# Patient Record
Sex: FEMALE | Race: WHITE | NOT HISPANIC OR LATINO
[De-identification: names, ages, dates, MRNs, and addresses within clinical notes are randomized per-mention and may not be internally consistent; named-entity substitution may affect disease eponyms.]

---

## 2018-07-12 ENCOUNTER — APPOINTMENT (OUTPATIENT)
Dept: NEUROSURGERY | Facility: CLINIC | Age: 23
End: 2018-07-12
Payer: MEDICAID

## 2018-07-12 VITALS
HEART RATE: 66 BPM | DIASTOLIC BLOOD PRESSURE: 77 MMHG | OXYGEN SATURATION: 99 % | WEIGHT: 115 LBS | SYSTOLIC BLOOD PRESSURE: 113 MMHG | TEMPERATURE: 98.6 F | RESPIRATION RATE: 18 BRPM | BODY MASS INDEX: 18.05 KG/M2 | HEIGHT: 67 IN

## 2018-07-12 DIAGNOSIS — Z78.9 OTHER SPECIFIED HEALTH STATUS: ICD-10-CM

## 2018-07-12 DIAGNOSIS — Z86.2 PERSONAL HISTORY OF DISEASES OF THE BLOOD AND BLOOD-FORMING ORGANS AND CERTAIN DISORDERS INVOLVING THE IMMUNE MECHANISM: ICD-10-CM

## 2018-07-12 DIAGNOSIS — L70.9 ACNE, UNSPECIFIED: ICD-10-CM

## 2018-07-12 PROCEDURE — 99204 OFFICE O/P NEW MOD 45 MIN: CPT

## 2018-08-08 ENCOUNTER — APPOINTMENT (OUTPATIENT)
Dept: NEUROSURGERY | Facility: HOSPITAL | Age: 23
End: 2018-08-08

## 2018-10-22 ENCOUNTER — APPOINTMENT (OUTPATIENT)
Dept: NEUROSURGERY | Facility: CLINIC | Age: 23
End: 2018-10-22
Payer: MEDICAID

## 2018-10-22 PROCEDURE — 99213 OFFICE O/P EST LOW 20 MIN: CPT

## 2018-10-26 VITALS
TEMPERATURE: 98 F | RESPIRATION RATE: 18 BRPM | OXYGEN SATURATION: 100 % | WEIGHT: 110.89 LBS | HEART RATE: 85 BPM | DIASTOLIC BLOOD PRESSURE: 75 MMHG | HEIGHT: 67 IN | SYSTOLIC BLOOD PRESSURE: 119 MMHG

## 2018-10-26 NOTE — PRE-OP CHECKLIST - SELECT TESTS ORDERED
CBC/CMP/Urinalysis/Results in MD note/INR/EKG/PT/PTT/CXR HCG/CXR/Urine HCG Negative/CMP/Results in MD note/Urinalysis/CBC/INR/PT/PTT/EKG

## 2018-10-28 NOTE — H&P ADULT - NSHPREVIEWOFSYSTEMS_GEN_ALL_CORE
No rashes  No headaches  No change in vision  No change in hearing  No nose bleeds  No hives  No bleeding gums  Neck is not swollen  No shortness of breath  No change in appetite  No difficulty with urination  No pain in extremities  No headache  No weakness  Affect appropriate

## 2018-10-28 NOTE — H&P ADULT - HISTORY OF PRESENT ILLNESS
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 This was initially discovered in 2014 and remained asymptomatic. The lesion grew in size in 2016 and continued to grow since last MRI 4/18. She continues to deny headaches, vision changes or gait changes. She is planned for left craniotomy for resection of this suspected low grade lesion on 10/29/18 (Monday)

## 2018-10-28 NOTE — H&P ADULT - ASSESSMENT
23 year old female with left parietal lesion    -Planned left craniotomy for resection of brain tumor  -Q1 neurochecks postop in ICU  -Decadron   -keppra  -Pathology will be sent in OR for analysis

## 2018-10-29 ENCOUNTER — RESULT REVIEW (OUTPATIENT)
Age: 23
End: 2018-10-29

## 2018-10-29 ENCOUNTER — APPOINTMENT (OUTPATIENT)
Dept: NEUROSURGERY | Facility: HOSPITAL | Age: 23
End: 2018-10-29

## 2018-10-29 ENCOUNTER — INPATIENT (INPATIENT)
Facility: HOSPITAL | Age: 23
LOS: 2 days | Discharge: HOME | DRG: 25 | End: 2018-11-01
Attending: NEUROLOGICAL SURGERY | Admitting: NEUROLOGICAL SURGERY
Payer: MEDICAID

## 2018-10-29 LAB
ANION GAP SERPL CALC-SCNC: 16 MMOL/L — SIGNIFICANT CHANGE UP (ref 5–17)
BASE EXCESS BLDA CALC-SCNC: -4.1 MMOL/L — LOW (ref -2–3)
BASE EXCESS BLDA CALC-SCNC: -4.6 MMOL/L — LOW (ref -2–3)
BUN SERPL-MCNC: 11 MG/DL — SIGNIFICANT CHANGE UP (ref 7–23)
CA-I BLDA-SCNC: 1.13 MMOL/L — SIGNIFICANT CHANGE UP (ref 1.12–1.3)
CA-I BLDA-SCNC: 1.14 MMOL/L — SIGNIFICANT CHANGE UP (ref 1.12–1.3)
CALCIUM SERPL-MCNC: 9.3 MG/DL — SIGNIFICANT CHANGE UP (ref 8.4–10.5)
CHLORIDE SERPL-SCNC: 102 MMOL/L — SIGNIFICANT CHANGE UP (ref 96–108)
CO2 SERPL-SCNC: 19 MMOL/L — LOW (ref 22–31)
COHGB MFR BLDA: 0.2 % — SIGNIFICANT CHANGE UP
COHGB MFR BLDA: 0.3 % — SIGNIFICANT CHANGE UP
CREAT SERPL-MCNC: 0.62 MG/DL — SIGNIFICANT CHANGE UP (ref 0.5–1.3)
GAS PNL BLDA: SIGNIFICANT CHANGE UP
GAS PNL BLDA: SIGNIFICANT CHANGE UP
GLUCOSE BLDC GLUCOMTR-MCNC: 118 MG/DL — HIGH (ref 70–99)
GLUCOSE BLDC GLUCOMTR-MCNC: 91 MG/DL — SIGNIFICANT CHANGE UP (ref 70–99)
GLUCOSE BLDC GLUCOMTR-MCNC: 96 MG/DL — SIGNIFICANT CHANGE UP (ref 70–99)
GLUCOSE SERPL-MCNC: 132 MG/DL — HIGH (ref 70–99)
HCO3 BLDA-SCNC: 19 MMOL/L — LOW (ref 21–28)
HCO3 BLDA-SCNC: 20 MMOL/L — LOW (ref 21–28)
HCT VFR BLD CALC: 29.1 % — LOW (ref 34.5–45)
HGB BLD-MCNC: 9.6 G/DL — LOW (ref 11.5–15.5)
HGB BLDA-MCNC: 10.3 G/DL — LOW (ref 11.5–15.5)
HGB BLDA-MCNC: 10.8 G/DL — LOW (ref 11.5–15.5)
MAGNESIUM SERPL-MCNC: 1.8 MG/DL — SIGNIFICANT CHANGE UP (ref 1.6–2.6)
MCHC RBC-ENTMCNC: 29.4 PG — SIGNIFICANT CHANGE UP (ref 27–34)
MCHC RBC-ENTMCNC: 33 G/DL — SIGNIFICANT CHANGE UP (ref 32–36)
MCV RBC AUTO: 89.3 FL — SIGNIFICANT CHANGE UP (ref 80–100)
METHGB MFR BLDA: 0.6 % — SIGNIFICANT CHANGE UP
METHGB MFR BLDA: 0.8 % — SIGNIFICANT CHANGE UP
O2 CT VFR BLDA CALC: 15 ML/DL — SIGNIFICANT CHANGE UP (ref 15–23)
O2 CT VFR BLDA CALC: 15.7 ML/DL — SIGNIFICANT CHANGE UP (ref 15–23)
OXYHGB MFR BLDA: 98 % — SIGNIFICANT CHANGE UP (ref 94–100)
OXYHGB MFR BLDA: 99 % — SIGNIFICANT CHANGE UP (ref 94–100)
PCO2 BLDA: 30 MMHG — LOW (ref 32–45)
PCO2 BLDA: 34 MMHG — SIGNIFICANT CHANGE UP (ref 32–45)
PH BLDA: 7.39 — SIGNIFICANT CHANGE UP (ref 7.35–7.45)
PH BLDA: 7.42 — SIGNIFICANT CHANGE UP (ref 7.35–7.45)
PHOSPHATE SERPL-MCNC: 3.9 MG/DL — SIGNIFICANT CHANGE UP (ref 2.5–4.5)
PLATELET # BLD AUTO: 226 K/UL — SIGNIFICANT CHANGE UP (ref 150–400)
PO2 BLDA: 287 MMHG — HIGH (ref 83–108)
PO2 BLDA: 307 MMHG — HIGH (ref 83–108)
POTASSIUM BLDA-SCNC: 3.4 MMOL/L — LOW (ref 3.5–4.9)
POTASSIUM BLDA-SCNC: 4.1 MMOL/L — SIGNIFICANT CHANGE UP (ref 3.5–4.9)
POTASSIUM SERPL-MCNC: 3.7 MMOL/L — SIGNIFICANT CHANGE UP (ref 3.5–5.3)
POTASSIUM SERPL-SCNC: 3.7 MMOL/L — SIGNIFICANT CHANGE UP (ref 3.5–5.3)
RBC # BLD: 3.26 M/UL — LOW (ref 3.8–5.2)
RBC # FLD: 14.7 % — SIGNIFICANT CHANGE UP (ref 10.3–16.9)
SAO2 % BLDA: 99 % — SIGNIFICANT CHANGE UP (ref 95–100)
SAO2 % BLDA: 99 % — SIGNIFICANT CHANGE UP (ref 95–100)
SODIUM BLDA-SCNC: 133 MMOL/L — LOW (ref 138–146)
SODIUM BLDA-SCNC: 135 MMOL/L — LOW (ref 138–146)
SODIUM SERPL-SCNC: 137 MMOL/L — SIGNIFICANT CHANGE UP (ref 135–145)
WBC # BLD: 7.7 K/UL — SIGNIFICANT CHANGE UP (ref 3.8–10.5)
WBC # FLD AUTO: 7.7 K/UL — SIGNIFICANT CHANGE UP (ref 3.8–10.5)

## 2018-10-29 PROCEDURE — 69990 MICROSURGERY ADD-ON: CPT | Mod: 80,59

## 2018-10-29 PROCEDURE — 61781 SCAN PROC CRANIAL INTRA: CPT

## 2018-10-29 PROCEDURE — 61510 CRNEC TREPH EXC BRN TUM STTL: CPT | Mod: 80

## 2018-10-29 PROCEDURE — 61781 SCAN PROC CRANIAL INTRA: CPT | Mod: 80

## 2018-10-29 PROCEDURE — 69990 MICROSURGERY ADD-ON: CPT | Mod: 59

## 2018-10-29 PROCEDURE — 61510 CRNEC TREPH EXC BRN TUM STTL: CPT

## 2018-10-29 PROCEDURE — 70551 MRI BRAIN STEM W/O DYE: CPT | Mod: 26

## 2018-10-29 RX ORDER — VANCOMYCIN HCL 1 G
750 VIAL (EA) INTRAVENOUS EVERY 8 HOURS
Qty: 0 | Refills: 0 | Status: COMPLETED | OUTPATIENT
Start: 2018-10-29 | End: 2018-10-30

## 2018-10-29 RX ORDER — MAGNESIUM SULFATE 500 MG/ML
2 VIAL (ML) INJECTION ONCE
Qty: 0 | Refills: 0 | Status: COMPLETED | OUTPATIENT
Start: 2018-10-29 | End: 2018-10-29

## 2018-10-29 RX ORDER — TRAMADOL HYDROCHLORIDE 50 MG/1
25 TABLET ORAL EVERY 6 HOURS
Qty: 0 | Refills: 0 | Status: DISCONTINUED | OUTPATIENT
Start: 2018-10-29 | End: 2018-10-31

## 2018-10-29 RX ORDER — DEXTROSE 50 % IN WATER 50 %
25 SYRINGE (ML) INTRAVENOUS ONCE
Qty: 0 | Refills: 0 | Status: DISCONTINUED | OUTPATIENT
Start: 2018-10-29 | End: 2018-11-01

## 2018-10-29 RX ORDER — DEXAMETHASONE 0.5 MG/5ML
4 ELIXIR ORAL EVERY 6 HOURS
Qty: 0 | Refills: 0 | Status: DISCONTINUED | OUTPATIENT
Start: 2018-10-29 | End: 2018-10-30

## 2018-10-29 RX ORDER — INFLUENZA VIRUS VACCINE 15; 15; 15; 15 UG/.5ML; UG/.5ML; UG/.5ML; UG/.5ML
0.5 SUSPENSION INTRAMUSCULAR ONCE
Qty: 0 | Refills: 0 | Status: DISCONTINUED | OUTPATIENT
Start: 2018-10-29 | End: 2018-11-01

## 2018-10-29 RX ORDER — DEXTROSE 50 % IN WATER 50 %
15 SYRINGE (ML) INTRAVENOUS ONCE
Qty: 0 | Refills: 0 | Status: DISCONTINUED | OUTPATIENT
Start: 2018-10-29 | End: 2018-11-01

## 2018-10-29 RX ORDER — VANCOMYCIN HCL 1 G
750 VIAL (EA) INTRAVENOUS EVERY 8 HOURS
Qty: 0 | Refills: 0 | Status: DISCONTINUED | OUTPATIENT
Start: 2018-10-29 | End: 2018-10-29

## 2018-10-29 RX ORDER — ACETAMINOPHEN 500 MG
1000 TABLET ORAL ONCE
Qty: 0 | Refills: 0 | Status: COMPLETED | OUTPATIENT
Start: 2018-10-29 | End: 2018-10-29

## 2018-10-29 RX ORDER — PANTOPRAZOLE SODIUM 20 MG/1
40 TABLET, DELAYED RELEASE ORAL
Qty: 0 | Refills: 0 | Status: DISCONTINUED | OUTPATIENT
Start: 2018-10-29 | End: 2018-11-01

## 2018-10-29 RX ORDER — LEVETIRACETAM 250 MG/1
500 TABLET, FILM COATED ORAL EVERY 12 HOURS
Qty: 0 | Refills: 0 | Status: DISCONTINUED | OUTPATIENT
Start: 2018-10-29 | End: 2018-11-01

## 2018-10-29 RX ORDER — SODIUM CHLORIDE 9 MG/ML
1000 INJECTION, SOLUTION INTRAVENOUS
Qty: 0 | Refills: 0 | Status: DISCONTINUED | OUTPATIENT
Start: 2018-10-29 | End: 2018-11-01

## 2018-10-29 RX ORDER — INSULIN LISPRO 100/ML
VIAL (ML) SUBCUTANEOUS
Qty: 0 | Refills: 0 | Status: DISCONTINUED | OUTPATIENT
Start: 2018-10-29 | End: 2018-11-01

## 2018-10-29 RX ORDER — SODIUM CHLORIDE 9 MG/ML
1000 INJECTION INTRAMUSCULAR; INTRAVENOUS; SUBCUTANEOUS
Qty: 0 | Refills: 0 | Status: DISCONTINUED | OUTPATIENT
Start: 2018-10-29 | End: 2018-10-30

## 2018-10-29 RX ORDER — DEXTROSE 50 % IN WATER 50 %
12.5 SYRINGE (ML) INTRAVENOUS ONCE
Qty: 0 | Refills: 0 | Status: DISCONTINUED | OUTPATIENT
Start: 2018-10-29 | End: 2018-11-01

## 2018-10-29 RX ORDER — POTASSIUM CHLORIDE 20 MEQ
10 PACKET (EA) ORAL
Qty: 0 | Refills: 0 | Status: COMPLETED | OUTPATIENT
Start: 2018-10-29 | End: 2018-10-29

## 2018-10-29 RX ORDER — GLUCAGON INJECTION, SOLUTION 0.5 MG/.1ML
1 INJECTION, SOLUTION SUBCUTANEOUS ONCE
Qty: 0 | Refills: 0 | Status: DISCONTINUED | OUTPATIENT
Start: 2018-10-29 | End: 2018-11-01

## 2018-10-29 RX ORDER — ACETAMINOPHEN 500 MG
650 TABLET ORAL EVERY 6 HOURS
Qty: 0 | Refills: 0 | Status: DISCONTINUED | OUTPATIENT
Start: 2018-10-29 | End: 2018-11-01

## 2018-10-29 RX ADMIN — Medication 100 MILLIEQUIVALENT(S): at 21:28

## 2018-10-29 RX ADMIN — Medication 100 MILLIEQUIVALENT(S): at 22:46

## 2018-10-29 RX ADMIN — Medication 4 MILLIGRAM(S): at 21:27

## 2018-10-29 RX ADMIN — Medication 1000 MILLIGRAM(S): at 20:11

## 2018-10-29 RX ADMIN — Medication 250 MILLIGRAM(S): at 22:51

## 2018-10-29 RX ADMIN — Medication 50 GRAM(S): at 21:33

## 2018-10-29 RX ADMIN — Medication 400 MILLIGRAM(S): at 19:32

## 2018-10-29 RX ADMIN — SODIUM CHLORIDE 75 MILLILITER(S): 9 INJECTION INTRAMUSCULAR; INTRAVENOUS; SUBCUTANEOUS at 19:32

## 2018-10-29 NOTE — PROGRESS NOTE ADULT - ASSESSMENT
23y/F with L parietal mass, brain compression, cerebral edema, s/p L stereotactic crani, resection of brain tumor (10/29/2018, Dr. Gardiner, Dr. Bowden) Frozen: low grade glioma)    PLAN:   NEURO: neurochecks q1h, PRN pain meds with Tylenol, tramadol  s/p resection of brain mass:  steroid taper as per neurosurgery; f/u final histopathology, MRI on stepdown  seizure prophylaxis: levetiracetam 500mg PO BID  REHAB:  physical therapy evaluation and management    EARLY MOB:  HOB up    PULM:  PRN O2 support to keep sats >/=92%, incentive spirometry  CARDIO:  SBP goal 100-150mm Hg  ENDO:  Blood sugar goals 140-180 mg/dL, continue insulin sliding scale  GI:  PPI for GI prophylaxis while on steroids  DIET: regular diet  RENAL:  IVFL once eating well  HEM/ONC: normocytic anemia, will trend  VTE Prophylaxis: SCDs only, no DVT chemoprophylaxis for now as patient is high risk for bleed (fresh post-op), baseline LE Doppler for DVT suspected on admission (brain mass)  ID: afebrile, no leukocytosis, periop vancomycin then d/c  Social: will update family    ATTENDING ATTESTATION:  I was physically present for the key portions of the evaluation and management (E/M) service provided.  I agree with the above history, physical and plan, which I have reviewed and edited where appropriate.    Patient at high risk for neurological deterioration or death due to:  ICU delirium, aspiration PNA, DVT / PE, seizures.  Critical care time, excluding procedures: 60 minutes spent on total encounter, more than 50% of the visit was spent counseling and/or coordinating care by the attending physician.     Plan discussed with RN, house staff.

## 2018-10-29 NOTE — PROGRESS NOTE ADULT - SUBJECTIVE AND OBJECTIVE BOX
=================================  NEUROCRITICAL CARE ATTENDING NOTE  =================================    WARD REYES   MRN-[id number]  Summary:  23y/F with no PMH, 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  Admitted 10/28 for elective resection of mass.  Overnight Events: No significant events overnight.    Past Medical History: Brain lesion  No pertinent past medical history  Allergies:  adhesives (Rash) penicillin (Rash) sulfa drugs (Rash)   Home meds:  Accutane 10 mg oral capsule: 1 cap(s) orally 2 times a day    PHYSICAL EXAMINATION  HR: 84 (10-29 @ 19:53) (70 - 100) BP: 112/67 (10-29 @ 19:53) (111/58 - 119/62) RR: 18 (10-29 @ 19:53) (15 - 18) SpO2: 100% (10-29 @ 19:53) (100% - 100%)  NEUROLOGIC EXAMINATION:  Patient is awake, alert, fully oriented, pupils 3mm equal and reactive to light, EOMs intact, no facial droop, moving all 4s  GENERAL:  not intubated, not in cardiorespiratory distress  EENT: anicteric  CARDIOVASC:  (+) S1 S2, normal rate and regular rhythm  PULMONARY:  clear to auscultation bilaterally  ABDOMEN:  soft, nontender, with normoactive bowel sounds  EXTREMITIES:  no edema  SKIN:  no rash     LABS:  CAPILLARY BLOOD GLUCOSE 96 91               9.6    7.7   )-----------( 226      ( 29 Oct 2018 19:49 )             29.1     137  |  102  |  11  ----------------------------<  132<H>  3.7   |  19<L>  |  0.62    Ca    9.3      29 Oct 2018 19:49  Phos  3.9     10-29  Mg     1.8     10-29    10-29 @ 07:01  -  10-29 @ 20:23  IN: 75 mL / OUT: 75 mL / NET: 0 mL    Bacteriology:  CSF studies:  EEG:  Neuroimaging:  10/29 MRI: L parietal mass, likely low grade neoplasm  Other imaging:    MEDICATIONS: dexamethasone 4mg PO q6h mod ISS levetiracetam 500 q12h PO pantoprazole 40 IV daily vancomycin 750 IV q8h  tramadol PRN    IV FLUIDS: NS@75cc/hr  DRIPS:  DIET: regular  Lines: Nery  Drains:   Morris  Wounds:    CODE STATUS:  Full Code                       GOALS OF CARE:  aggressive                      DISPOSITION:  ICU

## 2018-10-29 NOTE — PROGRESS NOTE ADULT - ASSESSMENT
POD 0 s/p left stereotactic craniotomy and resection of tumor neuro intact  q1h neuro checks  pain control  cont decadron  cont keppra ppx  enc IS use  ADAT  bowel regimen  PPI  guerrero to gravity, dc in am  replete hypokalemia and hypomagnesemia  NS hydration for now  SCDs  post op david  d/w Dr. Gardiner and ICU house staff

## 2018-10-29 NOTE — PROGRESS NOTE ADULT - SUBJECTIVE AND OBJECTIVE BOX
Post op Note    Dx: left brain tumor    23y    Female   s/p left craniotomy and gross total resection of brain tumor today. Patient tolerated the procedure without complication and was extubated post procedure and transferred to ICU for observation. Patient currently denies any pain, headaches, nausea of vomiting.           T(C): --  HR: 84 (10-29-18 @ 19:53) (70 - 100)  BP: 112/67 (10-29-18 @ 19:53) (111/58 - 119/62)  RR: 18 (10-29-18 @ 19:53) (15 - 18)  SpO2: 100% (10-29-18 @ 19:53) (100% - 100%)  Wt(kg): --      Physical exam  Awake, alert, oriented x 3, PERRL, EOMI  Follows commands, speech clear  WENRER X4 with good strength   Incision: C/D/I

## 2018-10-30 LAB
ANION GAP SERPL CALC-SCNC: 15 MMOL/L — SIGNIFICANT CHANGE UP (ref 5–17)
BUN SERPL-MCNC: 8 MG/DL — SIGNIFICANT CHANGE UP (ref 7–23)
CALCIUM SERPL-MCNC: 9.4 MG/DL — SIGNIFICANT CHANGE UP (ref 8.4–10.5)
CHLORIDE SERPL-SCNC: 102 MMOL/L — SIGNIFICANT CHANGE UP (ref 96–108)
CO2 SERPL-SCNC: 21 MMOL/L — LOW (ref 22–31)
CREAT SERPL-MCNC: 0.53 MG/DL — SIGNIFICANT CHANGE UP (ref 0.5–1.3)
GLUCOSE BLDC GLUCOMTR-MCNC: 114 MG/DL — HIGH (ref 70–99)
GLUCOSE BLDC GLUCOMTR-MCNC: 124 MG/DL — HIGH (ref 70–99)
GLUCOSE BLDC GLUCOMTR-MCNC: 124 MG/DL — HIGH (ref 70–99)
GLUCOSE BLDC GLUCOMTR-MCNC: 130 MG/DL — HIGH (ref 70–99)
GLUCOSE SERPL-MCNC: 139 MG/DL — HIGH (ref 70–99)
HBA1C BLD-MCNC: 4.8 % — SIGNIFICANT CHANGE UP (ref 4–5.6)
HCT VFR BLD CALC: 27.8 % — LOW (ref 34.5–45)
HGB BLD-MCNC: 9.2 G/DL — LOW (ref 11.5–15.5)
MAGNESIUM SERPL-MCNC: 2.4 MG/DL — SIGNIFICANT CHANGE UP (ref 1.6–2.6)
MCHC RBC-ENTMCNC: 29.6 PG — SIGNIFICANT CHANGE UP (ref 27–34)
MCHC RBC-ENTMCNC: 33.1 G/DL — SIGNIFICANT CHANGE UP (ref 32–36)
MCV RBC AUTO: 89.4 FL — SIGNIFICANT CHANGE UP (ref 80–100)
PHOSPHATE SERPL-MCNC: 4.7 MG/DL — HIGH (ref 2.5–4.5)
PLATELET # BLD AUTO: 229 K/UL — SIGNIFICANT CHANGE UP (ref 150–400)
POTASSIUM SERPL-MCNC: 4.1 MMOL/L — SIGNIFICANT CHANGE UP (ref 3.5–5.3)
POTASSIUM SERPL-SCNC: 4.1 MMOL/L — SIGNIFICANT CHANGE UP (ref 3.5–5.3)
RBC # BLD: 3.11 M/UL — LOW (ref 3.8–5.2)
RBC # FLD: 14.7 % — SIGNIFICANT CHANGE UP (ref 10.3–16.9)
SODIUM SERPL-SCNC: 138 MMOL/L — SIGNIFICANT CHANGE UP (ref 135–145)
WBC # BLD: 13.3 K/UL — HIGH (ref 3.8–10.5)
WBC # FLD AUTO: 13.3 K/UL — HIGH (ref 3.8–10.5)

## 2018-10-30 PROCEDURE — 99291 CRITICAL CARE FIRST HOUR: CPT | Mod: 24

## 2018-10-30 PROCEDURE — 93970 EXTREMITY STUDY: CPT | Mod: 26

## 2018-10-30 RX ORDER — SODIUM CHLORIDE 9 MG/ML
1000 INJECTION INTRAMUSCULAR; INTRAVENOUS; SUBCUTANEOUS
Qty: 0 | Refills: 0 | Status: DISCONTINUED | OUTPATIENT
Start: 2018-10-30 | End: 2018-10-31

## 2018-10-30 RX ORDER — ONDANSETRON 8 MG/1
4 TABLET, FILM COATED ORAL EVERY 6 HOURS
Qty: 0 | Refills: 0 | Status: DISCONTINUED | OUTPATIENT
Start: 2018-10-30 | End: 2018-11-01

## 2018-10-30 RX ORDER — DEXAMETHASONE 0.5 MG/5ML
1 ELIXIR ORAL EVERY 24 HOURS
Qty: 0 | Refills: 0 | Status: CANCELLED | OUTPATIENT
Start: 2018-11-06 | End: 2018-11-01

## 2018-10-30 RX ORDER — DEXAMETHASONE 0.5 MG/5ML
4 ELIXIR ORAL EVERY 12 HOURS
Qty: 0 | Refills: 0 | Status: COMPLETED | OUTPATIENT
Start: 2018-10-30 | End: 2018-11-01

## 2018-10-30 RX ORDER — ACETAMINOPHEN 500 MG
1000 TABLET ORAL ONCE
Qty: 0 | Refills: 0 | Status: COMPLETED | OUTPATIENT
Start: 2018-10-30 | End: 2018-10-30

## 2018-10-30 RX ORDER — METOCLOPRAMIDE HCL 10 MG
10 TABLET ORAL EVERY 8 HOURS
Qty: 0 | Refills: 0 | Status: DISCONTINUED | OUTPATIENT
Start: 2018-10-30 | End: 2018-11-01

## 2018-10-30 RX ORDER — DEXAMETHASONE 0.5 MG/5ML
2 ELIXIR ORAL EVERY 12 HOURS
Qty: 0 | Refills: 0 | Status: DISCONTINUED | OUTPATIENT
Start: 2018-11-01 | End: 2018-11-01

## 2018-10-30 RX ORDER — DEXAMETHASONE 0.5 MG/5ML
2 ELIXIR ORAL EVERY 24 HOURS
Qty: 0 | Refills: 0 | Status: CANCELLED | OUTPATIENT
Start: 2018-11-04 | End: 2018-11-01

## 2018-10-30 RX ORDER — DEXAMETHASONE 0.5 MG/5ML
ELIXIR ORAL
Qty: 0 | Refills: 0 | Status: DISCONTINUED | OUTPATIENT
Start: 2018-10-30 | End: 2018-11-01

## 2018-10-30 RX ADMIN — TRAMADOL HYDROCHLORIDE 25 MILLIGRAM(S): 50 TABLET ORAL at 09:44

## 2018-10-30 RX ADMIN — Medication 400 MILLIGRAM(S): at 18:14

## 2018-10-30 RX ADMIN — TRAMADOL HYDROCHLORIDE 25 MILLIGRAM(S): 50 TABLET ORAL at 06:22

## 2018-10-30 RX ADMIN — TRAMADOL HYDROCHLORIDE 25 MILLIGRAM(S): 50 TABLET ORAL at 15:32

## 2018-10-30 RX ADMIN — TRAMADOL HYDROCHLORIDE 25 MILLIGRAM(S): 50 TABLET ORAL at 15:18

## 2018-10-30 RX ADMIN — Medication 4 MILLIGRAM(S): at 09:59

## 2018-10-30 RX ADMIN — Medication 4 MILLIGRAM(S): at 02:46

## 2018-10-30 RX ADMIN — Medication 650 MILLIGRAM(S): at 23:18

## 2018-10-30 RX ADMIN — TRAMADOL HYDROCHLORIDE 25 MILLIGRAM(S): 50 TABLET ORAL at 08:40

## 2018-10-30 RX ADMIN — TRAMADOL HYDROCHLORIDE 25 MILLIGRAM(S): 50 TABLET ORAL at 03:10

## 2018-10-30 RX ADMIN — ONDANSETRON 4 MILLIGRAM(S): 8 TABLET, FILM COATED ORAL at 13:49

## 2018-10-30 RX ADMIN — Medication 650 MILLIGRAM(S): at 01:55

## 2018-10-30 RX ADMIN — Medication 1000 MILLIGRAM(S): at 19:24

## 2018-10-30 RX ADMIN — Medication 250 MILLIGRAM(S): at 06:24

## 2018-10-30 RX ADMIN — LEVETIRACETAM 500 MILLIGRAM(S): 250 TABLET, FILM COATED ORAL at 13:49

## 2018-10-30 RX ADMIN — Medication 4 MILLIGRAM(S): at 18:14

## 2018-10-30 RX ADMIN — Medication 1000 MILLIGRAM(S): at 10:00

## 2018-10-30 RX ADMIN — PANTOPRAZOLE SODIUM 40 MILLIGRAM(S): 20 TABLET, DELAYED RELEASE ORAL at 06:24

## 2018-10-30 RX ADMIN — Medication 400 MILLIGRAM(S): at 09:59

## 2018-10-30 RX ADMIN — LEVETIRACETAM 500 MILLIGRAM(S): 250 TABLET, FILM COATED ORAL at 02:46

## 2018-10-30 RX ADMIN — Medication 650 MILLIGRAM(S): at 02:43

## 2018-10-30 NOTE — PHYSICAL THERAPY INITIAL EVALUATION ADULT - PHYSICAL ASSIST/NONPHYSICAL ASSIST: SIT/STAND, REHAB EVAL
verbal cues/1 person assist/slightly unsteady however no LOB noted; increased time required to complete task

## 2018-10-30 NOTE — PROGRESS NOTE ADULT - SUBJECTIVE AND OBJECTIVE BOX
S/Overnight events:        Hospital Course:   POD#       Vital Signs Last 24 Hrs  T(C): 37.3 (30 Oct 2018 06:02), Max: 37.3 (30 Oct 2018 06:02)  T(F): 99.1 (30 Oct 2018 06:02), Max: 99.1 (30 Oct 2018 06:02)  HR: 70 (30 Oct 2018 08:00) (56 - 100)  BP: 99/54 (30 Oct 2018 08:00) (95/47 - 128/696)  BP(mean): 77 (30 Oct 2018 08:00) (59 - 88)  RR: 19 (30 Oct 2018 08:00) (12 - 21)  SpO2: 97% (30 Oct 2018 08:00) (97% - 100%)    I&O's Detail    29 Oct 2018 07:01  -  30 Oct 2018 07:00  --------------------------------------------------------  IN:    IV PiggyBack: 750 mL    Oral Fluid: 360 mL    sodium chloride 0.9%.: 900 mL  Total IN: 2010 mL    OUT:    Indwelling Catheter - Urethral: 1820 mL  Total OUT: 1820 mL    Total NET: 190 mL        I&O's Summary    29 Oct 2018 07:01  -  30 Oct 2018 07:00  --------------------------------------------------------  IN: 2010 mL / OUT: 1820 mL / NET: 190 mL        PHYSICAL EXAM:  General:  HEENT:  Cardiovascular:  Respiratory:  Gastrointestinal:  Genitourinary:  Extremities:    DEVICE/DRAIN DRESSING:    TUBES/LINES:  [] CVC  [] A-line  [] Lumbar Drain  [] Ventriculostomy  [] Other    DIET:  [] NPO  [] Mechanical  [] Tube feeds    LABS:                        9.2    13.3  )-----------( 229      ( 30 Oct 2018 05:24 )             27.8     10-30    138  |  102  |  8   ----------------------------<  139<H>  4.1   |  21<L>  |  0.53    Ca    9.4      30 Oct 2018 05:24  Phos  4.7     10-30  Mg     2.4     10-30              CAPILLARY BLOOD GLUCOSE      POCT Blood Glucose.: 130 mg/dL (30 Oct 2018 05:40)  POCT Blood Glucose.: 118 mg/dL (29 Oct 2018 21:14)  POCT Blood Glucose.: 96 mg/dL (29 Oct 2018 16:37)  POCT Blood Glucose.: 91 mg/dL (29 Oct 2018 14:00)      Drug Levels: [] N/A    CSF Analysis: [] N/A      Allergies    adhesives (Rash)  penicillin (Rash)  sulfa drugs (Rash)    Intolerances      MEDICATIONS:  Antibiotics:    Neuro:  acetaminophen   Tablet .. 650 milliGRAM(s) Oral every 6 hours PRN  levETIRAcetam 500 milliGRAM(s) Oral every 12 hours  traMADol 25 milliGRAM(s) Oral every 6 hours PRN    Anticoagulation:    OTHER:  dexamethasone     Tablet 4 milliGRAM(s) Oral every 6 hours  dextrose 40% Gel 15 Gram(s) Oral once PRN  dextrose 50% Injectable 12.5 Gram(s) IV Push once  dextrose 50% Injectable 25 Gram(s) IV Push once  dextrose 50% Injectable 25 Gram(s) IV Push once  glucagon  Injectable 1 milliGRAM(s) IntraMuscular once PRN  influenza   Vaccine 0.5 milliLiter(s) IntraMuscular once  insulin lispro (HumaLOG) corrective regimen sliding scale   SubCutaneous Before meals and at bedtime  pantoprazole    Tablet 40 milliGRAM(s) Oral before breakfast    IVF:  dextrose 5%. 1000 milliLiter(s) IV Continuous <Continuous>  sodium chloride 0.9%. 1000 milliLiter(s) IV Continuous <Continuous>    CULTURES:    RADIOLOGY & ADDITIONAL TESTS:      ASSESSMENT:  23y Female s/p    BRAIN LESION  Handoff  Brain lesion  No pertinent past medical history  Brain tumor  No significant past surgical history      PLAN:  NEURO:    CARDIOVASCULAR:    PULMONARY:    RENAL:    GI:    HEME:    ID:    ENDO:    DVT PROPHYLAXIS:  [] Venodynes                                [] Heparin/Lovenox    FALL RISK:  [] Low Risk                                    [] Impulsive    DISPOSITION: S/Overnight events:    No acute events overnight. Patient denies N/V/dizziness/ pain    Hospital Course:   10/30: POD#0 left craniotomy and gross total resection of brain tumor  10/31: POD#1:     Vital Signs Last 24 Hrs  T(C): 37.3 (30 Oct 2018 06:02), Max: 37.3 (30 Oct 2018 06:02)  T(F): 99.1 (30 Oct 2018 06:02), Max: 99.1 (30 Oct 2018 06:02)  HR: 70 (30 Oct 2018 08:00) (56 - 100)  BP: 99/54 (30 Oct 2018 08:00) (95/47 - 128/696)  BP(mean): 77 (30 Oct 2018 08:00) (59 - 88)  RR: 19 (30 Oct 2018 08:00) (12 - 21)  SpO2: 97% (30 Oct 2018 08:00) (97% - 100%)    I&O's Detail    29 Oct 2018 07:01  -  30 Oct 2018 07:00  --------------------------------------------------------  IN:    IV PiggyBack: 750 mL    Oral Fluid: 360 mL    sodium chloride 0.9%.: 900 mL  Total IN: 2010 mL    OUT:    Indwelling Catheter - Urethral: 1820 mL  Total OUT: 1820 mL    Total NET: 190 mL        I&O's Summary    29 Oct 2018 07:01  -  30 Oct 2018 07:00  --------------------------------------------------------  IN: 2010 mL / OUT: 1820 mL / NET: 190 mL        PHYSICAL EXAM:  General:  HEENT:  Cardiovascular:  Respiratory:  Gastrointestinal:  Genitourinary:  Extremities:    DEVICE/DRAIN DRESSING:    TUBES/LINES:  [] CVC  [] A-line  [] Lumbar Drain  [] Ventriculostomy  [] Other    DIET:  [] NPO  [] Mechanical  [] Tube feeds    LABS:                        9.2    13.3  )-----------( 229      ( 30 Oct 2018 05:24 )             27.8     10-30    138  |  102  |  8   ----------------------------<  139<H>  4.1   |  21<L>  |  0.53    Ca    9.4      30 Oct 2018 05:24  Phos  4.7     10-30  Mg     2.4     10-30              CAPILLARY BLOOD GLUCOSE      POCT Blood Glucose.: 130 mg/dL (30 Oct 2018 05:40)  POCT Blood Glucose.: 118 mg/dL (29 Oct 2018 21:14)  POCT Blood Glucose.: 96 mg/dL (29 Oct 2018 16:37)  POCT Blood Glucose.: 91 mg/dL (29 Oct 2018 14:00)      Drug Levels: [] N/A    CSF Analysis: [] N/A      Allergies    adhesives (Rash)  penicillin (Rash)  sulfa drugs (Rash)    Intolerances      MEDICATIONS:  Antibiotics:    Neuro:  acetaminophen   Tablet .. 650 milliGRAM(s) Oral every 6 hours PRN  levETIRAcetam 500 milliGRAM(s) Oral every 12 hours  traMADol 25 milliGRAM(s) Oral every 6 hours PRN    Anticoagulation:    OTHER:  dexamethasone     Tablet 4 milliGRAM(s) Oral every 6 hours  dextrose 40% Gel 15 Gram(s) Oral once PRN  dextrose 50% Injectable 12.5 Gram(s) IV Push once  dextrose 50% Injectable 25 Gram(s) IV Push once  dextrose 50% Injectable 25 Gram(s) IV Push once  glucagon  Injectable 1 milliGRAM(s) IntraMuscular once PRN  influenza   Vaccine 0.5 milliLiter(s) IntraMuscular once  insulin lispro (HumaLOG) corrective regimen sliding scale   SubCutaneous Before meals and at bedtime  pantoprazole    Tablet 40 milliGRAM(s) Oral before breakfast    IVF:  dextrose 5%. 1000 milliLiter(s) IV Continuous <Continuous>  sodium chloride 0.9%. 1000 milliLiter(s) IV Continuous <Continuous>    CULTURES:    RADIOLOGY & ADDITIONAL TESTS:      ASSESSMENT:  23y Female s/p    BRAIN LESION  Handoff  Brain lesion  No pertinent past medical history  Brain tumor  No significant past surgical history      PLAN:  NEURO:    CARDIOVASCULAR:    PULMONARY:    RENAL:    GI:    HEME:    ID:    ENDO:    DVT PROPHYLAXIS:  [] Venodynes                                [] Heparin/Lovenox    FALL RISK:  [] Low Risk                                    [] Impulsive    DISPOSITION: S/Overnight events:    No acute events overnight. Patient denies N/V/dizziness/ pain. Frozen: low grade glioma     Hospital Course:   10/30: POD#0 left craniotomy and gross total resection of brain tumor  10/31: POD#1: No acute events overnight.     Vital Signs Last 24 Hrs  T(C): 37.3 (30 Oct 2018 06:02), Max: 37.3 (30 Oct 2018 06:02)  T(F): 99.1 (30 Oct 2018 06:02), Max: 99.1 (30 Oct 2018 06:02)  HR: 70 (30 Oct 2018 08:00) (56 - 100)  BP: 99/54 (30 Oct 2018 08:00) (95/47 - 128/696)  BP(mean): 77 (30 Oct 2018 08:00) (59 - 88)  RR: 19 (30 Oct 2018 08:00) (12 - 21)  SpO2: 97% (30 Oct 2018 08:00) (97% - 100%)    I&O's Detail    29 Oct 2018 07:01  -  30 Oct 2018 07:00  --------------------------------------------------------  IN:    IV PiggyBack: 750 mL    Oral Fluid: 360 mL    sodium chloride 0.9%.: 900 mL  Total IN: 2010 mL    OUT:    Indwelling Catheter - Urethral: 1820 mL  Total OUT: 1820 mL    Total NET: 190 mL        I&O's Summary    29 Oct 2018 07:01  -  30 Oct 2018 07:00  --------------------------------------------------------  IN: 2010 mL / OUT: 1820 mL / NET: 190 mL        PHYSICAL EXAM:  General: NAD, AAXOx3  HEENT: PERRL, EOMI, visual fields intact, no facial asymmetry, tongue midline, parietal incision C/D/I  Cardiovascular: +S1, +S1, RRR  Respiratory: CTA bilaterally, unlabored breathing on RA   Gastrointestinal: normoactive BS. non-tender, non-distended, soft   Genitourinary: guerrero in place   Extremities: warm, well perfused, no edema noted x4 extremities  Neurological: CNII-XII intact, sensation to light touch intact bilaterally, no drift, strength 5/5 x4 extremities, follows commands, speech clear      DEVICE/DRAIN DRESSING:    TUBES/LINES:  [] CVC  [X] A-line  [] Lumbar Drain  [] Ventriculostomy  [X] Other--Guerrero     DIET:  [] NPO  [X] Mechanical  [] Tube feeds    LABS:                        9.2    13.3  )-----------( 229      ( 30 Oct 2018 05:24 )             27.8     10-30    138  |  102  |  8   ----------------------------<  139<H>  4.1   |  21<L>  |  0.53    Ca    9.4      30 Oct 2018 05:24  Phos  4.7     10-30  Mg     2.4     10-30              CAPILLARY BLOOD GLUCOSE      POCT Blood Glucose.: 130 mg/dL (30 Oct 2018 05:40)  POCT Blood Glucose.: 118 mg/dL (29 Oct 2018 21:14)  POCT Blood Glucose.: 96 mg/dL (29 Oct 2018 16:37)  POCT Blood Glucose.: 91 mg/dL (29 Oct 2018 14:00)      Drug Levels: [] N/A    CSF Analysis: [] N/A      Allergies    adhesives (Rash)  penicillin (Rash)  sulfa drugs (Rash)    Intolerances      MEDICATIONS:  Antibiotics:    Neuro:  acetaminophen   Tablet .. 650 milliGRAM(s) Oral every 6 hours PRN  levETIRAcetam 500 milliGRAM(s) Oral every 12 hours  traMADol 25 milliGRAM(s) Oral every 6 hours PRN    Anticoagulation:    OTHER:  dexamethasone     Tablet 4 milliGRAM(s) Oral every 6 hours  dextrose 40% Gel 15 Gram(s) Oral once PRN  dextrose 50% Injectable 12.5 Gram(s) IV Push once  dextrose 50% Injectable 25 Gram(s) IV Push once  dextrose 50% Injectable 25 Gram(s) IV Push once  glucagon  Injectable 1 milliGRAM(s) IntraMuscular once PRN  influenza   Vaccine 0.5 milliLiter(s) IntraMuscular once  insulin lispro (HumaLOG) corrective regimen sliding scale   SubCutaneous Before meals and at bedtime  pantoprazole    Tablet 40 milliGRAM(s) Oral before breakfast    IVF:  dextrose 5%. 1000 milliLiter(s) IV Continuous <Continuous>  sodium chloride 0.9%. 1000 milliLiter(s) IV Continuous <Continuous>    CULTURES:    RADIOLOGY & ADDITIONAL TESTS:      ASSESSMENT:  23y Female with no significant PMH is POD#1 s/p left craniotomy for gross total resection of brain tumor. Frozen: Low grade glioma     BRAIN LESION  Handoff  Brain lesion  No pertinent past medical history  Brain tumor  No significant past surgical history      PLAN:  NEURO:  -q2 neuro checks  -Decadron 4q6  -Tylenol for mild pain PRN, Tramadol for moderate pain PRN  -Keppra 500mg q12 for seizure ppx  -HOB elevated 30 degrees  -MRI     CARDIOVASCULAR:  -normotensive   -remove A-line     PULMONARY:  -RA  -encourage IS    RENAL:  -IVF until adequate fluid intake   -remove Guerrero     GI:  -pantoprazole for PPX  -regular diet     HEME:  -daily CBC--trend post-op H&H     ID:  -afebrile, no leukocytosis     ENDO:  -ISS     DVT PROPHYLAXIS:  -SCDS     DISPOSITION:   -PT/OT eval S/Overnight events:    No acute events overnight. Patient denies N/V/dizziness/ pain. Frozen: low grade glioma     Hospital Course:   10/30: POD#0 left craniotomy and gross total resection of brain tumor  10/31: POD#1: No acute events overnight.     Vital Signs Last 24 Hrs  T(C): 37.3 (30 Oct 2018 06:02), Max: 37.3 (30 Oct 2018 06:02)  T(F): 99.1 (30 Oct 2018 06:02), Max: 99.1 (30 Oct 2018 06:02)  HR: 70 (30 Oct 2018 08:00) (56 - 100)  BP: 99/54 (30 Oct 2018 08:00) (95/47 - 128/696)  BP(mean): 77 (30 Oct 2018 08:00) (59 - 88)  RR: 19 (30 Oct 2018 08:00) (12 - 21)  SpO2: 97% (30 Oct 2018 08:00) (97% - 100%)    I&O's Detail    29 Oct 2018 07:01  -  30 Oct 2018 07:00  --------------------------------------------------------  IN:    IV PiggyBack: 750 mL    Oral Fluid: 360 mL    sodium chloride 0.9%.: 900 mL  Total IN: 2010 mL    OUT:    Indwelling Catheter - Urethral: 1820 mL  Total OUT: 1820 mL    Total NET: 190 mL        I&O's Summary    29 Oct 2018 07:01  -  30 Oct 2018 07:00  --------------------------------------------------------  IN: 2010 mL / OUT: 1820 mL / NET: 190 mL        PHYSICAL EXAM:  General: NAD, AAXOx3  Neuro:  PERRL, EOMI, visual fields intact, no facial asymmetry, tongue midline, parietal incision C/D/I  CNII-XII intact, sensation to light touch intact bilaterally, no drift, strength 5/5 x4 extremities, follows commands, speech clear    Cardiovascular: +S1, +S1, RRR  Respiratory: CTA bilaterally, unlabored breathing on RA   Gastrointestinal: normoactive BS. non-tender, non-distended, soft   Genitourinary: guerrero in place   Extremities: warm, well perfused, no edema noted x4 extremities      TUBES/LINES:  [] CVC  [X] A-line  [] Lumbar Drain  [] Ventriculostomy  [X] Other--Guerrero     DIET:  [] NPO  [X] Mechanical  [] Tube feeds    LABS:                        9.2    13.3  )-----------( 229      ( 30 Oct 2018 05:24 )             27.8     10-30    138  |  102  |  8   ----------------------------<  139<H>  4.1   |  21<L>  |  0.53    Ca    9.4      30 Oct 2018 05:24  Phos  4.7     10-30  Mg     2.4     10-30              CAPILLARY BLOOD GLUCOSE      POCT Blood Glucose.: 130 mg/dL (30 Oct 2018 05:40)  POCT Blood Glucose.: 118 mg/dL (29 Oct 2018 21:14)  POCT Blood Glucose.: 96 mg/dL (29 Oct 2018 16:37)  POCT Blood Glucose.: 91 mg/dL (29 Oct 2018 14:00)      Drug Levels: [] N/A    CSF Analysis: [] N/A      Allergies    adhesives (Rash)  penicillin (Rash)  sulfa drugs (Rash)    Intolerances      MEDICATIONS:  Antibiotics:    Neuro:  acetaminophen   Tablet .. 650 milliGRAM(s) Oral every 6 hours PRN  levETIRAcetam 500 milliGRAM(s) Oral every 12 hours  traMADol 25 milliGRAM(s) Oral every 6 hours PRN    Anticoagulation:    OTHER:  dexamethasone     Tablet 4 milliGRAM(s) Oral every 6 hours  dextrose 40% Gel 15 Gram(s) Oral once PRN  dextrose 50% Injectable 12.5 Gram(s) IV Push once  dextrose 50% Injectable 25 Gram(s) IV Push once  dextrose 50% Injectable 25 Gram(s) IV Push once  glucagon  Injectable 1 milliGRAM(s) IntraMuscular once PRN  influenza   Vaccine 0.5 milliLiter(s) IntraMuscular once  insulin lispro (HumaLOG) corrective regimen sliding scale   SubCutaneous Before meals and at bedtime  pantoprazole    Tablet 40 milliGRAM(s) Oral before breakfast    IVF:  dextrose 5%. 1000 milliLiter(s) IV Continuous <Continuous>  sodium chloride 0.9%. 1000 milliLiter(s) IV Continuous <Continuous>    CULTURES:    RADIOLOGY & ADDITIONAL TESTS:      ASSESSMENT:  23y Female with no significant PMH is POD#1 s/p left craniotomy for gross total resection of brain tumor. Frozen: Low grade glioma     BRAIN LESION  Handoff  Brain lesion  No pertinent past medical history  Brain tumor  No significant past surgical history      PLAN:  NEURO:  -q2 neuro checks  -Decadron 4 BID, 1 week taper   -Tylenol for mild pain PRN, Tramadol for moderate pain PRN  -Keppra 500mg q12 for seizure ppx  -HOB elevated 30 degrees  -MRI in 48 hours     CARDIOVASCULAR:  -normotensive   -remove A-line,     PULMONARY:  -RA  -encourage IS    RENAL:  -IVF until adequate fluid intake   -remove Guerrero     GI:  -pantoprazole for PPX while on decadron   -regular diet   - bowel regimen     HEME:  -daily CBC--trend post-op H&H     ID:  -afebrile, no leukocytosis     ENDO:  -ISS     DVT PROPHYLAXIS:  -SCDS, no chemo ppx for now   - pending LE doppler    -PT/OT eval   DISPOSITION:  Keep in ICU for now   Full code, family updated     d/w Dr. Gardiner and Dr. lim

## 2018-10-30 NOTE — PROGRESS NOTE ADULT - SUBJECTIVE AND OBJECTIVE BOX
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  Admitted 10/28 for elective resection of mass.  Overnight Events: No significant events overnight.    Past Medical History: Brain lesion  No pertinent past medical history  Allergies:  adhesives (Rash) penicillin (Rash) sulfa drugs (Rash)   Home meds:  Accutane 10 mg oral capsule: 1 cap(s) orally 2 times a day    PHYSICAL EXAMINATION  T(C): 37.3 (10-30 @ 06:02), Max: 37.3 (10-30 @ 06:02) HR: 70 (10-30 @ 08:00) (56 - 100) BP: 99/54 (10-30 @ 08:00) (95/47 - 128/696) RR: 19 (10-30 @ 08:00) (12 - 21) SpO2: 97% (10-30 @ 08:00) (97% - 100%)   NEUROLOGIC EXAMINATION:  Patient is awake, alert, fully oriented, pupils 3mm equal and reactive to light, EOMs intact, no facial droop, moving all 4s  GENERAL:  not intubated, not in cardiorespiratory distress  EENT: anicteric  CARDIOVASC:  (+) S1 S2, normal rate and regular rhythm  PULMONARY:  clear to auscultation bilaterally  ABDOMEN:  soft, nontender, with normoactive bowel sounds  EXTREMITIES:  no edema  SKIN:  no rash     LABS:  CAPILLARY BLOOD GLUCOSE 130 118 96 91    (7.7)      9.2  (9.6)  13.3  )-----------( 229      ( 30 Oct 2018 05:24 )             27.8     138  |  102  |  8   ----------------------------<  139<H>  4.1   |  21<L>  |  0.53    Ca    9.4      30 Oct 2018 05:24  Phos  4.7     10-30  Mg     2.4     10-30    10-29 @ 07:01  -  10-30 @ 07:00  IN: 2010 mL / OUT: 1820 mL / NET: 190 mL    Bacteriology:  CSF studies:  EEG:  Neuroimaging:  10/29 MRI: L parietal mass, likely low grade neoplasm  Other imaging:    MEDICATIONS: dexamethasone 4mg PO q6h mod ISS levetiracetam 500 q12h PO pantoprazole 40 IV daily vancomycin 750 IV q8h  tramadol PRN  MEDICATIONS: dexamethasone 4mg PO q6h mod ISS levetiracetam 500 q12h PO pantoprazole 40 PO daily tramadol 25mg q6h PRN    IV FLUIDS: NS@75cc/hr  DRIPS:  DIET: regular  Lines: Nery  Drains:   Morris  Wounds:    CODE STATUS:  Full Code                       GOALS OF CARE:  aggressive                      DISPOSITION:  ICU =================================  NEUROCRITICAL CARE ATTENDING NOTE  =================================    WARD REYES   MRN-[id number]  Summary:  23y/F with no PMH, 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  Admitted 10/28 for elective resection of mass.  Overnight Events: s/p resection, complaining of headache    Past Medical History: Brain lesion  No pertinent past medical history  Allergies:  adhesives (Rash) penicillin (Rash) sulfa drugs (Rash)   Home meds:  Accutane 10 mg oral capsule: 1 cap(s) orally 2 times a day    PHYSICAL EXAMINATION  T(C): 37.3 (10-30 @ 06:02), Max: 37.3 (10-30 @ 06:02) HR: 70 (10-30 @ 08:00) (56 - 100) BP: 99/54 (10-30 @ 08:00) (95/47 - 128/696) RR: 19 (10-30 @ 08:00) (12 - 21) SpO2: 97% (10-30 @ 08:00) (97% - 100%)   NEUROLOGIC EXAMINATION:  Patient is awake, alert, fully oriented, pupils 3mm equal and reactive to light, EOMs intact, no facial droop, moving all 4s  GENERAL:  not intubated, not in cardiorespiratory distress  EENT: anicteric  CARDIOVASC:  (+) S1 S2, normal rate and regular rhythm  PULMONARY:  clear to auscultation bilaterally  ABDOMEN:  soft, nontender, with normoactive bowel sounds  EXTREMITIES:  no edema  SKIN:  no rash     LABS:  CAPILLARY BLOOD GLUCOSE 130 118 96 91    (7.7)      9.2  (9.6)  13.3  )-----------( 229      ( 30 Oct 2018 05:24 )             27.8     138  |  102  |  8   ----------------------------<  139<H>  4.1   |  21<L>  |  0.53    Ca    9.4      30 Oct 2018 05:24  Phos  4.7     10-30  Mg     2.4     10-30    HbA1C = 4.8 (10-30)    10-29 @ 07:01  -  10-30 @ 07:00  IN: 2010 mL / OUT: 1820 mL / NET: 190 mL    Bacteriology:  CSF studies:  EEG:  Neuroimaging:  10/29 MRI: L parietal mass, likely low grade neoplasm  Other imaging:    MEDICATIONS: dexamethasone 4mg PO q6h mod ISS levetiracetam 500 q12h PO pantoprazole 40 PO daily tramadol 25mg q6h PRN    IV FLUIDS: NS@75cc/hr  DRIPS:  DIET: regular  Lines: Nery  Drains:   Morris  Wounds:    CODE STATUS:  Full Code                       GOALS OF CARE:  aggressive                      DISPOSITION:  ICU

## 2018-10-30 NOTE — PHYSICAL THERAPY INITIAL EVALUATION ADULT - PERTINENT HX OF CURRENT PROBLEM, REHAB EVAL
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 This was initially discovered in 2014 and remained asymptomatic. The lesion grew in size in 2016 and continued to grow since last MRI 4/18. She continues to deny headaches, vision changes or gait changes. Please refer to H&P on [place] for remaining.

## 2018-10-30 NOTE — PHYSICAL THERAPY INITIAL EVALUATION ADULT - GENERAL OBSERVATIONS, REHAB EVAL
Chart reviewed. IE Completed. Patient with complaints of 9/10 headache at rest however pre-medicated as per ARTIS Miller, pt remains agreeable to PT. Patient received semi-supine, NAD, +tele, +(L) cranial staples C/D/I, +(L)IV, +(L)radial a-line, +MD Koko guerrero cleared patient for treatment.

## 2018-10-30 NOTE — OCCUPATIONAL THERAPY INITIAL EVALUATION ADULT - MD ORDER
Per chart, 23-year-old right handed female with history of incidently found left parietal brain tumor found on MRI imaging. This was initially discovered in 2014 and remained asymptomatic. The lesion grew in size in 2016 and continued to grow since last MRI 4/18. She continues to deny headaches, vision changes or gait changes.

## 2018-10-30 NOTE — OCCUPATIONAL THERAPY INITIAL EVALUATION ADULT - NS ASR FOLLOW COMMAND OT EVAL
anxious, tearful at times/100% of the time anxious, tearful at times/100% of the time/unable to follow multi-step instructions

## 2018-10-30 NOTE — PHYSICAL THERAPY INITIAL EVALUATION ADULT - SENSORY TESTS
(R) hand dominant; (L) hand  5/5, (R) hand  5/5. CN Testing: B/L Frontalis intact; B/L buccinator intact; smile symmetrical; tongue protrusion at midline; B/L eyes open/close intact; Shoulder elevation: intact bilaterally; Vision H-Test: bilateral tracking and smooth pursuit intact; Convergence/Divergence: intact; Vision Quadrant Test: (L) eye intact for all quadrants; (R) temporal moderately impaired for superior/inferior quadrants; (R) nasal intact. Rapid alternating movements: N/T

## 2018-10-30 NOTE — PROGRESS NOTE ADULT - ASSESSMENT
23y/F with L parietal mass, brain compression, cerebral edema, s/p L stereotactic crani, resection of brain tumor (10/29/2018, Dr. Gardiner, Dr. Bowden) Frozen: low grade glioma)    PLAN:   NEURO: neurochecks q1h, PRN pain meds with Tylenol, tramadol  s/p resection of brain mass:  steroid taper as per neurosurgery; f/u final histopathology, MRI on stepdown  seizure prophylaxis: levetiracetam 500mg PO BID  REHAB:  physical therapy evaluation and management    EARLY MOB:  HOB up    PULM:  PRN O2 support to keep sats >/=92%, incentive spirometry  CARDIO:  SBP goal 100-150mm Hg  ENDO:  Blood sugar goals 140-180 mg/dL, continue insulin sliding scale  GI:  PPI for GI prophylaxis while on steroids  DIET: regular diet  RENAL:  IVFL once eating well  HEM/ONC: normocytic anemia, will trend  VTE Prophylaxis: SCDs only, no DVT chemoprophylaxis for now as patient is high risk for bleed (fresh post-op), baseline LE Doppler for DVT suspected on admission (brain mass)  ID: afebrile, no leukocytosis, periop vancomycin then d/c  Social: will update family    ATTENDING ATTESTATION:  I was physically present for the key portions of the evaluation and management (E/M) service provided.  I agree with the above history, physical and plan, which I have reviewed and edited where appropriate.    Patient at high risk for neurological deterioration or death due to:  ICU delirium, aspiration PNA, DVT / PE, seizures.  Critical care time, excluding procedures: 60 minutes spent on total encounter, more than 50% of the visit was spent counseling and/or coordinating care by the attending physician.     Plan discussed with RN, house staff. 23y/F with L parietal mass, brain compression, cerebral edema, s/p L stereotactic crani, resection of brain tumor (10/29/2018, Dr. Gardiner, Dr. Bowden) Frozen: (low grade glioma)    PLAN:   NEURO: neurochecks q2h, PRN pain meds with Tylenol, tramadol  s/p resection of brain mass:  steroid taper as per neurosurgery; f/u final histopathology, MRI on stepdown  seizure prophylaxis: levetiracetam 500mg PO BID  REHAB:  physical therapy evaluation and management    EARLY MOB:  HOB up OOB ambulate    PULM:  room air, incentive spirometry  CARDIO:  SBP goal 100-150mm Hg  ENDO:  Blood sugar goals 140-180 mg/dL, continue insulin sliding scale  GI:  PPI for GI prophylaxis while on steroids  DIET: regular diet  RENAL:  keep fluids until eating adequately   HEM/ONC: H b stable  VTE Prophylaxis: SCDs only, no DVT chemoprophylaxis for now as patient is high risk for bleed (fresh post-op late case) - will re-evaluate tomorrow, baseline LE Doppler for DVT suspected on admission (brain mass)  ID: afebrile, no leukocytosis  Social: family updated this morning    ATTENDING ATTESTATION:  I was physically present for the key portions of the evaluation and management (E/M) service provided.  I agree with the above history, physical and plan, which I have reviewed and edited where appropriate.    Patient at high risk for neurological deterioration or death due to:  ICU delirium, aspiration PNA, DVT / PE, seizures.  Critical care time, excluding procedures: 60 minutes spent on total encounter, more than 50% of the visit was spent counseling and/or coordinating care by the attending physician.     Plan discussed with RN, house staff.

## 2018-10-30 NOTE — OCCUPATIONAL THERAPY INITIAL EVALUATION ADULT - GENERAL OBSERVATIONS, REHAB EVAL
Right hand dominant. Chart reviewed, patient cleared for OT eval by ARTIS Miller. Received seated in recliner chair, NAD, +tele, +IV, reports headache with mild nausea (RN notified, patient premedicated)

## 2018-10-30 NOTE — OCCUPATIONAL THERAPY INITIAL EVALUATION ADULT - ADDITIONAL COMMENTS
Per patient she was fully independent in ADL and ambulation PTA, denies DME. States she will be staying in hotel +elevator access with family following discharge.

## 2018-10-30 NOTE — PHYSICAL THERAPY INITIAL EVALUATION ADULT - THERAPY FREQUENCY, PT EVAL
3-5x/week/Patient educated on frequency of inpatient therapy at [facility], patient verbalized understanding.

## 2018-10-30 NOTE — PHYSICAL THERAPY INITIAL EVALUATION ADULT - ADDITIONAL COMMENTS
Patient reports independence with all ADLs/IADLs prior to admission. Denies history of mechanical falls. (R)hand dominant. Denies use of eye-glasses for visual aides. **Patient and patient's mother report patient will be staying with family in a hotel with elevator access until November 7th.

## 2018-10-30 NOTE — PHYSICAL THERAPY INITIAL EVALUATION ADULT - GAIT DEVIATIONS NOTED, PT EVAL
decreased step length/decreased nereida/fairly steady gait, 1 LOB initial with reports of increased dizziness however dizziness subsided after about 30 seconds standing rest; appropriate negotiation of portable monitor through hallway obstacles without assist; increased time required with turning; no knee buckling noted

## 2018-10-30 NOTE — CONSULT NOTE ADULT - SUBJECTIVE AND OBJECTIVE BOX
Patient is a 23y old  Female who presents with a chief complaint of brain tumor (30 Oct 2018 08:22)        HPI:  Phylicia Dyer is a 23-year-old right handed female with history of incidently found left parietal brain tumor found on MRI imaging. This was initially discovered in 2014 and remained asymptomatic. The lesion grew in size in 2016 and continued to grow since last MRI 4/18. She continues to deny headaches, vision changes or gait changes. She is planned for left craniotomy for resection of this suspected low grade lesion on 10/29/18 (Monday) (28 Oct 2018 22:38)  No reports of vision numbness or tingling - no seizures    Allergies  adhesives (Rash)  penicillin (Rash)  sulfa drugs (Rash)      Health Issues  BRAIN LESION  Handoff  Brain lesion  No pertinent past medical history  Brain tumor  No significant past surgical history        FAMILY HISTORY:      MEDICATIONS  (STANDING):  acetaminophen  IVPB .. 1000 milliGRAM(s) IV Intermittent once  dexamethasone     Tablet 4 milliGRAM(s) Oral every 6 hours  dextrose 5%. 1000 milliLiter(s) (50 mL/Hr) IV Continuous <Continuous>  dextrose 50% Injectable 12.5 Gram(s) IV Push once  dextrose 50% Injectable 25 Gram(s) IV Push once  dextrose 50% Injectable 25 Gram(s) IV Push once  influenza   Vaccine 0.5 milliLiter(s) IntraMuscular once  insulin lispro (HumaLOG) corrective regimen sliding scale   SubCutaneous Before meals and at bedtime  levETIRAcetam 500 milliGRAM(s) Oral every 12 hours  pantoprazole    Tablet 40 milliGRAM(s) Oral before breakfast  sodium chloride 0.9%. 1000 milliLiter(s) (75 mL/Hr) IV Continuous <Continuous>    MEDICATIONS  (PRN):  acetaminophen   Tablet .. 650 milliGRAM(s) Oral every 6 hours PRN Mild Pain (1 - 3)  dextrose 40% Gel 15 Gram(s) Oral once PRN Blood Glucose LESS THAN 70 milliGRAM(s)/deciliter  glucagon  Injectable 1 milliGRAM(s) IntraMuscular once PRN Glucose LESS THAN 70 milligrams/deciliter  traMADol 25 milliGRAM(s) Oral every 6 hours PRN Moderate Pain (4 - 6)      PAST MEDICAL & SURGICAL HISTORY:  Brain lesion  No significant past surgical history      Labs                          9.2    13.3  )-----------( 229      ( 30 Oct 2018 05:24 )             27.8     10-30    138  |  102  |  8   ----------------------------<  139<H>  4.1   |  21<L>  |  0.53    Ca    9.4      30 Oct 2018 05:24  Phos  4.7     10-30  Mg     2.4     10-30        Radiology:    Physical Exam    MENTAL STATUS  -Level of Consciousness- awake    Orientation- person, place time  Language- aphasia/ dysarthria- nl  Memory- recent and remote- nl      Cranial Nerve 1- 12  Pupils- equal and reactive  Eye movements- full  Facial - no asymmetry   Lower CN-nl    Gait and Station- n/a    MOTOR  Upper- no drift   Lower- no foot drop    Reflexes- intact    Sensation- no sensory level    Cerebellar- no tremors    vascular - no bruits    Assessment- Brain mass     Plan as per Dr Gardiner

## 2018-10-31 LAB
ANION GAP SERPL CALC-SCNC: 13 MMOL/L — SIGNIFICANT CHANGE UP (ref 5–17)
BUN SERPL-MCNC: 7 MG/DL — SIGNIFICANT CHANGE UP (ref 7–23)
CALCIUM SERPL-MCNC: 8.9 MG/DL — SIGNIFICANT CHANGE UP (ref 8.4–10.5)
CHLORIDE SERPL-SCNC: 101 MMOL/L — SIGNIFICANT CHANGE UP (ref 96–108)
CO2 SERPL-SCNC: 23 MMOL/L — SIGNIFICANT CHANGE UP (ref 22–31)
CREAT SERPL-MCNC: 0.56 MG/DL — SIGNIFICANT CHANGE UP (ref 0.5–1.3)
GLUCOSE BLDC GLUCOMTR-MCNC: 112 MG/DL — HIGH (ref 70–99)
GLUCOSE BLDC GLUCOMTR-MCNC: 119 MG/DL — HIGH (ref 70–99)
GLUCOSE BLDC GLUCOMTR-MCNC: 122 MG/DL — HIGH (ref 70–99)
GLUCOSE BLDC GLUCOMTR-MCNC: 98 MG/DL — SIGNIFICANT CHANGE UP (ref 70–99)
GLUCOSE SERPL-MCNC: 99 MG/DL — SIGNIFICANT CHANGE UP (ref 70–99)
HCT VFR BLD CALC: 25.3 % — LOW (ref 34.5–45)
HCT VFR BLD CALC: 29.4 % — LOW (ref 34.5–45)
HGB BLD-MCNC: 8.1 G/DL — LOW (ref 11.5–15.5)
HGB BLD-MCNC: 9.4 G/DL — LOW (ref 11.5–15.5)
MAGNESIUM SERPL-MCNC: 1.8 MG/DL — SIGNIFICANT CHANGE UP (ref 1.6–2.6)
MCHC RBC-ENTMCNC: 29.1 PG — SIGNIFICANT CHANGE UP (ref 27–34)
MCHC RBC-ENTMCNC: 29.2 PG — SIGNIFICANT CHANGE UP (ref 27–34)
MCHC RBC-ENTMCNC: 32 G/DL — SIGNIFICANT CHANGE UP (ref 32–36)
MCHC RBC-ENTMCNC: 32 G/DL — SIGNIFICANT CHANGE UP (ref 32–36)
MCV RBC AUTO: 91 FL — SIGNIFICANT CHANGE UP (ref 80–100)
MCV RBC AUTO: 91.3 FL — SIGNIFICANT CHANGE UP (ref 80–100)
PHOSPHATE SERPL-MCNC: 2.9 MG/DL — SIGNIFICANT CHANGE UP (ref 2.5–4.5)
PLATELET # BLD AUTO: 231 K/UL — SIGNIFICANT CHANGE UP (ref 150–400)
PLATELET # BLD AUTO: 260 K/UL — SIGNIFICANT CHANGE UP (ref 150–400)
POTASSIUM SERPL-MCNC: 3.7 MMOL/L — SIGNIFICANT CHANGE UP (ref 3.5–5.3)
POTASSIUM SERPL-SCNC: 3.7 MMOL/L — SIGNIFICANT CHANGE UP (ref 3.5–5.3)
RBC # BLD: 2.77 M/UL — LOW (ref 3.8–5.2)
RBC # BLD: 3.23 M/UL — LOW (ref 3.8–5.2)
RBC # FLD: 14.9 % — SIGNIFICANT CHANGE UP (ref 10.3–16.9)
RBC # FLD: 15.1 % — SIGNIFICANT CHANGE UP (ref 10.3–16.9)
SODIUM SERPL-SCNC: 137 MMOL/L — SIGNIFICANT CHANGE UP (ref 135–145)
WBC # BLD: 11.5 K/UL — HIGH (ref 3.8–10.5)
WBC # BLD: 11.7 K/UL — HIGH (ref 3.8–10.5)
WBC # FLD AUTO: 11.5 K/UL — HIGH (ref 3.8–10.5)
WBC # FLD AUTO: 11.7 K/UL — HIGH (ref 3.8–10.5)

## 2018-10-31 PROCEDURE — 99233 SBSQ HOSP IP/OBS HIGH 50: CPT | Mod: 24

## 2018-10-31 RX ORDER — OXYCODONE HYDROCHLORIDE 5 MG/1
15 TABLET ORAL EVERY 6 HOURS
Qty: 0 | Refills: 0 | Status: DISCONTINUED | OUTPATIENT
Start: 2018-10-31 | End: 2018-11-01

## 2018-10-31 RX ORDER — ALPRAZOLAM 0.25 MG
1 TABLET ORAL ONCE
Qty: 0 | Refills: 0 | Status: DISCONTINUED | OUTPATIENT
Start: 2018-10-31 | End: 2018-10-31

## 2018-10-31 RX ORDER — SODIUM CHLORIDE 9 MG/ML
1000 INJECTION INTRAMUSCULAR; INTRAVENOUS; SUBCUTANEOUS ONCE
Qty: 0 | Refills: 0 | Status: COMPLETED | OUTPATIENT
Start: 2018-10-31 | End: 2018-10-31

## 2018-10-31 RX ORDER — SCOPALAMINE 1 MG/3D
1 PATCH, EXTENDED RELEASE TRANSDERMAL ONCE
Qty: 0 | Refills: 0 | Status: COMPLETED | OUTPATIENT
Start: 2018-10-31 | End: 2018-10-31

## 2018-10-31 RX ORDER — ENOXAPARIN SODIUM 100 MG/ML
40 INJECTION SUBCUTANEOUS AT BEDTIME
Qty: 0 | Refills: 0 | Status: DISCONTINUED | OUTPATIENT
Start: 2018-10-31 | End: 2018-10-31

## 2018-10-31 RX ORDER — ALPRAZOLAM 0.25 MG
0.5 TABLET ORAL ONCE
Qty: 0 | Refills: 0 | Status: DISCONTINUED | OUTPATIENT
Start: 2018-10-31 | End: 2018-10-31

## 2018-10-31 RX ORDER — MAGNESIUM OXIDE 400 MG ORAL TABLET 241.3 MG
400 TABLET ORAL ONCE
Qty: 0 | Refills: 0 | Status: COMPLETED | OUTPATIENT
Start: 2018-10-31 | End: 2018-10-31

## 2018-10-31 RX ORDER — SODIUM CHLORIDE 0.65 %
1 AEROSOL, SPRAY (ML) NASAL
Qty: 0 | Refills: 0 | Status: DISCONTINUED | OUTPATIENT
Start: 2018-10-31 | End: 2018-11-01

## 2018-10-31 RX ORDER — POTASSIUM CHLORIDE 20 MEQ
20 PACKET (EA) ORAL ONCE
Qty: 0 | Refills: 0 | Status: COMPLETED | OUTPATIENT
Start: 2018-10-31 | End: 2018-10-31

## 2018-10-31 RX ORDER — ACETAMINOPHEN 500 MG
1000 TABLET ORAL ONCE
Qty: 0 | Refills: 0 | Status: COMPLETED | OUTPATIENT
Start: 2018-10-31 | End: 2018-10-31

## 2018-10-31 RX ORDER — OXYCODONE HYDROCHLORIDE 5 MG/1
30 TABLET ORAL EVERY 6 HOURS
Qty: 0 | Refills: 0 | Status: DISCONTINUED | OUTPATIENT
Start: 2018-10-31 | End: 2018-11-01

## 2018-10-31 RX ORDER — SODIUM,POTASSIUM PHOSPHATES 278-250MG
1 POWDER IN PACKET (EA) ORAL
Qty: 0 | Refills: 0 | Status: COMPLETED | OUTPATIENT
Start: 2018-10-31 | End: 2018-10-31

## 2018-10-31 RX ADMIN — TRAMADOL HYDROCHLORIDE 25 MILLIGRAM(S): 50 TABLET ORAL at 02:35

## 2018-10-31 RX ADMIN — Medication 400 MILLIGRAM(S): at 19:00

## 2018-10-31 RX ADMIN — SCOPALAMINE 1 PATCH: 1 PATCH, EXTENDED RELEASE TRANSDERMAL at 10:33

## 2018-10-31 RX ADMIN — TRAMADOL HYDROCHLORIDE 25 MILLIGRAM(S): 50 TABLET ORAL at 01:12

## 2018-10-31 RX ADMIN — Medication 1 PACKET(S): at 19:07

## 2018-10-31 RX ADMIN — Medication 400 MILLIGRAM(S): at 09:00

## 2018-10-31 RX ADMIN — Medication 650 MILLIGRAM(S): at 00:35

## 2018-10-31 RX ADMIN — MAGNESIUM OXIDE 400 MG ORAL TABLET 400 MILLIGRAM(S): 241.3 TABLET ORAL at 10:34

## 2018-10-31 RX ADMIN — Medication 650 MILLIGRAM(S): at 15:30

## 2018-10-31 RX ADMIN — Medication 1 PACKET(S): at 10:32

## 2018-10-31 RX ADMIN — LEVETIRACETAM 500 MILLIGRAM(S): 250 TABLET, FILM COATED ORAL at 02:51

## 2018-10-31 RX ADMIN — Medication 4 MILLIGRAM(S): at 18:47

## 2018-10-31 RX ADMIN — Medication 1000 MILLIGRAM(S): at 19:19

## 2018-10-31 RX ADMIN — PANTOPRAZOLE SODIUM 40 MILLIGRAM(S): 20 TABLET, DELAYED RELEASE ORAL at 06:16

## 2018-10-31 RX ADMIN — Medication 4 MILLIGRAM(S): at 05:54

## 2018-10-31 RX ADMIN — ONDANSETRON 4 MILLIGRAM(S): 8 TABLET, FILM COATED ORAL at 08:02

## 2018-10-31 RX ADMIN — Medication 20 MILLIEQUIVALENT(S): at 10:35

## 2018-10-31 RX ADMIN — LEVETIRACETAM 500 MILLIGRAM(S): 250 TABLET, FILM COATED ORAL at 14:52

## 2018-10-31 RX ADMIN — SCOPALAMINE 1 PATCH: 1 PATCH, EXTENDED RELEASE TRANSDERMAL at 19:20

## 2018-10-31 RX ADMIN — Medication 650 MILLIGRAM(S): at 14:52

## 2018-10-31 RX ADMIN — SODIUM CHLORIDE 2000 MILLILITER(S): 9 INJECTION INTRAMUSCULAR; INTRAVENOUS; SUBCUTANEOUS at 02:00

## 2018-10-31 RX ADMIN — Medication 1000 MILLIGRAM(S): at 09:15

## 2018-10-31 NOTE — PROGRESS NOTE ADULT - SUBJECTIVE AND OBJECTIVE BOX
S/Overnight events:    No acute events overnight. Pt states she is in a "little discomfort" but the pain is being managed. She denies nausea, vomiting, and headaches this AM.     Hospital Course:   10/29: POD#0 left craniotomy and gross total resection of brain tumor  10/30: POD#1: No acute events overnight.   10/31: POD#2: No acute events overnight. Yesterday pt had bouts of emesis and nausea, but denies HA/N/V today. States pain is well-controlled.     Vital Signs Last 24 Hrs  T(C): 37.6 (31 Oct 2018 08:56), Max: 37.6 (31 Oct 2018 01:28)  T(F): 99.7 (31 Oct 2018 08:56), Max: 99.7 (31 Oct 2018 08:56)  HR: 92 (31 Oct 2018 11:00) (58 - 108)  BP: 115/70 (31 Oct 2018 11:00) (87/62 - 115/70)  BP(mean): 85 (31 Oct 2018 11:00) (62 - 85)  RR: 16 (31 Oct 2018 11:00) (11 - 27)  SpO2: 98% (31 Oct 2018 11:00) (97% - 100%)    I&O's Detail    30 Oct 2018 07:01  -  31 Oct 2018 07:00  --------------------------------------------------------  IN:    Oral Fluid: 480 mL    sodium chloride 0.9%: 550 mL    sodium chloride 0.9%: 800 mL    Sodium Chloride 0.9% IV Bolus: 1000 mL  Total IN: 2830 mL    OUT:    Emesis: 800 mL    Indwelling Catheter - Urethral: 460 mL    Voided: 1700 mL  Total OUT: 2960 mL    Total NET: -130 mL      31 Oct 2018 07:01  -  31 Oct 2018 11:48  --------------------------------------------------------  IN:    IV PiggyBack: 100 mL    sodium chloride 0.9%: 87.5 mL  Total IN: 187.5 mL    OUT:    Voided: 900 mL  Total OUT: 900 mL    Total NET: -712.5 mL        I&O's Summary    30 Oct 2018 07:01  -  31 Oct 2018 07:00  --------------------------------------------------------  IN: 2830 mL / OUT: 2960 mL / NET: -130 mL    31 Oct 2018 07:01  -  31 Oct 2018 11:48  --------------------------------------------------------  IN: 187.5 mL / OUT: 900 mL / NET: -712.5 mL        PHYSICAL EXAM:  General: NAD, AA&Ox3  HEENT: Left parietal incision is C/D/I, PERRL, EOMI, no facial asymmetry, tongue midline.   Neurological: CNII-XII intact, SILT, strength is 5/5 x 4 extremities, speech is clear, follows commands  Cardiovascular: +S1, +S2, RRR	  Respiratory: CTA bilaterally, unlabored breathing on RA, no rales, rhonchi, or wheezing noted.   Gastrointestinal: normoactive BS, non-tender, non-distended, soft abd.  Genitourinary: voiding  Extremities: warm, well-perfused, no edema noted x4 extremities    DEVICE/DRAIN DRESSING:    DIET:  [] NPO  [X] Mechanical  [] Tube feeds    LABS:                        8.1    11.5  )-----------( 231      ( 31 Oct 2018 06:24 )             25.3     10-31    137  |  101  |  7   ----------------------------<  99  3.7   |  23  |  0.56    Ca    8.9      31 Oct 2018 06:24  Phos  2.9     10-31  Mg     1.8     10-31      CAPILLARY BLOOD GLUCOSE      POCT Blood Glucose.: 112 mg/dL (31 Oct 2018 10:21)  POCT Blood Glucose.: 98 mg/dL (31 Oct 2018 06:23)  POCT Blood Glucose.: 124 mg/dL (30 Oct 2018 21:40)  POCT Blood Glucose.: 114 mg/dL (30 Oct 2018 16:42)      Drug Levels: [] N/A    CSF Analysis: [] N/A      Allergies    adhesives (Rash)  penicillin (Rash)  sulfa drugs (Rash)    Intolerances      MEDICATIONS:  Antibiotics:    Neuro:  acetaminophen   Tablet .. 650 milliGRAM(s) Oral every 6 hours PRN  acetaminophen  IVPB .. 1000 milliGRAM(s) IV Intermittent once  levETIRAcetam 500 milliGRAM(s) Oral every 12 hours  ondansetron Injectable 4 milliGRAM(s) IV Push every 6 hours PRN    Anticoagulation:    OTHER:  dexamethasone     Tablet   Oral   dexamethasone     Tablet 4 milliGRAM(s) Oral every 12 hours  dextrose 40% Gel 15 Gram(s) Oral once PRN  dextrose 50% Injectable 12.5 Gram(s) IV Push once  dextrose 50% Injectable 25 Gram(s) IV Push once  dextrose 50% Injectable 25 Gram(s) IV Push once  glucagon  Injectable 1 milliGRAM(s) IntraMuscular once PRN  influenza   Vaccine 0.5 milliLiter(s) IntraMuscular once  insulin lispro (HumaLOG) corrective regimen sliding scale   SubCutaneous Before meals and at bedtime  metoclopramide 10 milliGRAM(s) Oral every 8 hours PRN  pantoprazole    Tablet 40 milliGRAM(s) Oral before breakfast    IVF:  dextrose 5%. 1000 milliLiter(s) IV Continuous <Continuous>  potassium phosphate / sodium phosphate powder 1 Packet(s) Oral two times a day with meals    CULTURES:    RADIOLOGY & ADDITIONAL TESTS:      ASSESSMENT:  23y Female with no significant PMH is POD#2 from left craniotomy for gross total resection of brain tumor.  Frozen: Low grade glioma     BRAIN LESION  Handoff  Brain lesion  No pertinent past medical history  Brain tumor  No significant past surgical history      PLAN:  NEURO:  -q4 neuro checks   -Decadron PO taper   -Keppra 500mg BID for seizure ppx  -Tylenol PRN for pain   -Post-op MRI needed    CARDIOVASCULAR:  -normotensive     PULMONARY:  -RA  -encourage IS     RENAL:  -encourage PO intake   -replete electrolytes PRN    GI:  -Reglan and Zofran PRN for nausea  -Scopolamine patch for nausea   -PPI ppx while on steroids   -regular diet     HEME:  -daily CBC-trend H&H    ID:  -afebrile  -leukocytosis--probable steroid induced     ENDO:  -ISS     DVT PROPHYLAXIS:  -SCDs   -hold SQH until afternoon CBC     DISPOSITION:   -Full code  -Step down status   -OOB    -Discussed with Dr. Gardiner and Dr. Kirkpatrick S/Overnight events:    No acute events overnight. Pt states she is in a "little discomfort" but the pain is being managed. She denies nausea, vomiting, and headaches this AM.     Hospital Course:   10/29: POD#0 left craniotomy and gross total resection of brain tumor  10/30: POD#1: No acute events overnight.   10/31: POD#2: No acute events overnight. Yesterday pt had bouts of emesis and nausea, but denies HA/N/V today. States pain is well-controlled.     Vital Signs Last 24 Hrs  T(C): 37.6 (31 Oct 2018 08:56), Max: 37.6 (31 Oct 2018 01:28)  T(F): 99.7 (31 Oct 2018 08:56), Max: 99.7 (31 Oct 2018 08:56)  HR: 92 (31 Oct 2018 11:00) (58 - 108)  BP: 115/70 (31 Oct 2018 11:00) (87/62 - 115/70)  BP(mean): 85 (31 Oct 2018 11:00) (62 - 85)  RR: 16 (31 Oct 2018 11:00) (11 - 27)  SpO2: 98% (31 Oct 2018 11:00) (97% - 100%)    I&O's Detail    30 Oct 2018 07:01  -  31 Oct 2018 07:00  --------------------------------------------------------  IN:    Oral Fluid: 480 mL    sodium chloride 0.9%: 550 mL    sodium chloride 0.9%: 800 mL    Sodium Chloride 0.9% IV Bolus: 1000 mL  Total IN: 2830 mL    OUT:    Emesis: 800 mL    Indwelling Catheter - Urethral: 460 mL    Voided: 1700 mL  Total OUT: 2960 mL    Total NET: -130 mL      31 Oct 2018 07:01  -  31 Oct 2018 11:48  --------------------------------------------------------  IN:    IV PiggyBack: 100 mL    sodium chloride 0.9%: 87.5 mL  Total IN: 187.5 mL    OUT:    Voided: 900 mL  Total OUT: 900 mL    Total NET: -712.5 mL        I&O's Summary    30 Oct 2018 07:01  -  31 Oct 2018 07:00  --------------------------------------------------------  IN: 2830 mL / OUT: 2960 mL / NET: -130 mL    31 Oct 2018 07:01  -  31 Oct 2018 11:48  --------------------------------------------------------  IN: 187.5 mL / OUT: 900 mL / NET: -712.5 mL        PHYSICAL EXAM:  General: NAD, AA&Ox3  HEENT: Left parietal incision is C/D/I, PERRL, EOMI  Neurological: CNII-XII intact, SILT, strength is 5/5 x 4 extremities, speech is clear, follows commands  Cardiovascular: +S1, +S2, RRR	  Respiratory: CTA bilaterally, unlabored breathing on RA, no rales, rhonchi, or wheezing noted.   Gastrointestinal: normoactive BS, non-tender, non-distended, soft abd.  Genitourinary: voiding  Extremities: warm, well-perfused, no edema noted x4 extremities    DEVICE/DRAIN DRESSING:    DIET:  [] NPO  [X] Mechanical  [] Tube feeds    LABS:                        8.1    11.5  )-----------( 231      ( 31 Oct 2018 06:24 )             25.3     10-31    137  |  101  |  7   ----------------------------<  99  3.7   |  23  |  0.56    Ca    8.9      31 Oct 2018 06:24  Phos  2.9     10-31  Mg     1.8     10-31      CAPILLARY BLOOD GLUCOSE      POCT Blood Glucose.: 112 mg/dL (31 Oct 2018 10:21)  POCT Blood Glucose.: 98 mg/dL (31 Oct 2018 06:23)  POCT Blood Glucose.: 124 mg/dL (30 Oct 2018 21:40)  POCT Blood Glucose.: 114 mg/dL (30 Oct 2018 16:42)      Drug Levels: [] N/A    CSF Analysis: [] N/A      Allergies    adhesives (Rash)  penicillin (Rash)  sulfa drugs (Rash)    Intolerances      MEDICATIONS:  Antibiotics:    Neuro:  acetaminophen   Tablet .. 650 milliGRAM(s) Oral every 6 hours PRN  acetaminophen  IVPB .. 1000 milliGRAM(s) IV Intermittent once  levETIRAcetam 500 milliGRAM(s) Oral every 12 hours  ondansetron Injectable 4 milliGRAM(s) IV Push every 6 hours PRN    Anticoagulation:    OTHER:  dexamethasone     Tablet   Oral   dexamethasone     Tablet 4 milliGRAM(s) Oral every 12 hours  dextrose 40% Gel 15 Gram(s) Oral once PRN  dextrose 50% Injectable 12.5 Gram(s) IV Push once  dextrose 50% Injectable 25 Gram(s) IV Push once  dextrose 50% Injectable 25 Gram(s) IV Push once  glucagon  Injectable 1 milliGRAM(s) IntraMuscular once PRN  influenza   Vaccine 0.5 milliLiter(s) IntraMuscular once  insulin lispro (HumaLOG) corrective regimen sliding scale   SubCutaneous Before meals and at bedtime  metoclopramide 10 milliGRAM(s) Oral every 8 hours PRN  pantoprazole    Tablet 40 milliGRAM(s) Oral before breakfast    IVF:  dextrose 5%. 1000 milliLiter(s) IV Continuous <Continuous>  potassium phosphate / sodium phosphate powder 1 Packet(s) Oral two times a day with meals    CULTURES:    RADIOLOGY & ADDITIONAL TESTS:      ASSESSMENT:  23y Female with no significant PMH is POD#2 from left craniotomy for gross total resection of brain tumor.  Frozen: Low grade glioma     BRAIN LESION  Handoff  Brain lesion  No pertinent past medical history  Brain tumor  No significant past surgical history      PLAN:  NEURO:  -q4 neuro checks   -Decadron PO taper   -Keppra 500mg BID for seizure ppx  -Tylenol PRN for pain   -Post-op MRI needed    CARDIOVASCULAR:  -normotensive     PULMONARY:  -RA  -encourage IS     RENAL:  -encourage PO intake   -replete electrolytes PRN    GI:  -Reglan and Zofran PRN for nausea  -Scopolamine patch for nausea   -PPI ppx while on steroids   -regular diet     HEME:  -daily CBC-trend H&H    ID:  -afebrile  -leukocytosis--probable steroid induced     ENDO:  -ISS     DVT PROPHYLAXIS:  -SCDs   -hold SQH until afternoon CBC     DISPOSITION:   -Full code  -Step down status   -OOB    -Discussed with Dr. Gardiner and Dr. Kirkpatrick S/Overnight events:    No acute events overnight. Pt states she is in a "little discomfort" but the pain is being managed. She denies nausea, vomiting, and headaches this AM.     Hospital Course:   10/29: POD#0 left craniotomy and gross total resection of brain tumor  10/30: POD#1: No acute events overnight.   10/31: POD#2: No acute events overnight. Yesterday pt had bouts of emesis and nausea, admits to nausea this monring but denies HA/V today. States pain is well-controlled.     Vital Signs Last 24 Hrs  T(C): 37.6 (31 Oct 2018 08:56), Max: 37.6 (31 Oct 2018 01:28)  T(F): 99.7 (31 Oct 2018 08:56), Max: 99.7 (31 Oct 2018 08:56)  HR: 92 (31 Oct 2018 11:00) (58 - 108)  BP: 115/70 (31 Oct 2018 11:00) (87/62 - 115/70)  BP(mean): 85 (31 Oct 2018 11:00) (62 - 85)  RR: 16 (31 Oct 2018 11:00) (11 - 27)  SpO2: 98% (31 Oct 2018 11:00) (97% - 100%)    I&O's Detail    30 Oct 2018 07:01  -  31 Oct 2018 07:00  --------------------------------------------------------  IN:    Oral Fluid: 480 mL    sodium chloride 0.9%: 550 mL    sodium chloride 0.9%: 800 mL    Sodium Chloride 0.9% IV Bolus: 1000 mL  Total IN: 2830 mL    OUT:    Emesis: 800 mL    Indwelling Catheter - Urethral: 460 mL    Voided: 1700 mL  Total OUT: 2960 mL    Total NET: -130 mL      31 Oct 2018 07:01  -  31 Oct 2018 11:48  --------------------------------------------------------  IN:    IV PiggyBack: 100 mL    sodium chloride 0.9%: 87.5 mL  Total IN: 187.5 mL    OUT:    Voided: 900 mL  Total OUT: 900 mL    Total NET: -712.5 mL        I&O's Summary    30 Oct 2018 07:01  -  31 Oct 2018 07:00  --------------------------------------------------------  IN: 2830 mL / OUT: 2960 mL / NET: -130 mL    31 Oct 2018 07:01  -  31 Oct 2018 11:48  --------------------------------------------------------  IN: 187.5 mL / OUT: 900 mL / NET: -712.5 mL        PHYSICAL EXAM:  General: NAD, AA&Ox3  HEENT: Left parietal incision is C/D/I, PERRL, EOMI  Neurological: CNII-XII intact, SILT, strength is 5/5 x 4 extremities, speech is clear, follows commands  Cardiovascular: +S1, +S2, RRR	  Respiratory: CTA bilaterally, unlabored breathing on RA, no rales, rhonchi, or wheezing noted.   Gastrointestinal: normoactive BS, non-tender, non-distended, soft abd.  Genitourinary: voiding  Extremities: warm, well-perfused, no edema noted x4 extremities    DEVICE/DRAIN DRESSING:    DIET:  [] NPO  [X] Mechanical  [] Tube feeds    LABS:                        8.1    11.5  )-----------( 231      ( 31 Oct 2018 06:24 )             25.3     10-31    137  |  101  |  7   ----------------------------<  99  3.7   |  23  |  0.56    Ca    8.9      31 Oct 2018 06:24  Phos  2.9     10-31  Mg     1.8     10-31      CAPILLARY BLOOD GLUCOSE      POCT Blood Glucose.: 112 mg/dL (31 Oct 2018 10:21)  POCT Blood Glucose.: 98 mg/dL (31 Oct 2018 06:23)  POCT Blood Glucose.: 124 mg/dL (30 Oct 2018 21:40)  POCT Blood Glucose.: 114 mg/dL (30 Oct 2018 16:42)      Drug Levels: [] N/A    CSF Analysis: [] N/A      Allergies    adhesives (Rash)  penicillin (Rash)  sulfa drugs (Rash)    Intolerances      MEDICATIONS:  Antibiotics:    Neuro:  acetaminophen   Tablet .. 650 milliGRAM(s) Oral every 6 hours PRN  acetaminophen  IVPB .. 1000 milliGRAM(s) IV Intermittent once  levETIRAcetam 500 milliGRAM(s) Oral every 12 hours  ondansetron Injectable 4 milliGRAM(s) IV Push every 6 hours PRN    Anticoagulation:    OTHER:  dexamethasone     Tablet   Oral   dexamethasone     Tablet 4 milliGRAM(s) Oral every 12 hours  dextrose 40% Gel 15 Gram(s) Oral once PRN  dextrose 50% Injectable 12.5 Gram(s) IV Push once  dextrose 50% Injectable 25 Gram(s) IV Push once  dextrose 50% Injectable 25 Gram(s) IV Push once  glucagon  Injectable 1 milliGRAM(s) IntraMuscular once PRN  influenza   Vaccine 0.5 milliLiter(s) IntraMuscular once  insulin lispro (HumaLOG) corrective regimen sliding scale   SubCutaneous Before meals and at bedtime  metoclopramide 10 milliGRAM(s) Oral every 8 hours PRN  pantoprazole    Tablet 40 milliGRAM(s) Oral before breakfast    IVF:  dextrose 5%. 1000 milliLiter(s) IV Continuous <Continuous>  potassium phosphate / sodium phosphate powder 1 Packet(s) Oral two times a day with meals    CULTURES:    RADIOLOGY & ADDITIONAL TESTS:      ASSESSMENT:  23y Female with no significant PMH is POD#2 from left craniotomy for gross total resection of brain tumor.  Frozen: Low grade glioma     BRAIN LESION  Handoff  Brain lesion  No pertinent past medical history  Brain tumor  No significant past surgical history      PLAN:  NEURO:  -q4 neuro checks   -Decadron PO taper   -Keppra 500mg BID for seizure ppx  -Tylenol PRN for pain   -Post-op MRI needed    CARDIOVASCULAR:  -normotensive     PULMONARY:  -RA  -encourage IS     RENAL:  -encourage PO intake   -replete electrolytes PRN    GI:  -Reglan and Zofran PRN for nausea  -Scopolamine patch for nausea   -PPI ppx while on steroids   -regular diet     HEME:  -daily CBC-trend H&H    ID:  -afebrile  -leukocytosis--probable steroid induced     ENDO:  -ISS     DVT PROPHYLAXIS:  -SCDs   -hold SQH until afternoon CBC     DISPOSITION:   -Full code  -Step down status   -OOB    -Discussed with Dr. Gardiner and Dr. Kirkpatrick

## 2018-10-31 NOTE — PROGRESS NOTE ADULT - SUBJECTIVE AND OBJECTIVE BOX
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 This was initially discovered in 2014 and remained asymptomatic. The lesion grew in size in 2016 and continued to grow since last MRI 4/18. She continues to deny headaches, vision changes or gait changes. She is planned for left craniotomy for resection of this suspected low grade lesion on 10/29/18 (Monday) (28 Oct 2018 22:38)    OVERNIGHT EVENTS:    Hospital course:      Vital Signs Last 24 Hrs  T(C): 36.7 (31 Oct 2018 05:28), Max: 37.6 (31 Oct 2018 01:28)  T(F): 98.1 (31 Oct 2018 05:28), Max: 99.6 (31 Oct 2018 01:28)  HR: 66 (31 Oct 2018 07:00) (58 - 108)  BP: 109/58 (31 Oct 2018 07:00) (87/62 - 117/78)  BP(mean): 72 (31 Oct 2018 07:00) (62 - 92)  RR: 15 (31 Oct 2018 07:00) (11 - 27)  SpO2: 98% (31 Oct 2018 07:00) (97% - 100%)    I&O's Detail    30 Oct 2018 07:01  -  31 Oct 2018 07:00  --------------------------------------------------------  IN:    Oral Fluid: 480 mL    sodium chloride 0.9%: 800 mL    Sodium Chloride 0.9% IV Bolus: 1000 mL    sodium chloride 0.9%.: 550 mL  Total IN: 2830 mL    OUT:    Emesis: 800 mL    Indwelling Catheter - Urethral: 460 mL    Voided: 1700 mL  Total OUT: 2960 mL    Total NET: -130 mL      31 Oct 2018 07:01  -  31 Oct 2018 07:30  --------------------------------------------------------  IN:    sodium chloride 0.9%.: 50 mL  Total IN: 50 mL    OUT:  Total OUT: 0 mL    Total NET: 50 mL        I&O's Summary    30 Oct 2018 07:01  -  31 Oct 2018 07:00  --------------------------------------------------------  IN: 2830 mL / OUT: 2960 mL / NET: -130 mL    31 Oct 2018 07:01  -  31 Oct 2018 07:30  --------------------------------------------------------  IN: 50 mL / OUT: 0 mL / NET: 50 mL        PHYSICAL EXAM:  Gen:  Neurological:  CN II-XII  Motor exam:  Cardiovascular:  Respiratory:  Gastrointestinal:  Incision/Wound:    TUBES/LINES:  [] CVC  [] A-line  [] Lumbar Drain  [] Ventriculostomy  [] Other    DIET:  [] NPO  [] Mechanical  [] Tube feeds    LABS:                        8.1    11.5  )-----------( 231      ( 31 Oct 2018 06:24 )             25.3     10-31    137  |  101  |  7   ----------------------------<  99  3.7   |  23  |  0.56    Ca    8.9      31 Oct 2018 06:24  Phos  2.9     10-31  Mg     1.8     10-31              CAPILLARY BLOOD GLUCOSE      POCT Blood Glucose.: 98 mg/dL (31 Oct 2018 06:23)  POCT Blood Glucose.: 124 mg/dL (30 Oct 2018 21:40)  POCT Blood Glucose.: 114 mg/dL (30 Oct 2018 16:42)  POCT Blood Glucose.: 124 mg/dL (30 Oct 2018 10:50)      Drug Levels: [] N/A    CSF Analysis: [] N/A      Allergies    adhesives (Rash)  penicillin (Rash)  sulfa drugs (Rash)    Intolerances      MEDICATIONS:  Antibiotics:    Neuro:  acetaminophen   Tablet .. 650 milliGRAM(s) Oral every 6 hours PRN  levETIRAcetam 500 milliGRAM(s) Oral every 12 hours  ondansetron Injectable 4 milliGRAM(s) IV Push every 6 hours PRN  traMADol 25 milliGRAM(s) Oral every 6 hours PRN    Anticoagulation:    OTHER:  dexamethasone     Tablet   Oral   dexamethasone     Tablet 4 milliGRAM(s) Oral every 12 hours  dextrose 40% Gel 15 Gram(s) Oral once PRN  dextrose 50% Injectable 12.5 Gram(s) IV Push once  dextrose 50% Injectable 25 Gram(s) IV Push once  dextrose 50% Injectable 25 Gram(s) IV Push once  glucagon  Injectable 1 milliGRAM(s) IntraMuscular once PRN  influenza   Vaccine 0.5 milliLiter(s) IntraMuscular once  insulin lispro (HumaLOG) corrective regimen sliding scale   SubCutaneous Before meals and at bedtime  metoclopramide 10 milliGRAM(s) Oral every 8 hours PRN  pantoprazole    Tablet 40 milliGRAM(s) Oral before breakfast    IVF:  dextrose 5%. 1000 milliLiter(s) IV Continuous <Continuous>  magnesium oxide 400 milliGRAM(s) Oral once  potassium chloride    Tablet ER 20 milliEquivalent(s) Oral once  potassium phosphate / sodium phosphate powder 1 Packet(s) Oral two times a day with meals  sodium chloride 0.9%. 1000 milliLiter(s) IV Continuous <Continuous>    CULTURES:    RADIOLOGY & ADDITIONAL TESTS:      ASSESSMENT:  23y Female s/p    PLAN:  NEURO:    CARDIOVASCULAR:    PULMONARY:    RENAL:    GI:    HEME:    ID:    ENDO:    DVT PROPHYLAXIS: [] Venodynes [] Heparin/Lovenox    DISPOSITION: ICU status, full code     d/w Dr. Koko Cooper

## 2018-10-31 NOTE — PROGRESS NOTE ADULT - ASSESSMENT
23y/F with L parietal mass, brain compression, cerebral edema, s/p L stereotactic crani, resection of brain tumor (10/29/2018, Dr. Gardiner, Dr. Bowden) Frozen: (low grade glioma)    PLAN:   NEURO: neurochecks q2h, PRN pain meds with Tylenol, tramadol  s/p resection of brain mass:  steroid taper as per neurosurgery; f/u final histopathology, MRI on stepdown  seizure prophylaxis: levetiracetam 500mg PO BID  REHAB:  physical therapy evaluation and management    EARLY MOB:  HOB up OOB ambulate    PULM:  room air, incentive spirometry  CARDIO:  SBP goal 100-150mm Hg  ENDO:  Blood sugar goals 140-180 mg/dL, continue insulin sliding scale  GI:  PPI for GI prophylaxis while on steroids  DIET: regular diet  RENAL:  keep fluids until eating adequately   HEM/ONC: H b stable  VTE Prophylaxis: SCDs only, no DVT chemoprophylaxis for now as patient is high risk for bleed (fresh post-op late case) - will re-evaluate tomorrow, baseline LE Doppler for DVT suspected on admission (brain mass)  ID: afebrile, no leukocytosis  Social: family updated this morning    ATTENDING ATTESTATION:  I was physically present for the key portions of the evaluation and management (E/M) service provided.  I agree with the above history, physical and plan, which I have reviewed and edited where appropriate.    Patient at high risk for neurological deterioration or death due to:  ICU delirium, aspiration PNA, DVT / PE, seizures.  Critical care time, excluding procedures: 60 minutes spent on total encounter, more than 50% of the visit was spent counseling and/or coordinating care by the attending physician.     Plan discussed with RN, house staff. 23y/F with L parietal mass, brain compression, cerebral edema, s/p L stereotactic crani, resection of brain tumor (10/29/2018, Dr. Gardiner, Dr. Bowden) Frozen: (low grade glioma)    PLAN:   NEURO: neurochecks q4h, PRN pain meds with Tylenol  s/p resection of brain mass:  steroid taper as per neurosurgery; f/u final histopathology, MRI on stepdown  seizure prophylaxis: levetiracetam 500mg PO BID  REHAB:  physical therapy evaluation and management    EARLY MOB:  HOB up OOB ambulate    PULM:  room air, incentive spirometry  CARDIO:  SBP goal 100-150mm Hg  ENDO:  Blood sugar goals 140-180 mg/dL, continue insulin sliding scale  GI:  PPI for GI prophylaxis while on steroids  DIET: regular diet; scopolamine patch  RENAL:  d/c fluids  HEM/ONC: Hb drop - will trend  VTE Prophylaxis: SCDs, check CBC this PM, SQH tonight if satble CBC, baseline doppler NEG  ID: afebrile, no leukocytosis  Social: mom updated this morning.      ATTENDING ATTESTATION:  I was physically present for the key portions of the evaluation and management (E/M) service provided.  I agree with the above history, physical and plan which I have reviewed and edited where appropriate.     Patient not at high risk for neurologic deterioration / death.  Time spent on this noncritically ill patient: 45 minutes spent on total encounter, more than 50% of the visit was spent counseling and/or coordinating care by the attending physician.    Plan discussed with RN, house staff.

## 2018-10-31 NOTE — PROGRESS NOTE ADULT - SUBJECTIVE AND OBJECTIVE BOX
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  Admitted 10/28 for elective resection of mass.  Overnight Events: s/p resection, complaining of headache    Past Medical History: Brain lesion  No pertinent past medical history  Allergies:  adhesives (Rash) penicillin (Rash) sulfa drugs (Rash)   Home meds:  Accutane 10 mg oral capsule: 1 cap(s) orally 2 times a day    PHYSICAL EXAMINATION  T(C): 36.7 (10-31 @ 05:28), Max: 37.6 (10-31 @ 01:28) HR: 66 (10-31 @ 07:00) (58 - 108) BP: 109/58 (10-31 @ 07:00) (87/62 - 117/78) RR: 15 (10-31 @ 07:00) (11 - 27) SpO2: 98% (10-31 @ 07:00) (97% - 100%)   NEUROLOGIC EXAMINATION:  Patient is awake, alert, fully oriented, pupils 3mm equal and reactive to light, EOMs intact, no facial droop, moving all 4s  GENERAL:  not intubated, not in cardiorespiratory distress  EENT: anicteric  CARDIOVASC:  (+) S1 S2, normal rate and regular rhythm  PULMONARY:  clear to auscultation bilaterally  ABDOMEN:  soft, nontender, with normoactive bowel sounds  EXTREMITIES:  no edema  SKIN:  no rash     LABS:  CAPILLARY BLOOD GLUCOSE 98 124 114 124               8.1    11.5  )-----------( 231      ( 31 Oct 2018 06:24 )             25.3     137  |  101  |  7   ----------------------------<  99  3.7   |  23  |  0.56    Ca    8.9      31 Oct 2018 06:24  Phos  2.9     10-31  Mg     1.8     10-31    10-30 @ 07:01  -  10-31 @ 07:00  IN: 2830 mL / OUT: 2960 mL / NET: -130 mL    HbA1C = 4.8 (10-30)    Bacteriology:  CSF studies:  EEG:  Neuroimaging:    10/29 MRI: L parietal mass, likely low grade neoplasm  Other imaging:  10/30 LE Doppler NEG    MEDICATIONS: dexamethasone 4mg q12h mod ISS levetiracetam 500 q12h metoclopramide PRN pantoprazole 40 daily tramadol 25 q6h PRN    IV FLUIDS: NS@75cc/hr  DRIPS:  DIET: regular  Lines: Nery  Drains:   Morris  Wounds:    CODE STATUS:  Full Code                       GOALS OF CARE:  aggressive                      DISPOSITION:  ICU =================================  NEUROCRITICAL CARE ATTENDING NOTE  =================================    WARD REYES   MRN-[id number]  Summary:  23y/F with no PMH, 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  Admitted 10/28 for elective resection of mass.  Overnight Events: s/p resection, complaining of headache, panic attack   REVIEW OF SYSTEMS:  No headaches, no nausea or vomiting; 14 -point review of systems otherwise unremarkable.    Past Medical History: Brain lesion  No pertinent past medical history  Allergies:  adhesives (Rash) penicillin (Rash) sulfa drugs (Rash)   Home meds:  Accutane 10 mg oral capsule: 1 cap(s) orally 2 times a day    PHYSICAL EXAMINATION  T(C): 36.7 (10-31 @ 05:28), Max: 37.6 (10-31 @ 01:28) HR: 66 (10-31 @ 07:00) (58 - 108) BP: 109/58 (10-31 @ 07:00) (87/62 - 117/78) RR: 15 (10-31 @ 07:00) (11 - 27) SpO2: 98% (10-31 @ 07:00) (97% - 100%)   NEUROLOGIC EXAMINATION:  Patient is awake, alert, fully oriented, pupils 3mm equal and reactive to light, EOMs intact, no facial droop, moving all 4s  GENERAL:  not intubated, not in cardiorespiratory distress  EENT: anicteric  CARDIOVASC:  (+) S1 S2, normal rate and regular rhythm  PULMONARY:  clear to auscultation bilaterally  ABDOMEN:  soft, nontender, with normoactive bowel sounds  EXTREMITIES:  no edema  SKIN:  no rash     LABS:  CAPILLARY BLOOD GLUCOSE 98 124 114 124    (13.3)    8.1  (9.2)  11.5  )-----------( 231      ( 31 Oct 2018 06:24 )             25.3     137  |  101  |  7   ----------------------------<  99  3.7   |  23  |  0.56    Ca    8.9      31 Oct 2018 06:24  Phos  2.9     10-31  Mg     1.8     10-31    10-30 @ 07:01  -  10-31 @ 07:00  IN: 2830 mL / OUT: 2960 mL / NET: -130 mL    HbA1C = 4.8 (10-30)    Bacteriology:  CSF studies:  EEG:  Neuroimaging:    10/29 MRI: L parietal mass, likely low grade neoplasm  Other imaging:  10/30 LE Doppler NEG    MEDICATIONS: dexamethasone 4mg q12h mod ISS levetiracetam 500 q12h metoclopramide PRN pantoprazole 40 daily tramadol 25 q6h PRN    IV FLUIDS: NS@50cc/hr  DRIPS:  DIET: regular  Lines:   Drains:     Wounds:    CODE STATUS:  Full Code                       GOALS OF CARE:  aggressive                      DISPOSITION:  stepdown

## 2018-10-31 NOTE — PROGRESS NOTE ADULT - SUBJECTIVE AND OBJECTIVE BOX
Neurology Follow up note    Name  PHYLICIA DYER    HPI:  Phylicia Dyer is a 23-year-old right handed female with history of incidently found left parietal brain tumor found on MRI imaging. This was initially discovered in 2014 and remained asymptomatic. The lesion grew in size in 2016 and continued to grow since last MRI 4/18. She continues to deny headaches, vision changes or gait changes. She is planned for left craniotomy for resection of this suspected low grade lesion on 10/29/18 (Monday) (28 Oct 2018 22:38)      Interval History - no headaches dizziness or weakness        REVIEW OF SYSTEMS    Vital Signs Last 24 Hrs  T(C): 36.7 (31 Oct 2018 05:28), Max: 37.6 (31 Oct 2018 01:28)  T(F): 98.1 (31 Oct 2018 05:28), Max: 99.6 (31 Oct 2018 01:28)  HR: 66 (31 Oct 2018 07:00) (58 - 108)  BP: 109/58 (31 Oct 2018 07:00) (87/62 - 117/78)  BP(mean): 72 (31 Oct 2018 07:00) (62 - 92)  RR: 15 (31 Oct 2018 07:00) (11 - 27)  SpO2: 98% (31 Oct 2018 07:00) (97% - 100%)    Physical Exam-     Mental Status- awake and alert    Cranial Nerves-nl    Gait and station-n/a    Motor- nl    Reflexes- nl    Sensation- no sensory level    Coordination- no tremors    Vascular - no bruits    Medications  acetaminophen   Tablet .. 650 milliGRAM(s) Oral every 6 hours PRN  dexamethasone     Tablet   Oral   dexamethasone     Tablet 4 milliGRAM(s) Oral every 12 hours  dextrose 40% Gel 15 Gram(s) Oral once PRN  dextrose 5%. 1000 milliLiter(s) IV Continuous <Continuous>  dextrose 50% Injectable 12.5 Gram(s) IV Push once  dextrose 50% Injectable 25 Gram(s) IV Push once  dextrose 50% Injectable 25 Gram(s) IV Push once  glucagon  Injectable 1 milliGRAM(s) IntraMuscular once PRN  influenza   Vaccine 0.5 milliLiter(s) IntraMuscular once  insulin lispro (HumaLOG) corrective regimen sliding scale   SubCutaneous Before meals and at bedtime  levETIRAcetam 500 milliGRAM(s) Oral every 12 hours  magnesium oxide 400 milliGRAM(s) Oral once  metoclopramide 10 milliGRAM(s) Oral every 8 hours PRN  ondansetron Injectable 4 milliGRAM(s) IV Push every 6 hours PRN  pantoprazole    Tablet 40 milliGRAM(s) Oral before breakfast  potassium chloride    Tablet ER 20 milliEquivalent(s) Oral once  potassium phosphate / sodium phosphate powder 1 Packet(s) Oral two times a day with meals  sodium chloride 0.9%. 1000 milliLiter(s) IV Continuous <Continuous>  traMADol 25 milliGRAM(s) Oral every 6 hours PRN      Lab      Radiology    Assessment- Brain mass     Plan as per NS

## 2018-11-01 ENCOUNTER — TRANSCRIPTION ENCOUNTER (OUTPATIENT)
Age: 23
End: 2018-11-01

## 2018-11-01 VITALS
SYSTOLIC BLOOD PRESSURE: 107 MMHG | DIASTOLIC BLOOD PRESSURE: 55 MMHG | HEART RATE: 60 BPM | OXYGEN SATURATION: 100 % | RESPIRATION RATE: 18 BRPM

## 2018-11-01 LAB
ANION GAP SERPL CALC-SCNC: 18 MMOL/L — HIGH (ref 5–17)
BUN SERPL-MCNC: 10 MG/DL — SIGNIFICANT CHANGE UP (ref 7–23)
CA-I BLD-SCNC: 1.22 MMOL/L — SIGNIFICANT CHANGE UP (ref 1.12–1.3)
CALCIUM SERPL-MCNC: 10.1 MG/DL — SIGNIFICANT CHANGE UP (ref 8.4–10.5)
CHLORIDE SERPL-SCNC: 95 MMOL/L — LOW (ref 96–108)
CO2 SERPL-SCNC: 23 MMOL/L — SIGNIFICANT CHANGE UP (ref 22–31)
CREAT SERPL-MCNC: 0.53 MG/DL — SIGNIFICANT CHANGE UP (ref 0.5–1.3)
GLUCOSE BLDC GLUCOMTR-MCNC: 107 MG/DL — HIGH (ref 70–99)
GLUCOSE BLDC GLUCOMTR-MCNC: 108 MG/DL — HIGH (ref 70–99)
GLUCOSE BLDC GLUCOMTR-MCNC: 133 MG/DL — HIGH (ref 70–99)
GLUCOSE SERPL-MCNC: 108 MG/DL — HIGH (ref 70–99)
HCT VFR BLD CALC: 29 % — LOW (ref 34.5–45)
HGB BLD-MCNC: 9.1 G/DL — LOW (ref 11.5–15.5)
MAGNESIUM SERPL-MCNC: 2 MG/DL — SIGNIFICANT CHANGE UP (ref 1.6–2.6)
MCHC RBC-ENTMCNC: 28.3 PG — SIGNIFICANT CHANGE UP (ref 27–34)
MCHC RBC-ENTMCNC: 31.4 G/DL — LOW (ref 32–36)
MCV RBC AUTO: 90.3 FL — SIGNIFICANT CHANGE UP (ref 80–100)
PHOSPHATE SERPL-MCNC: 4 MG/DL — SIGNIFICANT CHANGE UP (ref 2.5–4.5)
PLATELET # BLD AUTO: 283 K/UL — SIGNIFICANT CHANGE UP (ref 150–400)
POTASSIUM SERPL-MCNC: 3.9 MMOL/L — SIGNIFICANT CHANGE UP (ref 3.5–5.3)
POTASSIUM SERPL-SCNC: 3.9 MMOL/L — SIGNIFICANT CHANGE UP (ref 3.5–5.3)
RBC # BLD: 3.21 M/UL — LOW (ref 3.8–5.2)
RBC # FLD: 15.1 % — SIGNIFICANT CHANGE UP (ref 10.3–16.9)
SODIUM SERPL-SCNC: 136 MMOL/L — SIGNIFICANT CHANGE UP (ref 135–145)
WBC # BLD: 11.8 K/UL — HIGH (ref 3.8–10.5)
WBC # FLD AUTO: 11.8 K/UL — HIGH (ref 3.8–10.5)

## 2018-11-01 PROCEDURE — 70553 MRI BRAIN STEM W/O & W/DYE: CPT | Mod: 26

## 2018-11-01 PROCEDURE — 99231 SBSQ HOSP IP/OBS SF/LOW 25: CPT | Mod: 24

## 2018-11-01 RX ORDER — DEXAMETHASONE 0.5 MG/5ML
2 ELIXIR ORAL
Qty: 14 | Refills: 0
Start: 2018-11-01

## 2018-11-01 RX ORDER — LEVETIRACETAM 250 MG/1
1 TABLET, FILM COATED ORAL
Qty: 28 | Refills: 0
Start: 2018-11-01 | End: 2018-11-14

## 2018-11-01 RX ORDER — OXYCODONE HYDROCHLORIDE 5 MG/1
1 TABLET ORAL
Qty: 56 | Refills: 0 | OUTPATIENT
Start: 2018-11-01 | End: 2018-11-14

## 2018-11-01 RX ORDER — ISOTRETINOIN 20 MG/1
1 CAPSULE, LIQUID FILLED ORAL
Qty: 28 | Refills: 0 | OUTPATIENT
Start: 2018-11-01 | End: 2018-11-14

## 2018-11-01 RX ORDER — ISOTRETINOIN 20 MG/1
1 CAPSULE, LIQUID FILLED ORAL
Qty: 0 | Refills: 0 | COMMUNITY

## 2018-11-01 RX ORDER — ACETAMINOPHEN 500 MG
2 TABLET ORAL
Qty: 0 | Refills: 0 | DISCHARGE
Start: 2018-11-01

## 2018-11-01 RX ORDER — PANTOPRAZOLE SODIUM 20 MG/1
1 TABLET, DELAYED RELEASE ORAL
Qty: 14 | Refills: 0
Start: 2018-11-01 | End: 2018-11-14

## 2018-11-01 RX ORDER — SENNA PLUS 8.6 MG/1
2 TABLET ORAL
Qty: 0 | Refills: 0 | COMMUNITY

## 2018-11-01 RX ADMIN — Medication 1 SPRAY(S): at 01:21

## 2018-11-01 RX ADMIN — OXYCODONE HYDROCHLORIDE 15 MILLIGRAM(S): 5 TABLET ORAL at 08:45

## 2018-11-01 RX ADMIN — Medication 2 MILLIGRAM(S): at 17:16

## 2018-11-01 RX ADMIN — Medication 650 MILLIGRAM(S): at 04:47

## 2018-11-01 RX ADMIN — LEVETIRACETAM 500 MILLIGRAM(S): 250 TABLET, FILM COATED ORAL at 15:57

## 2018-11-01 RX ADMIN — Medication 650 MILLIGRAM(S): at 15:56

## 2018-11-01 RX ADMIN — Medication 4 MILLIGRAM(S): at 07:52

## 2018-11-01 RX ADMIN — Medication 650 MILLIGRAM(S): at 06:59

## 2018-11-01 RX ADMIN — OXYCODONE HYDROCHLORIDE 15 MILLIGRAM(S): 5 TABLET ORAL at 05:48

## 2018-11-01 RX ADMIN — LEVETIRACETAM 500 MILLIGRAM(S): 250 TABLET, FILM COATED ORAL at 01:21

## 2018-11-01 NOTE — PROGRESS NOTE ADULT - SUBJECTIVE AND OBJECTIVE BOX
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 This was initially discovered in 2014 and remained asymptomatic. The lesion grew in size in 2016 and continued to grow since last MRI 4/18. She continues to deny headaches, vision changes or gait changes. She is planned for left craniotomy for resection of this suspected low grade lesion on 10/29/18 (Monday) (28 Oct 2018 22:38)    OVERNIGHT EVENTS: Mother reports issues overnight with staffing/nursing, reports daughter was bradycardic and with headaches overnight, improved with pain medication.    Hospital Course:   10/29: POD#0 left craniotomy and gross total resection of brain tumor  10/30: POD#1: No acute events overnight.   10/31: POD#2: No acute events overnight. Yesterday pt had bouts of emesis and nausea, admits to nausea this monring but denies HA/V today. States pain is well-controlled.     Vital Signs Last 24 Hrs  T(C): 36.8 (01 Nov 2018 05:10), Max: 37.6 (31 Oct 2018 22:46)  T(F): 98.3 (01 Nov 2018 05:10), Max: 99.6 (31 Oct 2018 22:46)  HR: 50 (01 Nov 2018 09:06) (50 - 94)  BP: 99/60 (01 Nov 2018 09:06) (84/55 - 107/70)  BP(mean): 73 (01 Nov 2018 09:06) (63 - 81)  RR: 18 (01 Nov 2018 09:06) (14 - 18)  SpO2: 99% (01 Nov 2018 09:06) (98% - 100%)    I&O's Detail    31 Oct 2018 07:01  -  01 Nov 2018 07:00  --------------------------------------------------------  IN:    IV PiggyBack: 200 mL    Oral Fluid: 100 mL    sodium chloride 0.9%: 87.5 mL  Total IN: 387.5 mL    OUT:    Voided: 2700 mL  Total OUT: 2700 mL    Total NET: -2312.5 mL        I&O's Summary    31 Oct 2018 07:01  -  01 Nov 2018 07:00  --------------------------------------------------------  IN: 387.5 mL / OUT: 2700 mL / NET: -2312.5 mL        PHYSICAL EXAM:  Gen: NAD, AAOx3  HEENT: PERRL. EOMI. Left scalp incision C/D/I.  Neck: FROM, nontender  Lungs: Clear b/l  Heart: S1, S2. NSR.  Abd: Soft, NT/ND. +BS  Exts: Pulses 2+ throughout  Neuro: CNs II-XII intact. 5/5 str x4 extremities. Speech clear. Following commands. Gait intact.      TUBES/LINES:  [] CVC  [] A-line  [] Lumbar Drain  [] Ventriculostomy  [] Other    DIET:  [] NPO  [] Mechanical  [] Tube feeds    LABS:                        9.1    11.8  )-----------( 283      ( 01 Nov 2018 05:46 )             29.0     11-01    136  |  95<L>  |  10  ----------------------------<  108<H>  3.9   |  23  |  0.53    Ca    10.1      01 Nov 2018 05:45  Phos  4.0     11-01  Mg     2.0     11-01              CAPILLARY BLOOD GLUCOSE      POCT Blood Glucose.: 108 mg/dL (01 Nov 2018 06:39)  POCT Blood Glucose.: 119 mg/dL (31 Oct 2018 21:36)  POCT Blood Glucose.: 122 mg/dL (31 Oct 2018 17:18)      Drug Levels: [] N/A    CSF Analysis: [] N/A      Allergies    adhesives (Rash)  penicillin (Rash)  sulfa drugs (Rash)    Intolerances      MEDICATIONS:  Antibiotics:    Neuro:  acetaminophen   Tablet .. 650 milliGRAM(s) Oral every 6 hours PRN  levETIRAcetam 500 milliGRAM(s) Oral every 12 hours  ondansetron Injectable 4 milliGRAM(s) IV Push every 6 hours PRN  oxyCODONE    IR 15 milliGRAM(s) Oral every 6 hours PRN  oxyCODONE    IR 30 milliGRAM(s) Oral every 6 hours PRN    Anticoagulation:    OTHER:  dexamethasone     Tablet   Oral   dexamethasone     Tablet 2 milliGRAM(s) Oral every 12 hours  dextrose 40% Gel 15 Gram(s) Oral once PRN  dextrose 50% Injectable 12.5 Gram(s) IV Push once  dextrose 50% Injectable 25 Gram(s) IV Push once  dextrose 50% Injectable 25 Gram(s) IV Push once  glucagon  Injectable 1 milliGRAM(s) IntraMuscular once PRN  influenza   Vaccine 0.5 milliLiter(s) IntraMuscular once  insulin lispro (HumaLOG) corrective regimen sliding scale   SubCutaneous Before meals and at bedtime  metoclopramide 10 milliGRAM(s) Oral every 8 hours PRN  pantoprazole    Tablet 40 milliGRAM(s) Oral before breakfast  sodium chloride 0.65% Nasal 1 Spray(s) Both Nostrils every 3 hours PRN    IVF:  dextrose 5%. 1000 milliLiter(s) IV Continuous <Continuous>    CULTURES:    RADIOLOGY & ADDITIONAL TESTS:   < from: MR Head w/wo IV Cont (11.01.18 @ 09:05) >  Small focus of FLAIR/T2 signal abnormality along the superior   and anterior margin of the resection cavity which may represent residual   nonenhancing tumor versus post operative changes. On future follow-up,   3-D FLAIR images are recommended to further assess the signal abnormality.          ASSESSMENT:  23y Female with no significant PMH is POD#3 from left craniotomy for gross total resection of brain tumor.  Frozen: Low grade glioma         PLAN:  NEURO:  Continue pain meds PRN and neuro checks,  MRI Brain performed this morning, pending review w/ Dr. Gardiner  Decadron taper PO,  Keppra for seizure prophylaxis.    CARDIOVASCULAR:  Normotensive BP goals    PULMONARY:  Satting well on RA, IS as tolerated    RENAL:  Voiding    GI:  PO diet as tolerated, PPI,  Anti-emetics as ordered,    ID:  Afebrile    ENDO:  ISS during steroid use    DVT PROPHYLAXIS:  SCDs, encourage OOB    DISPOSITION:   Pending discharge home today,  D/w Dr. Gardiner,

## 2018-11-01 NOTE — PROGRESS NOTE ADULT - REASON FOR ADMISSION
brain tumor

## 2018-11-01 NOTE — PROGRESS NOTE ADULT - SUBJECTIVE AND OBJECTIVE BOX
Pt's mother was concerned for SBP 84, HR 73, although patient was denying having any symptoms such as lightheadedness, dizziness, chest pain, shortness of breath.  I spent 45 minutes counseling/explaining to the mother of the patient that if patient is not exhibiting any symptoms, and urine output is adequate, IV fluids Bolus is not indicated.  Pt's mother did not agree with me and I spent an additional 30 minutes attempting to explain my reasoning in regards to IV fluids bolus.  Pt's mother also reports that daughter's HR is less than 60, although the patient is denying sob, cp, n/v, dizziness or lightheadedness. I explained to the mother of the patient that when the pt falls asleep it is normal for the heart rate to decrease as long as the patient is not having any complaints and vital signs are stable as well as neuro exam is unchanged.  Pt's mother did not agree with me on this matter as well.  Pt's mother also requested to have an US of the nasal cavity for the reason of having feeling as if " something is moving in the nose".  I explained to the patient's mother that after my careful examination, I did not note any urgent concerns and MRI post-op of the brain should reveal more details once it's completed.  I called MRI tech and asked to expedite the MRI so that we could examine the nasal cavity of the patient.  I offered nasal spray for now and patient agreed to try.  Pt's mother seemed very upset with my explanations.  Pt's mother was also asking for the Mg and K repletion to be given at 2AM and I explained to the mother that we only replete electrolytes if the levels are low and there repletions were done earlier in the day. Pt's mother was concerned for SBP 84, HR 73, although patient was denying having any symptoms such as lightheadedness, dizziness, chest pain, shortness of breath.  I spent 45 minutes counseling/explaining to the mother of the patient that if patient is not exhibiting any symptoms, and urine output is adequate, IV fluids Bolus is not indicated.  Pt's mother did not agree with me and I spent an additional 30 minutes attempting to explain my reasoning in regards to IV fluids bolus.  Pt's mother also reports that daughter's HR is less than 60, although the patient is denying sob, cp, n/v, dizziness or lightheadedness. I explained to the mother of the patient that when the pt falls asleep it is normal for the heart rate to decrease as long as the patient is not having any complaints and vital signs are stable as well as neuro exam is unchanged.  Pt's mother did not agree with me on this matter as well.  Pt's mother also requested to have an US of the nasal cavity for the reason of having feeling as if " something is moving in the nose".  I explained to the patient's mother that after my careful examination, I did not note any urgent concerns and MRI post-op of the brain should reveal more details once it's completed.  I called MRI tech and asked to expedite the MRI so that we could examine the nasal cavity of the patient.  I offered nasal spray for now and patient agreed to try.  Pt's mother seemed very upset with my explanations.  Pt's mother was also asking for the Mg and K repletion to be given at 2AM and I explained to the mother that we only replete electrolytes if the levels are low and there repletions were done earlier in the day.  Also, of note, patient's mother refused water offered to her daughter stating " the water was poisoned by you all".

## 2018-11-01 NOTE — DISCHARGE NOTE ADULT - CARE PLAN
Principal Discharge DX:	Brain lesion  Goal:	maintain normal activities of daily living  Assessment and plan of treatment:	24 yo female s/p left craniotomy for resection of tumor

## 2018-11-01 NOTE — DISCHARGE NOTE ADULT - NS AS ACTIVITY OBS
Do not drive or operate machinery/Stairs allowed/No Heavy lifting/straining/Walking-Indoors allowed/Do not make important decisions/Walking-Outdoors allowed/Showering allowed

## 2018-11-01 NOTE — PROGRESS NOTE ADULT - SUBJECTIVE AND OBJECTIVE BOX
=================================  NEUROCRITICAL CARE ATTENDING NOTE  =================================    WARD REYES   MRN-[id number]  Summary:  23y/F with no PMH, known L parietal brain tumor since  (incidentaloma), on follow-up imaging, noted to be increasing in size.  Admitted 10/28 for elective resection of mass.  Overnight Events: s/p resection, complaining of headache, panic attack   REVIEW OF SYSTEMS:  No headaches, no nausea or vomiting; 14 -point review of systems otherwise unremarkable.    Past Medical History: Brain lesion  No pertinent past medical history  Allergies:  adhesives (Rash) penicillin (Rash) sulfa drugs (Rash)   Home meds:  Accutane 10 mg oral capsule: 1 cap(s) orally 2 times a day    PHYSICAL EXAMINATION  T(C): 36.8 ( @ 05:10), Max: 37.6 (10-31 @ 22:46) HR: 58 ( @ 12:02) (50 - 94) BP: 92/55 ( @ 12:02) (84/55 - 107/70) RR: 18 ( @ 12:02) (14 - 18) SpO2: 97% ( @ 12:02) (97% - 100%)   NEUROLOGIC EXAMINATION:  Patient is awake, alert, fully oriented, pupils 3mm equal and reactive to light, EOMs intact, no facial droop, moving all 4s  GENERAL:  not intubated, not in cardiorespiratory distress  EENT: anicteric  CARDIOVASC:  (+) S1 S2, normal rate and regular rhythm  PULMONARY:  clear to auscultation bilaterally  ABDOMEN:  soft, nontender, with normoactive bowel sounds  EXTREMITIES:  no edema  SKIN:  no rash     LABS:  CAPILLARY BLOOD GLUCOSE 133 108 119 122                        9.1   (8.1)  11.8  )-----------( 283      ( 2018 05:46 )             29.0     136  |  95<L>  |  10  ----------------------------<  108<H>  3.9   |  23  |  0.53    Ca    10.1      2018 05:45  Phos  4.0       Mg     2.0     11-01    10-31 @ 07:01  -   @ 07:00  IN: 387.5 mL / OUT: 2700 mL / NET: -2312.5 mL    HbA1C = 4.8 (10-30)    Bacteriology:  CSF studies:  EEG:  Neuroimagin/01 MRI:  ?residual nonenhancing tumor vs post-op changes, L parietal  10/29 MRI: L parietal mass, likely low grade neoplasm  Other imaging:  10/30 LE Doppler NEG    MEDICATIONS: dexamethasone 2mg PO q12h mod ISS levetiracetam 500 PO q12h metoclopramide 10mg PO q8h PRN oxycodone 15/30 PRN pantoprazole 40 nasal spray    IV FLUIDS: NS@50cc/hr  DRIPS:  DIET: regular  Lines:   Drains:     Wounds:    CODE STATUS:  Full Code                       GOALS OF CARE:  aggressive                      DISPOSITION:  [facility]

## 2018-11-01 NOTE — DISCHARGE NOTE ADULT - MEDICATION SUMMARY - MEDICATIONS TO TAKE
I will START or STAY ON the medications listed below when I get home from the hospital:    dexamethasone 1 mg oral tablet  -- 2mg by mouth 2 times a day x2 days, then 2mg by mouth once a day x2 days, then 1mg by mouth once a day x1day  -- It is very important that you take or use this exactly as directed.  Do not skip doses or discontinue unless directed by your doctor.  Obtain medical advice before taking any non-prescription drugs as some may affect the action of this medication.  Take with food or milk.    -- Indication: For BRAIN LESION    acetaminophen 325 mg oral tablet  -- 2 tab(s) by mouth every 6 hours, As needed, Mild Pain (1 - 3)  -- Indication: For pain    levETIRAcetam 500 mg oral tablet  -- 1 tab(s) by mouth every 12 hours  -- Indication: For antiseizure    Accutane 10 mg oral capsule  -- 1 cap(s) by mouth 2 times a day  -- Indication: For antineoplastic    pantoprazole 40 mg oral delayed release tablet  -- 1 tab(s) by mouth once a day (before a meal)  -- Indication: For GI ppx

## 2018-11-01 NOTE — PROGRESS NOTE ADULT - ASSESSMENT
23y/F with L parietal mass, brain compression, cerebral edema, s/p L stereotactic crani, resection of brain tumor (10/29/2018, Dr. Gardiner, Dr. Bowden) Frozen: (low grade glioma)    PLAN:   NEURO: neurochecks q4h, PRN pain meds with Tylenol / decrease oxycodone dose to 5/10  s/p resection of brain mass:  steroid taper as per neurosurgery; f/u final histopathology, MRI done   seizure prophylaxis: levetiracetam 500mg PO BID  REHAB:  physical therapy evaluation and management    EARLY MOB:  HOB up OOB ambulate    PULM:  room air, incentive spirometry  CARDIO:  SBP goal 100-150mm Hg  ENDO:  Blood sugar goals 140-180 mg/dL, continue insulin sliding scale  GI:  PPI for GI prophylaxis while on steroids  DIET: regular diet   RENAL:  IVL  HEM/ONC: Hb stable  VTE Prophylaxis: SCDs, SQH; baseline doppler NEG  ID: afebrile, no leukocytosis  Social: will update family     ATTENDING ATTESTATION:  I was physically present for the key portions of the evaluation and management (E/M) service provided.  I agree with the above history, physical and plan which I have reviewed and edited where appropriate.     Patient not at high risk for neurologic deterioration / death.  Time spent on this noncritically ill patient: 45 minutes spent on total encounter, more than 50% of the visit was spent counseling and/or coordinating care by the attending physician.    Plan discussed with RN, house staff.

## 2018-11-01 NOTE — PROGRESS NOTE ADULT - SUBJECTIVE AND OBJECTIVE BOX
Neurology Follow up note    Name  PHYLICIA DYER    HPI:  Phylicia Dyer is a 23-year-old right handed female with history of incidently found left parietal brain tumor found on MRI imaging. This was initially discovered in 2014 and remained asymptomatic. The lesion grew in size in 2016 and continued to grow since last MRI 4/18. She continues to deny headaches, vision changes or gait changes. She is planned for left craniotomy for resection of this suspected low grade lesion on 10/29/18 (Monday) (28 Oct 2018 22:38)      Interval History - no new headaches - reports sound in the head and discomfort in the nose         REVIEW OF SYSTEMS    Vital Signs Last 24 Hrs  T(C): 36.8 (01 Nov 2018 05:10), Max: 37.6 (31 Oct 2018 08:56)  T(F): 98.3 (01 Nov 2018 05:10), Max: 99.7 (31 Oct 2018 08:56)  HR: 94 (01 Nov 2018 04:44) (52 - 94)  BP: 92/50 (01 Nov 2018 04:44) (84/55 - 115/70)  BP(mean): 66 (01 Nov 2018 04:44) (63 - 85)  RR: 16 (01 Nov 2018 00:45) (12 - 20)  SpO2: 99% (01 Nov 2018 04:44) (98% - 100%)    Physical Exam-     Mental Status- awake and alert    Cranial Nerves- full EOM    Gait and station- no foot drop    Motor- no focal weakness    Reflexes- decreased    Sensation- no sensory level    Coordination- no tremors    Vascular - no bruits    Medications  acetaminophen   Tablet .. 650 milliGRAM(s) Oral every 6 hours PRN  dexamethasone     Tablet   Oral   dexamethasone     Tablet 2 milliGRAM(s) Oral every 12 hours  dextrose 40% Gel 15 Gram(s) Oral once PRN  dextrose 5%. 1000 milliLiter(s) IV Continuous <Continuous>  dextrose 50% Injectable 12.5 Gram(s) IV Push once  dextrose 50% Injectable 25 Gram(s) IV Push once  dextrose 50% Injectable 25 Gram(s) IV Push once  glucagon  Injectable 1 milliGRAM(s) IntraMuscular once PRN  influenza   Vaccine 0.5 milliLiter(s) IntraMuscular once  insulin lispro (HumaLOG) corrective regimen sliding scale   SubCutaneous Before meals and at bedtime  levETIRAcetam 500 milliGRAM(s) Oral every 12 hours  metoclopramide 10 milliGRAM(s) Oral every 8 hours PRN  ondansetron Injectable 4 milliGRAM(s) IV Push every 6 hours PRN  oxyCODONE    IR 15 milliGRAM(s) Oral every 6 hours PRN  oxyCODONE    IR 30 milliGRAM(s) Oral every 6 hours PRN  pantoprazole    Tablet 40 milliGRAM(s) Oral before breakfast  sodium chloride 0.65% Nasal 1 Spray(s) Both Nostrils every 3 hours PRN      Lab      Radiology    Assessment- Brain tumor    Plan as per Dr Gardiner

## 2018-11-01 NOTE — DISCHARGE NOTE ADULT - ADDITIONAL INSTRUCTIONS
1. You may wash your hair 3 days after surgery.  The staples can get wet, pat dry.  2. Mild swelling around the incision is common. Keep incision open to air and dry.  3. Call our office at [phone] to set up the appointment with an NP for wound check  once you get home.  4. Inform the doctor immediately if you have a fever (above 101), chills, night  sweats, wound drainage, increasing wound redness or pain, nausea, vomiting, or  worsening headache.  B. ACTIVITY LEVEL  1. Fatigue is common following brain surgery, rest if you are tired.  2. You should get up and walk around every hour during the daytime.  3. No bending, lifting or twisting for the first 3 weeks, but walking is  recommended.  4. Drink plenty of water, stay out of the sun.  You may be given a narcotic pain reliever such as Percocet  (oxycodone-acetaminophen). This is for short term use. Avoid use of alcohol when taking these meds.

## 2018-11-01 NOTE — PROGRESS NOTE ADULT - PROVIDER SPECIALTY LIST ADULT
NSICU
Neurology
Neurology
Neurosurgery
NSICU

## 2018-11-01 NOTE — DISCHARGE NOTE ADULT - HOSPITAL COURSE
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 This was initially discovered in 2014 and remained asymptomatic. The lesion grew in size in 2016 and continued to grow since last MRI 4/18. She continues to deny headaches, vision changes or gait changes.     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 No complications intraop. Post op patient transferred to SICU for post op monitoring. Patient transferred to stepdown unit on 10/31. Post op MRI performed on 11/1. Patient worked with PT/OT and is recommended for discharge home with no needs. Patient medically stable for discharge home.

## 2018-11-02 LAB — SURGICAL PATHOLOGY STUDY: SIGNIFICANT CHANGE UP

## 2018-11-02 PROCEDURE — 83735 ASSAY OF MAGNESIUM: CPT

## 2018-11-02 PROCEDURE — 84132 ASSAY OF SERUM POTASSIUM: CPT

## 2018-11-02 PROCEDURE — 80048 BASIC METABOLIC PNL TOTAL CA: CPT

## 2018-11-02 PROCEDURE — C1713: CPT

## 2018-11-02 PROCEDURE — 84100 ASSAY OF PHOSPHORUS: CPT

## 2018-11-02 PROCEDURE — 85027 COMPLETE CBC AUTOMATED: CPT

## 2018-11-02 PROCEDURE — 70551 MRI BRAIN STEM W/O DYE: CPT

## 2018-11-02 PROCEDURE — 97161 PT EVAL LOW COMPLEX 20 MIN: CPT

## 2018-11-02 PROCEDURE — 85018 HEMOGLOBIN: CPT

## 2018-11-02 PROCEDURE — C1889: CPT

## 2018-11-02 PROCEDURE — A9585: CPT

## 2018-11-02 PROCEDURE — 82330 ASSAY OF CALCIUM: CPT

## 2018-11-02 PROCEDURE — 86901 BLOOD TYPING SEROLOGIC RH(D): CPT

## 2018-11-02 PROCEDURE — 97116 GAIT TRAINING THERAPY: CPT

## 2018-11-02 PROCEDURE — 82962 GLUCOSE BLOOD TEST: CPT

## 2018-11-02 PROCEDURE — 93970 EXTREMITY STUDY: CPT

## 2018-11-02 PROCEDURE — 36415 COLL VENOUS BLD VENIPUNCTURE: CPT

## 2018-11-02 PROCEDURE — 86850 RBC ANTIBODY SCREEN: CPT

## 2018-11-02 PROCEDURE — 84295 ASSAY OF SERUM SODIUM: CPT

## 2018-11-02 PROCEDURE — 88342 IMHCHEM/IMCYTCHM 1ST ANTB: CPT

## 2018-11-02 PROCEDURE — 70553 MRI BRAIN STEM W/O & W/DYE: CPT

## 2018-11-02 PROCEDURE — 86900 BLOOD TYPING SEROLOGIC ABO: CPT

## 2018-11-02 PROCEDURE — 88333 PATH CONSLTJ SURG CYTO XM 1: CPT

## 2018-11-02 PROCEDURE — 88341 IMHCHEM/IMCYTCHM EA ADD ANTB: CPT

## 2018-11-02 PROCEDURE — 88307 TISSUE EXAM BY PATHOLOGIST: CPT

## 2018-11-02 PROCEDURE — 88360 TUMOR IMMUNOHISTOCHEM/MANUAL: CPT

## 2018-11-02 PROCEDURE — 83036 HEMOGLOBIN GLYCOSYLATED A1C: CPT

## 2018-11-02 PROCEDURE — 88331 PATH CONSLTJ SURG 1 BLK 1SPC: CPT

## 2018-11-02 RX ORDER — ISOTRETINOIN 20 MG/1
1 CAPSULE, LIQUID FILLED ORAL
Qty: 28 | Refills: 0
Start: 2018-11-02 | End: 2018-11-15

## 2018-11-06 DIAGNOSIS — G93.5 COMPRESSION OF BRAIN: ICD-10-CM

## 2018-11-06 DIAGNOSIS — G93.6 CEREBRAL EDEMA: ICD-10-CM

## 2018-11-06 DIAGNOSIS — C71.9 MALIGNANT NEOPLASM OF BRAIN, UNSPECIFIED: ICD-10-CM

## 2018-11-06 DIAGNOSIS — C71.3 MALIGNANT NEOPLASM OF PARIETAL LOBE: ICD-10-CM

## 2018-11-09 ENCOUNTER — APPOINTMENT (OUTPATIENT)
Dept: NEUROSURGERY | Facility: CLINIC | Age: 23
End: 2018-11-09
Payer: COMMERCIAL

## 2018-11-09 VITALS
HEART RATE: 90 BPM | OXYGEN SATURATION: 100 % | TEMPERATURE: 98.6 F | RESPIRATION RATE: 18 BRPM | WEIGHT: 114 LBS | BODY MASS INDEX: 17.89 KG/M2 | DIASTOLIC BLOOD PRESSURE: 70 MMHG | HEIGHT: 67 IN | SYSTOLIC BLOOD PRESSURE: 112 MMHG

## 2018-11-09 PROBLEM — G93.9 DISORDER OF BRAIN, UNSPECIFIED: Chronic | Status: ACTIVE | Noted: 2018-10-29

## 2018-11-09 PROCEDURE — 99024 POSTOP FOLLOW-UP VISIT: CPT

## 2018-11-19 ENCOUNTER — APPOINTMENT (OUTPATIENT)
Dept: NEUROSURGERY | Facility: CLINIC | Age: 23
End: 2018-11-19
Payer: COMMERCIAL

## 2018-11-19 VITALS
OXYGEN SATURATION: 99 % | WEIGHT: 114 LBS | SYSTOLIC BLOOD PRESSURE: 106 MMHG | RESPIRATION RATE: 18 BRPM | HEIGHT: 67 IN | DIASTOLIC BLOOD PRESSURE: 70 MMHG | HEART RATE: 80 BPM | TEMPERATURE: 97.5 F | BODY MASS INDEX: 17.89 KG/M2

## 2018-11-19 DIAGNOSIS — G93.9 DISORDER OF BRAIN, UNSPECIFIED: ICD-10-CM

## 2018-11-19 PROCEDURE — 99024 POSTOP FOLLOW-UP VISIT: CPT

## 2018-11-20 ENCOUNTER — APPOINTMENT (OUTPATIENT)
Dept: NEUROLOGY | Facility: CLINIC | Age: 23
End: 2018-11-20
Payer: COMMERCIAL

## 2018-11-20 PROCEDURE — 95816 EEG AWAKE AND DROWSY: CPT

## 2018-11-28 ENCOUNTER — APPOINTMENT (OUTPATIENT)
Dept: NEUROLOGY | Facility: CLINIC | Age: 23
End: 2018-11-28
Payer: COMMERCIAL

## 2018-11-28 VITALS
HEIGHT: 67 IN | SYSTOLIC BLOOD PRESSURE: 106 MMHG | BODY MASS INDEX: 17.89 KG/M2 | WEIGHT: 114 LBS | HEART RATE: 78 BPM | DIASTOLIC BLOOD PRESSURE: 72 MMHG | OXYGEN SATURATION: 100 %

## 2018-11-28 PROCEDURE — 99204 OFFICE O/P NEW MOD 45 MIN: CPT

## 2018-11-28 RX ORDER — ISOTRETINOIN 30 MG/1
CAPSULE, GELATIN COATED ORAL
Refills: 0 | Status: DISCONTINUED | COMMUNITY
End: 2018-11-28

## 2018-11-28 RX ORDER — LEVETIRACETAM 500 MG/1
500 TABLET, FILM COATED ORAL
Refills: 0 | Status: DISCONTINUED | COMMUNITY
End: 2018-11-28

## 2018-11-29 ENCOUNTER — APPOINTMENT (OUTPATIENT)
Dept: NEUROSURGERY | Facility: CLINIC | Age: 23
End: 2018-11-29

## 2018-12-08 ENCOUNTER — EMERGENCY (EMERGENCY)
Facility: HOSPITAL | Age: 23
LOS: 1 days | Discharge: HOME | End: 2018-12-08
Attending: EMERGENCY MEDICINE | Admitting: EMERGENCY MEDICINE
Payer: COMMERCIAL

## 2018-12-08 VITALS
SYSTOLIC BLOOD PRESSURE: 123 MMHG | DIASTOLIC BLOOD PRESSURE: 68 MMHG | HEIGHT: 67 IN | RESPIRATION RATE: 18 BRPM | HEART RATE: 70 BPM | WEIGHT: 115.08 LBS | OXYGEN SATURATION: 100 % | TEMPERATURE: 98 F

## 2018-12-08 VITALS
SYSTOLIC BLOOD PRESSURE: 110 MMHG | OXYGEN SATURATION: 99 % | HEART RATE: 68 BPM | DIASTOLIC BLOOD PRESSURE: 65 MMHG | RESPIRATION RATE: 16 BRPM

## 2018-12-08 DIAGNOSIS — Z98.890 OTHER SPECIFIED POSTPROCEDURAL STATES: Chronic | ICD-10-CM

## 2018-12-08 LAB
ALBUMIN SERPL ELPH-MCNC: 4.6 G/DL — SIGNIFICANT CHANGE UP (ref 3.3–5)
ALP SERPL-CCNC: 72 U/L — SIGNIFICANT CHANGE UP (ref 40–120)
ALT FLD-CCNC: 8 U/L — LOW (ref 10–45)
ANION GAP SERPL CALC-SCNC: 16 MMOL/L — SIGNIFICANT CHANGE UP (ref 5–17)
APTT BLD: 33.7 SEC — SIGNIFICANT CHANGE UP (ref 27.5–36.3)
AST SERPL-CCNC: 12 U/L — SIGNIFICANT CHANGE UP (ref 10–40)
BASOPHILS NFR BLD AUTO: 0.8 % — SIGNIFICANT CHANGE UP (ref 0–2)
BILIRUB SERPL-MCNC: 0.2 MG/DL — SIGNIFICANT CHANGE UP (ref 0.2–1.2)
BUN SERPL-MCNC: 16 MG/DL — SIGNIFICANT CHANGE UP (ref 7–23)
CALCIUM SERPL-MCNC: 9.2 MG/DL — SIGNIFICANT CHANGE UP (ref 8.4–10.5)
CHLORIDE SERPL-SCNC: 101 MMOL/L — SIGNIFICANT CHANGE UP (ref 96–108)
CO2 SERPL-SCNC: 23 MMOL/L — SIGNIFICANT CHANGE UP (ref 22–31)
CREAT SERPL-MCNC: 0.7 MG/DL — SIGNIFICANT CHANGE UP (ref 0.5–1.3)
EOSINOPHIL NFR BLD AUTO: 2.3 % — SIGNIFICANT CHANGE UP (ref 0–6)
GLUCOSE SERPL-MCNC: 75 MG/DL — SIGNIFICANT CHANGE UP (ref 70–99)
HCG SERPL-ACNC: <.1 MIU/ML — SIGNIFICANT CHANGE UP
HCT VFR BLD CALC: 32.1 % — LOW (ref 34.5–45)
HGB BLD-MCNC: 10.1 G/DL — LOW (ref 11.5–15.5)
INR BLD: 1.19 — HIGH (ref 0.88–1.16)
LYMPHOCYTES # BLD AUTO: 37.2 % — SIGNIFICANT CHANGE UP (ref 13–44)
MAGNESIUM SERPL-MCNC: 2 MG/DL — SIGNIFICANT CHANGE UP (ref 1.6–2.6)
MCHC RBC-ENTMCNC: 28.4 PG — SIGNIFICANT CHANGE UP (ref 27–34)
MCHC RBC-ENTMCNC: 31.5 G/DL — LOW (ref 32–36)
MCV RBC AUTO: 90.2 FL — SIGNIFICANT CHANGE UP (ref 80–100)
MONOCYTES NFR BLD AUTO: 7.5 % — SIGNIFICANT CHANGE UP (ref 2–14)
NEUTROPHILS NFR BLD AUTO: 52.2 % — SIGNIFICANT CHANGE UP (ref 43–77)
PLATELET # BLD AUTO: 337 K/UL — SIGNIFICANT CHANGE UP (ref 150–400)
POTASSIUM SERPL-MCNC: 3.5 MMOL/L — SIGNIFICANT CHANGE UP (ref 3.5–5.3)
POTASSIUM SERPL-SCNC: 3.5 MMOL/L — SIGNIFICANT CHANGE UP (ref 3.5–5.3)
PROT SERPL-MCNC: 7.3 G/DL — SIGNIFICANT CHANGE UP (ref 6–8.3)
PROTHROM AB SERPL-ACNC: 13.5 SEC — HIGH (ref 10–12.9)
RBC # BLD: 3.56 M/UL — LOW (ref 3.8–5.2)
RBC # FLD: 15.5 % — SIGNIFICANT CHANGE UP (ref 10.3–16.9)
SODIUM SERPL-SCNC: 140 MMOL/L — SIGNIFICANT CHANGE UP (ref 135–145)
WBC # BLD: 7.8 K/UL — SIGNIFICANT CHANGE UP (ref 3.8–10.5)
WBC # FLD AUTO: 7.8 K/UL — SIGNIFICANT CHANGE UP (ref 3.8–10.5)

## 2018-12-08 PROCEDURE — 99284 EMERGENCY DEPT VISIT MOD MDM: CPT | Mod: 25

## 2018-12-08 PROCEDURE — 96360 HYDRATION IV INFUSION INIT: CPT

## 2018-12-08 PROCEDURE — 85610 PROTHROMBIN TIME: CPT

## 2018-12-08 PROCEDURE — 70450 CT HEAD/BRAIN W/O DYE: CPT

## 2018-12-08 PROCEDURE — 82962 GLUCOSE BLOOD TEST: CPT

## 2018-12-08 PROCEDURE — 80053 COMPREHEN METABOLIC PANEL: CPT

## 2018-12-08 PROCEDURE — 85730 THROMBOPLASTIN TIME PARTIAL: CPT

## 2018-12-08 PROCEDURE — 84702 CHORIONIC GONADOTROPIN TEST: CPT

## 2018-12-08 PROCEDURE — 85025 COMPLETE CBC W/AUTO DIFF WBC: CPT

## 2018-12-08 PROCEDURE — 36415 COLL VENOUS BLD VENIPUNCTURE: CPT

## 2018-12-08 PROCEDURE — 83735 ASSAY OF MAGNESIUM: CPT

## 2018-12-08 PROCEDURE — 70450 CT HEAD/BRAIN W/O DYE: CPT | Mod: 26

## 2018-12-08 RX ORDER — SODIUM CHLORIDE 9 MG/ML
1000 INJECTION INTRAMUSCULAR; INTRAVENOUS; SUBCUTANEOUS ONCE
Qty: 0 | Refills: 0 | Status: COMPLETED | OUTPATIENT
Start: 2018-12-08 | End: 2018-12-08

## 2018-12-08 RX ADMIN — SODIUM CHLORIDE 1000 MILLILITER(S): 9 INJECTION INTRAMUSCULAR; INTRAVENOUS; SUBCUTANEOUS at 03:06

## 2018-12-08 RX ADMIN — SODIUM CHLORIDE 1000 MILLILITER(S): 9 INJECTION INTRAMUSCULAR; INTRAVENOUS; SUBCUTANEOUS at 04:05

## 2018-12-08 NOTE — ED ADULT NURSE NOTE - CHIEF COMPLAINT QUOTE
seizures or syncope about 35 minutes ago witnessed by her boyfriend, pt had brain sx 10/29/18 here [facility]

## 2018-12-08 NOTE — ED PROVIDER NOTE - MEDICAL DECISION MAKING DETAILS
near syncopal episode, no focal neuro deficits, no chest pain, no SOB, no tachycardia, no tachypnea, no hypoxia, doubt ACS, doubt PE, doubt SAH  -check labs, ekg, ivf, CT head, NSG consulted

## 2018-12-08 NOTE — CONSULT NOTE ADULT - SUBJECTIVE AND OBJECTIVE BOX
HISTORY OF PRESENT ILLNESS:   24 yo F with hx of meningioma s/p resection on 10/29 with Dr. Gardiner presents to ED after an episode of lightheadedness and near syncope in a club. As per patient, clubroom was warm, crowded, and had high volume of music, she felt anxious since she went out first time after surgery. Patient's boyfriend held her and prevented her from falling. Denies visual symptoms, twitching, numbness, weakness, HA, loss of consciousness, repeated similar episodes or any other symptoms, she sates that she was not drinking alcohol at the time. Patient was on prophylactic Keppra, which was discontinued 2 wks post surgery. Her staples were also removed. Patient never had any seizure episodes. Patient feels normal at the time of interview.     PAST MEDICAL & SURGICAL HISTORY:  Glioma of brain  Brain lesion  History of craniotomy  SOCIAL HISTORY:  Tobacco Use: none   EtOH use:  none   Substance:    Allergies    adhesives (Rash)  penicillin (Rash)  sulfa drugs (Rash)    Intolerances        REVIEW OF SYSTEMS  all other ros negative except what's mentioned above.     Vital Signs Last 24 Hrs  T(C): 36.6 (08 Dec 2018 02:02), Max: 36.6 (08 Dec 2018 02:02)  T(F): 97.9 (08 Dec 2018 02:02), Max: 97.9 (08 Dec 2018 02:02)  HR: 70 (08 Dec 2018 02:02) (70 - 70)  BP: 123/68 (08 Dec 2018 02:02) (123/68 - 123/68)  BP(mean): --  RR: 18 (08 Dec 2018 02:02) (18 - 18)        PHYSICAL EXAM:  Constitutional: NAD, well groomed, well nourished  Respiratory: breathing non-labored, symmetrical chest wall movement  Cardiovascuar: RRR, no murmurs  Gastrointestinal: abdomen soft, non tender  Genitourinary: exam defferred  Neurological:  AAOX3. Verbal function intact  Cranial Nerves: II-XII intact; VF and VA intact, no nystagmus.   Motor: 5/5 power in b/l UE and LE  Sensation: intact to touch in al extremities  Pronator Drift: none   incision: well healed craniotomy incision site.     SpO2: 100% (08 Dec 2018 02:02) (100% - 100%)        LABS:                        10.1   7.8   )-----------( 337      ( 08 Dec 2018 02:42 )             32.1     12-08    140  |  101  |  16  ----------------------------<  75  3.5   |  23  |  0.70    Ca    9.2      08 Dec 2018 02:42  Mg     2.0     12-08    TPro  7.3  /  Alb  4.6  /  TBili  0.2  /  DBili  x   /  AST  12  /  ALT  8<L>  /  AlkPhos  72  12-08    PT/INR - ( 08 Dec 2018 03:21 )   PT: 13.5 sec;   INR: 1.19          PTT - ( 08 Dec 2018 03:21 )  PTT:33.7 sec    CULTURES:      RADIOLOGY & ADDITIONAL STUDIES:  < from: CT Head No Cont (12.08.18 @ 02:59) >  IMPRESSION:  No hydrocephalus, midline shift, acute intracranial hemorrhage or   demarcated territorial infarct. Prior left parieto-occipital craniotomy   for resection of the patient's left parietal lesion.    < end of copied text >    Assessment: 24 yo F with hx of meningioma, s/p rxn (10/29) now with vasovagal episode.     Plan:  - patient's vital sign is stable and neuro exam intact   - labwork shows no abnormality   - near syncopal episode is most likely vasovagal   - HCT shows no new infarct or bleed  - please f/u with Dr. Gardiner as outpatient within a week     d/w Akhil Mosher (resident) and Dr. oliver

## 2018-12-08 NOTE — ED PROVIDER NOTE - CARE PROVIDER_API CALL
Jermaine Gardiner), Neurological Surgery  [address], [place] [facility]  Phone: [phone]  Fax: [phone]

## 2018-12-08 NOTE — ED PROVIDER NOTE - PROGRESS NOTE DETAILS
seen by NSG - no further intervention at this time, do not recommend restarting keppra. pt can f/u with Dr. Gardiner as outpt.  pt tolerating po, no dizziness, steady gait, no neuro deficits.  I have discussed the discharge plan with the patient. The patient agrees with the plan, as discussed.  The patient understands Emergency Department diagnosis is a preliminary diagnosis often based on limited information and that the patient must adhere to the follow-up plan as discussed.  The patient understands that if the symptoms worsen the patient may return to the Emergency Department at any time for further evaluation and treatment.

## 2018-12-08 NOTE — ED PROVIDER NOTE - OBJECTIVE STATEMENT
23F hx recently resected L parietal glioma, c/o near syncopal episode. pt states she was in a club and suddenly felt very hot and lightheaded.  states felt like she was going to pass out.  her boyfriend caught her.  no head injury. no vomiting, no nausea.  no chest pain, no SOB.  denies alcohol or drug use.  no focal numbness/weakness currently.  pt states she was on keppra for a week after her surgery, however had normal EEG and it was discontinued.  had occipital HA earlier in the day.  no vision changes. no limb shaking, no incontinence, no tongue biting, no LOC.

## 2018-12-08 NOTE — ED PROVIDER NOTE - NSFOLLOWUPINSTRUCTIONS_ED_ALL_ED_FT
Syncope    Syncope is when you temporarily lose consciousness, also called fainting or passing out. It is caused by a sudden decrease in blood flow to the brain. Even though most causes of syncope are not dangerous, syncope can possibly be a sign of a serious medical problem. Signs that you may be about to faint include feeling dizzy, lightheaded, nausea, visual changes, or cold/clammy skin. Do not drive, operate heavy machinery, or play sports until your health care provider says it is okay.    SEEK IMMEDIATE MEDICAL CARE IF YOU HAVE ANY OF THE FOLLOWING SYMPTOMS: severe headache, pain in your chest/abdomen/back, bleeding from your mouth or rectum, palpitations, shortness of breath, pain with breathing, seizure, confusion, or trouble walking.

## 2018-12-08 NOTE — ED ADULT TRIAGE NOTE - CHIEF COMPLAINT QUOTE
seizures or syncope about 35 minutes ago witnessed by her boyfriend, pt had brain sx 10/29/18 here [facility]

## 2018-12-12 DIAGNOSIS — Z88.2 ALLERGY STATUS TO SULFONAMIDES: ICD-10-CM

## 2018-12-12 DIAGNOSIS — R55 SYNCOPE AND COLLAPSE: ICD-10-CM

## 2018-12-12 DIAGNOSIS — Z88.0 ALLERGY STATUS TO PENICILLIN: ICD-10-CM

## 2018-12-12 DIAGNOSIS — Z79.899 OTHER LONG TERM (CURRENT) DRUG THERAPY: ICD-10-CM

## 2019-01-15 PROBLEM — C71.9 MALIGNANT NEOPLASM OF BRAIN, UNSPECIFIED: Chronic | Status: ACTIVE | Noted: 2018-12-08

## 2019-01-25 ENCOUNTER — FORM ENCOUNTER (OUTPATIENT)
Age: 24
End: 2019-01-25

## 2019-01-26 ENCOUNTER — OUTPATIENT (OUTPATIENT)
Dept: OUTPATIENT SERVICES | Facility: HOSPITAL | Age: 24
LOS: 1 days | End: 2019-01-26
Payer: MEDICAID

## 2019-01-26 ENCOUNTER — APPOINTMENT (OUTPATIENT)
Dept: MRI IMAGING | Facility: HOSPITAL | Age: 24
End: 2019-01-26
Payer: COMMERCIAL

## 2019-01-26 DIAGNOSIS — Z98.890 OTHER SPECIFIED POSTPROCEDURAL STATES: Chronic | ICD-10-CM

## 2019-01-26 PROCEDURE — 70553 MRI BRAIN STEM W/O & W/DYE: CPT

## 2019-01-26 PROCEDURE — 70553 MRI BRAIN STEM W/O & W/DYE: CPT | Mod: 26

## 2019-01-26 PROCEDURE — A9585: CPT

## 2019-01-30 PROBLEM — Z09 POSTOP CHECK: Status: ACTIVE | Noted: 2018-11-09

## 2019-01-31 ENCOUNTER — APPOINTMENT (OUTPATIENT)
Dept: NEUROSURGERY | Facility: CLINIC | Age: 24
End: 2019-01-31
Payer: MEDICAID

## 2019-01-31 DIAGNOSIS — Z09 ENCOUNTER FOR FOLLOW-UP EXAMINATION AFTER COMPLETED TREATMENT FOR CONDITIONS OTHER THAN MALIGNANT NEOPLASM: ICD-10-CM

## 2019-01-31 PROCEDURE — 99214 OFFICE O/P EST MOD 30 MIN: CPT

## 2019-02-06 NOTE — ASSESSMENT
[FreeTextEntry1] : 1. Doing well Post Craniotomy.\par 2. MRI Brain 6-months post op (APRIL 2019) and then OCTOBER 2019 (1-year)\par Education provided regarding plan of care.\par \par

## 2019-02-06 NOTE — PHYSICAL EXAM
[General Appearance - Alert] : alert [General Appearance - In No Acute Distress] : in no acute distress [Oriented To Time, Place, And Person] : oriented to person, place, and time [Impaired Insight] : insight and judgment were intact [Person] : oriented to person [Place] : oriented to place [Time] : oriented to time [Short Term Intact] : short term memory intact [Remote Intact] : remote memory intact [Span Intact] : the attention span was normal [Concentration Intact] : normal concentrating ability [Fluency] : fluency intact [Comprehension] : comprehension intact [Current Events] : adequate knowledge of current events [Past History] : adequate knowledge of personal past history [Vocabulary] : adequate range of vocabulary [Cranial Nerves Optic (II)] : visual acuity intact bilaterally,  pupils equal round and reactive to light [Cranial Nerves Oculomotor (III)] : extraocular motion intact [Cranial Nerves Trigeminal (V)] : facial sensation intact symmetrically [Cranial Nerves Facial (VII)] : face symmetrical [Cranial Nerves Vestibulocochlear (VIII)] : hearing was intact bilaterally [Cranial Nerves Glossopharyngeal (IX)] : tongue and palate midline [Cranial Nerves Accessory (XI - Cranial And Spinal)] : head turning and shoulder shrug symmetric [Cranial Nerves Hypoglossal (XII)] : there was no tongue deviation with protrusion [Motor Tone] : muscle tone was normal in all four extremities [Motor Strength] : muscle strength was normal in all four extremities [No Muscle Atrophy] : normal bulk in all four extremities [Sensation Tactile Decrease] : light touch was intact [Balance] : balance was intact [Past-pointing] : there was no past-pointing [Tremor] : no tremor present [2+] : Patella left 2+ [Sclera] : the sclera and conjunctiva were normal [Outer Ear] : the ears and nose were normal in appearance [Neck Appearance] : the appearance of the neck was normal [] : no respiratory distress [Bowel Sounds] : normal bowel sounds [Abnormal Walk] : normal gait [Skin Color & Pigmentation] : normal skin color and pigmentation

## 2019-02-06 NOTE — ADDENDUM
[FreeTextEntry1] : January 31, 2019  Girish Duncan MD [address] [place] [address]  Re:	Phylicia Hollins   Dear Dr. Duncan:  I saw Phylicia and her mom once again in followup.  As you know, she underwent an elective resection of a large left glial tumor of the posterior frontal/parietal lobe in October.  Phylicia has done beautifully with some mild residual sensory abnormality in her right hand, but other than that, she seems neurologically intact.  Her fields are full.  We performed a 3-month followup MRI this month which shows no evidence of gross residual disease, and I do think she had as total of a resection as possible.  I did review the films with Phylicia and her mom today, and they are both very happy.  We will be obtaining a new MRI at 6-month intervals for the next few years, and I will certainly keep you abreast of her progress.  Thanks so much for referring her to our practice, and I appreciate your kind referral.  Sincerely,    MD SOFÍA Herr/sita [facility] #[id number]

## 2019-02-06 NOTE — HISTORY OF PRESENT ILLNESS
[FreeTextEntry1] : Doing well Post Craniotomy for resection of Low grade glioma 11/2018.\par No neurological deficits.

## 2019-02-06 NOTE — REASON FOR VISIT
[Follow-Up: _____] : a [unfilled] follow-up visit [FreeTextEntry1] : Here for follow up evaluation S/P Craniotomy for resection of low grade glioma.\par New MRI for revied

## 2019-02-06 NOTE — DATA REVIEWED
[de-identified] : \par EXAM: MR BRAIN WAW IC \par \par PROCEDURE DATE: 01/26/2019 \par \par \par \par INTERPRETATION: BRAIN MR WITH CONTRAST \par \par INDICATION: Status post resection of low-grade glioma. Assess for interval \par change. \par \par TECHNIQUE: Sagittal T1, axial T1, T2, FLAIR, gradient echo, and \par diffusion-weighted imaging of the brain is obtained followed by axial, \par coronal, and sagittal T1 images postcontrast. 7.5 cc of Gadavist was given, \par and none was discarded. \par \par COMPARISON: 11/1/2018 and 10/29/2018. \par \par FINDINGS: \par \par In comparison to the prior postoperative study, there is expected evolution \par of postoperative changes with complete resolution of subcutaneous emphysema \par and fluid in the subcutaneous tissues extrinsic to the left parietal \par craniotomy defect. There is interval diminution in the extra-axial fluid \par noted subjacent to the craniotomy. There is interval retraction in the \par resection cavity with linear, nonnodular enhancement along the margins of \par the resection cavity with a couple of enhancing septations within the \par resection cavity likely postoperative change. There is less abnormal T2 \par signal seen along the anterior and superior margins of the resection cavity \par likely resolving postoperative change. No residual tumor is detected \par identified. \par \par Ventricles are unremarkable in size and configuration. There is no \par hydrocephalus. There is no significant midline shift. There is no acute \par infarct. \par \par Visualized vascular flow voids are unremarkable. There are air-fluid levels \par in the paranasal sinuses. Mastoid air cells demonstrate good aeration. \par Craniovertebral junction is unremarkable. Cerebellar tonsils are orthotopic, \par and the sella turcica is unremarkable. \par \par IMPRESSION: \par \par Status post left parietal craniotomy with resolving postoperative changes \par extrinsic and subjacent to the craniotomy with interval retraction in the \par resection cavity demonstrating thin, linear, nonnodular enhancement in the \par periphery likely postoperative change with interval diminution in abnormal \par T2 signal along the anterosuperior margins of resection cavity. No \par definitive residual tumor identified. Continued MR follow-up is recommended. \par \par \par \par \par \par "Thank you for the opportunity to participate in the care of this patient." \par \par \par \par JASON ALTAMIRANO M.D., ATTENDING RADIOLOGIST \par This document has been electronically signed. Jan 28 2019 11:10AM \par \par \par \par

## 2019-05-18 ENCOUNTER — FORM ENCOUNTER (OUTPATIENT)
Age: 24
End: 2019-05-18

## 2019-05-19 ENCOUNTER — OUTPATIENT (OUTPATIENT)
Dept: OUTPATIENT SERVICES | Facility: HOSPITAL | Age: 24
LOS: 1 days | End: 2019-05-19
Payer: MEDICAID

## 2019-05-19 ENCOUNTER — APPOINTMENT (OUTPATIENT)
Dept: MRI IMAGING | Facility: HOSPITAL | Age: 24
End: 2019-05-19
Payer: MEDICAID

## 2019-05-19 DIAGNOSIS — Z98.890 OTHER SPECIFIED POSTPROCEDURAL STATES: Chronic | ICD-10-CM

## 2019-05-19 PROCEDURE — 70553 MRI BRAIN STEM W/O & W/DYE: CPT

## 2019-05-19 PROCEDURE — 70553 MRI BRAIN STEM W/O & W/DYE: CPT | Mod: 26

## 2019-05-19 PROCEDURE — A9585: CPT

## 2019-06-13 ENCOUNTER — APPOINTMENT (OUTPATIENT)
Dept: NEUROSURGERY | Facility: CLINIC | Age: 24
End: 2019-06-13
Payer: MEDICAID

## 2019-06-13 VITALS
HEART RATE: 80 BPM | BODY MASS INDEX: 18.05 KG/M2 | DIASTOLIC BLOOD PRESSURE: 74 MMHG | WEIGHT: 115 LBS | HEIGHT: 67 IN | RESPIRATION RATE: 18 BRPM | OXYGEN SATURATION: 100 % | SYSTOLIC BLOOD PRESSURE: 106 MMHG

## 2019-06-13 PROCEDURE — 99213 OFFICE O/P EST LOW 20 MIN: CPT
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
## 2019-06-18 NOTE — ASSESSMENT
[FreeTextEntry1] : Doing well  with no neurological deficits.\par No Residual or recurrent tumor on New MRI 6-Months Post.\par Education provided regarding plan of care.\par \par

## 2019-06-18 NOTE — DATA REVIEWED
[de-identified] : EXAM: MR BRAIN WAW IC \par \par PROCEDURE DATE: 05/19/2019 \par \par \par \par INTERPRETATION: PROCEDURE: MRI Brain with and without contrast \par \par INDICATION: Status post craniotomy for resection of brain tumor. Assess for \par residual brain tumor. \par \par TECHNIQUE: Sagittal and axial T1, sagittal 3-D T2-FLAIR with MPR, axial T2 \par FFE and diffusion-weighted imaging of the brain is obtained. Following the \par bolus intravenous administration of 7.5 cc Gadavist, dynamic susceptibility \par perfusion is obtained with perfusion maps generated by the attending \par radiologist using separate software. Axial T2, T1 spin-echo, and sagittal T1 \par volumetric imaging is obtained post contrast with MPR. \par \par COMPARISON: MR brain from 1/26/2019 and 10/29/2018. \par \par FINDINGS: \par \par The patient is status post left parietal craniotomy for resection of a left \par posterior parietal mass. There is persistent regional dural enhancement and \par thickening subjacent to the craniotomy. There is unchanged appearance of the \par CSF filled left parietal resection cavity. There is a unchanged enhancing \par septations within the resection cavity that is likely granulation tissue. \par There is no masslike or nodular enhancement within the operative bed. \par Perfusion imaging shows no focus of elevated blood volume at the operative \par site. \par \par The current study includes 3D-FLAIR imaging where as other postoperative \par studies did not. This makes comparison limited. Grossly, there is no \par significant change multifocal T2 hyperintense signal surrounding the \par surgical cavity. However, findings are more conspicuous on this exam due to \par technique. For reference, T2 hyperintense signal extends anteriorly in the \par periventricular white matter (series [id number], image 43) and contralateral \par periatrial signal abnormality is identified and may be more conspicuous due \par to differing technique. Note that a preoperative study from October 2018 \par included 3D FLAIR imaging and it did not show this signal. At the midline, \par the splenium of the corpus callosum does not appear expanded. \par \par There is no extra-axial collection, midline shift or hydrocephalus. There is \par ex vacuo dilatation of the occipital horn of the left lateral ventricle, \par unchanged. No hydrocephalus. \par \par The diffusion-weighted images demonstrate no acute infarction. There are \par preserved large vascular flow-voids. Susceptibility images show no \par parenchymal blood product deposition remote from the surgical site. \par \par The visualized paranasal sinuses are free of mucosal disease. The mastoid \par air cells are well-aerated. \par \par IMPRESSION: \par \par Grossly similar exam compared to 01/26/2019 given differences in technique. \par No enhancing mass or visible progression of disease. \par \par There is signal abnormality in the bilateral periventricular white matter \par that is new from preoperative imaging and could be a postsurgical gliotic \par effect versus nonenhancing tumor. Long term surveillance of this is \par required, and future surveillance MRI using 3D FLAIR imaging is advised and \par should be requested for most optimal comparison. \par \par \par \par \par \par "Thank you for the opportunity to participate in the care of this patient." \par \par YONI KEENE M.D., RADIOLOGY RESIDENT \par This document has been electronically signed. \par CHEMA MAJOR M.D. ATTENDING RADIOLOGIST \par This document has been electronically signed. May 22 2019 2:51PM \par \par \par

## 2019-06-18 NOTE — RESULTS/DATA
[FreeTextEntry1] : \par  1 Delfino Major   \par  \par \par \par \par Report date: 5/22/2019 View Order\par \par \par \par (Report matches study selected on Patient History pane)\par \par \par   \par \par \par \par \par \par EXAM: MR BRAIN WAW IC \par \par PROCEDURE DATE: 05/19/2019 \par \par \par \par INTERPRETATION: PROCEDURE: MRI Brain with and without contrast \par \par INDICATION: Status post craniotomy for resection of brain tumor. Assess for \par residual brain tumor. \par \par TECHNIQUE: Sagittal and axial T1, sagittal 3-D T2-FLAIR with MPR, axial T2 \par FFE and diffusion-weighted imaging of the brain is obtained. Following the \par bolus intravenous administration of 7.5 cc Gadavist, dynamic susceptibility \par perfusion is obtained with perfusion maps generated by the attending \par radiologist using separate software. Axial T2, T1 spin-echo, and sagittal T1 \par volumetric imaging is obtained post contrast with MPR. \par \par COMPARISON: MR brain from 1/26/2019 and 10/29/2018. \par \par FINDINGS: \par \par The patient is status post left parietal craniotomy for resection of a left \par posterior parietal mass. There is persistent regional dural enhancement and \par thickening subjacent to the craniotomy. There is unchanged appearance of the \par CSF filled left parietal resection cavity. There is a unchanged enhancing \par septations within the resection cavity that is likely granulation tissue. \par There is no masslike or nodular enhancement within the operative bed. \par Perfusion imaging shows no focus of elevated blood volume at the operative \par site. \par \par The current study includes 3D-FLAIR imaging where as other postoperative \par studies did not. This makes comparison limited. Grossly, there is no \par significant change multifocal T2 hyperintense signal surrounding the \par surgical cavity. However, findings are more conspicuous on this exam due to \par technique. For reference, T2 hyperintense signal extends anteriorly in the \par periventricular white matter (series [id number], image 43) and contralateral \par periatrial signal abnormality is identified and may be more conspicuous due \par to differing technique. Note that a preoperative study from October 2018 \par included 3D FLAIR imaging and it did not show this signal. At the midline, \par the splenium of the corpus callosum does not appear expanded. \par \par There is no extra-axial collection, midline shift or hydrocephalus. There is \par ex vacuo dilatation of the occipital horn of the left lateral ventricle, \par unchanged. No hydrocephalus. \par \par The diffusion-weighted images demonstrate no acute infarction. There are \par preserved large vascular flow-voids. Susceptibility images show no \par parenchymal blood product deposition remote from the surgical site. \par \par The visualized paranasal sinuses are free of mucosal disease. The mastoid \par air cells are well-aerated. \par \par IMPRESSION: \par \par Grossly similar exam compared to 01/26/2019 given differences in technique. \par No enhancing mass or visible progression of disease. \par \par There is signal abnormality in the bilateral periventricular white matter \par that is new from preoperative imaging and could be a postsurgical gliotic \par effect versus nonenhancing tumor. Long term surveillance of this is \par required, and future surveillance MRI using 3D FLAIR imaging is advised and \par should be requested for most optimal comparison. \par \par \par \par \par \par "Thank you for the opportunity to participate in the care of this patient." \par \par YONI KEENE M.D., RADIOLOGY RESIDENT \par This document has been electronically signed. \par DELFINO MAJOR M.D. ATTENDING RADIOLOGIST \par This document has been electronically signed. May 22 2019 2:51PM \par \par \par \par \par \par \par \par    \par  \par  \par \par \par \par    \par \par  \par \par  \par \par  \par \par \par

## 2019-06-18 NOTE — PHYSICAL EXAM
[General Appearance - In No Acute Distress] : in no acute distress [General Appearance - Alert] : alert [I: Normal Smell] : smell intact bilaterally [Impaired Insight] : insight and judgment were intact [Oriented To Time, Place, And Person] : oriented to person, place, and time [Cranial Nerves Optic (II)] : visual acuity intact bilaterally,  pupils equal round and reactive to light [Cranial Nerves Oculomotor (III)] : extraocular motion intact [Cranial Nerves Trigeminal (V)] : facial sensation intact symmetrically [Cranial Nerves Vestibulocochlear (VIII)] : hearing was intact bilaterally [Cranial Nerves Glossopharyngeal (IX)] : tongue and palate midline [Cranial Nerves Facial (VII)] : face symmetrical [Cranial Nerves Hypoglossal (XII)] : there was no tongue deviation with protrusion [Cranial Nerves Accessory (XI - Cranial And Spinal)] : head turning and shoulder shrug symmetric [Motor Tone] : muscle tone was normal in all four extremities [Balance] : balance was intact [Motor Strength] : muscle strength was normal in all four extremities [Sclera] : the sclera and conjunctiva were normal [Outer Ear] : the ears and nose were normal in appearance [] : no respiratory distress [Exaggerated Use Of Accessory Muscles For Inspiration] : no accessory muscle use [Neck Appearance] : the appearance of the neck was normal [Heart Rate And Rhythm] : heart rate was normal and rhythm regular [Abdomen Soft] : soft [Abnormal Walk] : normal gait [Skin Color & Pigmentation] : normal skin color and pigmentation

## 2019-06-18 NOTE — ADDENDUM
[FreeTextEntry1] : June 13, 2019\par \par Girish Duncan MD\par [address] [facility]\[place] [address]\par \par Re:	Phylicia Dyer \par \par Dear Dr. Duncan:\par \par I saw Phylicia and her mom in my office in followup today.  She obtained a new MRI in May which shows no evidence of recurrent or residual tumor, and I am very happy with her progress.  We will continue to do MRIs annually, but I am very optimistic that this is a curative operation and told Phylicia so.  She has returned to her full-time activities and quality of life, and we are very happy with her progress.\par \par Thanks so much for allowing me to contribute to her care.\par \par Sincerely,\par \par \par \par Jermaine Gardiner MD\par \par DL/ag [facility] #[id number]\par \par \par

## 2019-07-15 ENCOUNTER — OTHER (OUTPATIENT)
Age: 24
End: 2019-07-15

## 2019-07-17 ENCOUNTER — APPOINTMENT (OUTPATIENT)
Dept: NEUROLOGY | Facility: CLINIC | Age: 24
End: 2019-07-17
Payer: MEDICAID

## 2019-07-17 VITALS
BODY MASS INDEX: 18.52 KG/M2 | DIASTOLIC BLOOD PRESSURE: 76 MMHG | OXYGEN SATURATION: 100 % | SYSTOLIC BLOOD PRESSURE: 116 MMHG | HEIGHT: 67 IN | WEIGHT: 118 LBS | HEART RATE: 77 BPM

## 2019-07-17 PROCEDURE — 95819 EEG AWAKE AND ASLEEP: CPT

## 2019-07-17 PROCEDURE — 99214 OFFICE O/P EST MOD 30 MIN: CPT

## 2019-07-17 RX ORDER — CLONAZEPAM 0.25 MG/1
0.25 TABLET, ORALLY DISINTEGRATING ORAL
Qty: 10 | Refills: 0 | Status: DISCONTINUED | COMMUNITY
Start: 2018-11-28 | End: 2019-07-17

## 2019-07-17 NOTE — REVIEW OF SYSTEMS
[As Noted in HPI] : as noted in HPI [Negative] : Heme/Lymph [FreeTextEntry1] : ROS intake form reviewed with patient, see attachment

## 2019-07-17 NOTE — DISCUSSION/SUMMARY
[FreeTextEntry1] : Impression:\par 1) Phylicia is a 22 yo RHF originally from [place], with history of left parietal low grade glioma, WHO II, IDH mutant, s/p resection Oct 29, 2018.  The tumor was sizable, with growth known over the past 9 years after being found as an incidental finding.  Levetiracetam was initiated but poorly tolerated it and stopped in Nov/Dec.  Post-operative MRI shows expected changes.  \par 2) this was pretty clearly a seizure, though simple partial/FAS, but it shows the potential for further and at least 1 year of use.\par 3) not driving.\par \par Plan:\par 1) rEEG/ambEEG to see if we can wait to start lamotrigine, or whether we would need to start something with faster onset.  \par 2) continue clonazepam 0.25mg tabs, particularly during travel.\par 3) f/u in 6-8 weeks\par

## 2019-07-17 NOTE — HISTORY OF PRESENT ILLNESS
[FreeTextEntry1] : \jose Whatley is a 22 yo RHF for re-evaluation for tumor and seizure.\par \par She was seen here Nov 28 2018, and we decided it would be reasonable to stop leveitracetam.  She was not tolerating it well, in terms of mood, edgy and very tired.\par \par She had noticed after surgery Oct 2018, the right arm and left felt weak after, and effortful.  She found her spatial processing and hand-eye coordination using the right hand.  She noticed after stopping levetiracetam she seemed to improve to 100%.\par \par However, about 1.5 weeks ago, she awoke feeling anxious.  At work, she felt her right leg feel weak, similar to after the surgery.  It was trembling a bit, but she was walking on it.  Within seconds she also felt it in the right arm, and felt a pulling of her shoulder/arm back, and she saw her right arm shake, and the leg shake as well.  She had no confusion and was able to call Dr. Gardiner during the event.  He sent some levetiracetam, but took one pill and felt horrible again, and stopped taking it again.\par \par \par \par Prior:\par When she was 14 years old she fell off a horse, and an MRI was done, which showed an incidental tumor.  Her neurosurgeon in [place] suggested following the tumor every 6 months.  It did not grow for many years but in the past few years there were some minimal changes, last MRI done in 2016.\par \par No seizures, and never felt 'weird'.\par \par In 2018, the first MRI in [place] showed 2cm growth from 2011 films, measuring, 4.6 x 3.5 x 3.4cm, left parietal at the left. \par \par Post-operatively, she had a weird reaction, not fully recognizing that people on the right were really there on the right, and some mild motor problems.  The scar is barely notable, and she is very pleased with the results.\par \par Levetiracetam was started in the post-operative time period, just in case, though no seizures.  She had been on steroids for the week after as well, and felt high the entire time.\par \par For 2 weeks she has been off the LEV.  The rEEG Nov 20th showed only slowing in the area of surgery.\par \par

## 2019-07-17 NOTE — PHYSICAL EXAM
[FreeTextEntry1] : General:\par Constitutional:  Sitting comfortably in NAD.\par Psychiatric: well-groomed, appropriate affect\par Ears, Nose, Throat: no abnormalities, mucus membranes moist\par Neck: supple\par Extremities: no edema, clubbing or cyanosis\par Skin: no rash or neuro-cutaneous signs \par \par Cognitive:\par Orientation, language, memory and knowledge screens intact.\par \par Cranial Nerves:\par II: ANGELIA. III/IV/VI: EOM Full.  VII: Face appears symmetric VIII: Normal to screening\par IX/X: normal phonation  XI: Trapezius Symmetric  XII: Tongue midline\par Motor:\par Power: no pronator drift, normal finger extensor power.\par Tone: normal\par Coordination: finger taps, heel taps very normal and rapid.\par MATI normal in U/E.\par Sensory:  very sensitive vibration sense in finger tips.\par \par Narrow based gait\par

## 2019-07-18 PROCEDURE — 95953: CPT

## 2019-07-19 ENCOUNTER — APPOINTMENT (OUTPATIENT)
Dept: NEUROLOGY | Facility: CLINIC | Age: 24
End: 2019-07-19

## 2019-07-19 PROCEDURE — 95953: CPT

## 2019-08-28 ENCOUNTER — RX RENEWAL (OUTPATIENT)
Age: 24
End: 2019-08-28

## 2019-10-30 NOTE — ED ADULT NURSE NOTE - PAIN RATING/NUMBER SCALE (0-10): ACTIVITY
Render Note In Bullet Format When Appropriate: No Post-Care Instructions: I reviewed with the patient in detail post-care instructions. Patient is to wear sunprotection, and avoid picking at any of the treated lesions. Pt may apply Vaseline to crusted or scabbing areas. Number Of Freeze-Thaw Cycles: 2 freeze-thaw cycles Detail Level: Detailed Consent: The patient's consent was obtained including but not limited to risks of crusting, scabbing, blistering, scarring, darker or lighter pigmentary change, recurrence, incomplete removal and infection. Duration Of Freeze Thaw-Cycle (Seconds): 3 Medical Necessity Information: It is in your best interest to select a reason for this procedure from the list below. All of these items fulfill various CMS LCD requirements except the new and changing color options. 0 Medical Necessity Clause: This procedure was medically necessary because the lesions that were treated were: at risk for and/or subject to recurrent physical trauma as a result of lesion type and location with history of the same, bleeding and irritated.

## 2020-02-28 NOTE — DISCHARGE NOTE ADULT - PATIENT PORTAL LINK FT
Patient is requesting a refill on Oxycodone. He is requesting it be sent to [facility] in [place].    You can access the [facility] Patient Portal, offered by [facility], by registering with the following website: [email or web address]

## 2020-03-03 ENCOUNTER — OUTPATIENT (OUTPATIENT)
Dept: OUTPATIENT SERVICES | Facility: HOSPITAL | Age: 25
LOS: 1 days | End: 2020-03-03
Payer: MEDICAID

## 2020-03-03 ENCOUNTER — APPOINTMENT (OUTPATIENT)
Dept: MRI IMAGING | Facility: HOSPITAL | Age: 25
End: 2020-03-03
Payer: MEDICAID

## 2020-03-03 DIAGNOSIS — Z98.890 OTHER SPECIFIED POSTPROCEDURAL STATES: Chronic | ICD-10-CM

## 2020-03-03 PROCEDURE — A9585: CPT

## 2020-03-03 PROCEDURE — 70553 MRI BRAIN STEM W/O & W/DYE: CPT | Mod: 26

## 2020-03-03 PROCEDURE — 70553 MRI BRAIN STEM W/O & W/DYE: CPT

## 2020-03-12 ENCOUNTER — APPOINTMENT (OUTPATIENT)
Dept: NEUROLOGY | Facility: CLINIC | Age: 25
End: 2020-03-12
Payer: MEDICAID

## 2020-03-12 VITALS
SYSTOLIC BLOOD PRESSURE: 124 MMHG | WEIGHT: 122 LBS | HEIGHT: 67 IN | BODY MASS INDEX: 19.15 KG/M2 | HEART RATE: 90 BPM | OXYGEN SATURATION: 98 % | DIASTOLIC BLOOD PRESSURE: 78 MMHG

## 2020-03-12 PROCEDURE — 99215 OFFICE O/P EST HI 40 MIN: CPT

## 2020-03-12 RX ORDER — LAMOTRIGINE 25 MG/1
25 TABLET ORAL
Qty: 120 | Refills: 0 | Status: DISCONTINUED | COMMUNITY
Start: 2019-07-25 | End: 2020-03-12

## 2020-04-06 RX ORDER — LAMOTRIGINE 25 MG/1
25 TABLET ORAL
Qty: 90 | Refills: 3 | Status: DISCONTINUED | COMMUNITY
Start: 2020-03-12 | End: 2020-04-06

## 2020-06-11 ENCOUNTER — OUTPATIENT (OUTPATIENT)
Dept: OUTPATIENT SERVICES | Facility: HOSPITAL | Age: 25
LOS: 1 days | End: 2020-06-11

## 2020-06-11 ENCOUNTER — APPOINTMENT (OUTPATIENT)
Dept: MRI IMAGING | Facility: CLINIC | Age: 25
End: 2020-06-11
Payer: MEDICAID

## 2020-06-11 DIAGNOSIS — Z98.890 OTHER SPECIFIED POSTPROCEDURAL STATES: Chronic | ICD-10-CM

## 2020-06-11 PROCEDURE — 70553 MRI BRAIN STEM W/O & W/DYE: CPT | Mod: 26

## 2020-06-18 ENCOUNTER — APPOINTMENT (OUTPATIENT)
Dept: NEUROLOGY | Facility: CLINIC | Age: 25
End: 2020-06-18

## 2020-07-02 ENCOUNTER — APPOINTMENT (OUTPATIENT)
Dept: NEUROSURGERY | Facility: CLINIC | Age: 25
End: 2020-07-02
Payer: MEDICAID

## 2020-07-02 VITALS — TEMPERATURE: 96 F | DIASTOLIC BLOOD PRESSURE: 69 MMHG | SYSTOLIC BLOOD PRESSURE: 118 MMHG

## 2020-07-02 VITALS
HEART RATE: 116 BPM | RESPIRATION RATE: 18 BRPM | DIASTOLIC BLOOD PRESSURE: 69 MMHG | SYSTOLIC BLOOD PRESSURE: 118 MMHG | TEMPERATURE: 96 F | OXYGEN SATURATION: 99 %

## 2020-07-02 PROCEDURE — 99214 OFFICE O/P EST MOD 30 MIN: CPT

## 2020-07-08 NOTE — DATA REVIEWED
[de-identified] : EXAM:  MR BRAIN WAW IC  \par \par PROCEDURE DATE:  06/11/2020  \par \par \par \par \par INTERPRETATION:  BRAIN MR WITH CONTRAST\par \par INDICATION: Glioma.\par \par TECHNIQUE: Sagittal T1, sagittal volumetric FLAIR followed by axial and coronal FLAIR reformatted images, axial T1, T2, SWI, and diffusion-weighted imaging of the brain was obtained. After 5.5 cc of Gadavist was given, volumetric sagittal T1 postcontrast images were obtained along with reformatted axial and coronal T1 postcontrast images and additional axial spin-echo T1 postcontrast. \par \par COMPARISON: Brain MR from 3/3/2020.\par \par FINDINGS:\par Patient demonstrates prior left parietal convexity craniotomy. There are stable postoperative changes subjacent to the craniotomy defect including hemosiderin staining. There is no abnormal enhancement to suggest disease recurrence. There is stable, curvilinear and somewhat stellate enhancement at the level of the resection cavity likely granulation tissue. There is stable hemosiderin staining along the margins of the resection cavity.\par \par There is stable, nonenhancing, abnormal T2 prolongation along the anterior margin of the resection cavity when compared to most recent prior exam. No new areas of abnormal T2/FLAIR signal or abnormal enhancement are seen. There are stable areas of abnormal T2 prolongation within the periventricular white matter of the level of the atrium and posterior body of the left lateral ventricle and the right parietal subcortical white matter to the level of the atrium. These appears to be slightly less prominent signal abnormality in these areas, which likely relates to differences in magnetic field strength given prior study was performed at higher field strength.\par \par There is less conspicuity of the previously noted nodule of T2 prolongation in the left parietal subcortical white matter (series 301, image #37), which demonstrates no enhancement.\par \par The ventricles are unremarkable in size and configuration. There is no hydrocephalus. There is no significant midline shift. There is no acute infarct on diffusion-weighted images. Visualized vascular flow-voids are unremarkable. No abnormal susceptibility foci are noted in the brain parenchyma.  Tympanomastoid cavities are predominantly clear. Craniovertebral junction is unremarkable. Sella is unremarkable.\par \par IMPRESSION:\par \par Stable exam. No change in the postoperative appearance of the left parietal resection cavity. Stable, nonenhancing, abnormal T2/FLAIR signal along the anterior margin of the resection cavity in the left parietal subcortical white matter. No abnormal enhancement to suggest disease progression. No new abnormal enhancement or new areas of abnormal T2/FLAIR signal.\par \par \par \par \par \par \par \par  \par \par \par JASON ALTAMIRANO M.D., ATTENDING RADIOLOGIST\par This document has been electronically signed. 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 Surveillance scan.\par \par TECHNIQUE: Sagittal and axial T1, sagittal 3-D T2-FLAIR with MPR, axial T2 FFE and diffusion-weighted imaging of the brain is obtained. Following the bolus intravenous administration of 10 cc Gadavist, dynamic susceptibility perfusion is obtained with perfusion maps generated by the attending radiologist using separate software. Axial T2, T1 spin-echo, and sagittal T1 volumetric imaging is obtained post contrast with MPR. \par \par COMPARISON: 05/19/2019, 01/26/2019\par \par FINDINGS:\par \par At the left parietal lobe resection bed, there is stable curvilinear and somewhat stellate enhancing tissue consistent with granulation. There is no nodular or masslike enhancement. \par \par T2-FLAIR imaging shows subtle progression of nonenhancing FLAIR signal at the anterior margin of resection, best demonstrated on series 402, images 24-5, compared to the 05/19/2019 study series 402, images 55-56. Previously described hazy FLAIR signal in both ipsilateral and contralateral periventricular parietal white matter is similar to slightly subsided. Interestingly and also subsided, anterolateral to surgery in parietal white matter, there is a 2.5 mm nodule of nonenhancing FLAIR signal (series 401, image 100) that previously measured 4mm.\par \par Perfusion imaging demonstrates no site of elevated blood volume at or around the surgical cavity.\par \par The ventricles remain nonenlarged. No intracranial mass effect. No fluid collection.\par \par The diffusion-weighted images demonstrate no acute ischemia. There are preserved vascular flow-voids. Susceptibility images show no parenchymal blood product deposition remote from the surgical site.\par \par The visualized paranasal sinuses are free of mucosal disease. The mastoid air cells are well-aerated.\par \par \par IMPRESSION:\par \par No evidence of enhancing or hyperperfusing tumoral recurrence.\par \par Since May 2019, there is subtle increase in FLAIR signal anterior the surgical cavity whereas contralateral FLAIR signal is subsided. Attention on follow-up is advised.\par \par \par \par \par \par Thank you for the opportunity to participate in the care of this patient.\par \par \par \par CHEMA MAJOR M.D. ATTENDING RADIOLOGIST\par This document has been electronically signed. Mar  4 2020 12:06PM\par   \par \par   \par \par \par \par EXAM:  MR BRAIN WAW IC                      \par \par PROCEDURE DATE:  05/19/2019  \par \par \par \par INTERPRETATION:  PROCEDURE: MRI Brain with and without contrast\par \par INDICATION: Status post craniotomy for resection of brain tumor. Assess \par for residual brain tumor.\par \par TECHNIQUE: Sagittal and axial T1, sagittal 3-D T2-FLAIR with MPR, axial \par T2 FFE and diffusion-weighted imaging of the brain is obtained. Following \par the bolus intravenous administration of 7.5 cc Gadavist, dynamic \par susceptibility perfusion is obtained with perfusion maps generated by the \par attending radiologist using separate software. Axial T2, T1 spin-echo, \par and sagittal T1 volumetric imaging is obtained post contrast with MPR.\par \par COMPARISON: MR brain from 1/26/2019 and 10/29/2018.\par \par FINDINGS: \par \par The patient is status post left parietal craniotomy for resection of a \par left posterior parietal mass. There is persistent regional dural \par enhancement and thickening subjacent to the craniotomy. There is \par unchanged appearance of the CSF filled left parietal resection cavity. \par There is a unchanged enhancing septations within the resection cavity \par that is likely granulation tissue. There is no masslike or nodular \par enhancement within the operative bed. Perfusion imaging shows no focus of \par elevated blood volume at the operative site.\par \par The current study includes 3D-FLAIR imaging where as other postoperative \par studies did not. This makes comparison limited. Grossly, there is no \par significant change multifocal T2 hyperintense signal surrounding the \par surgical cavity. However, findings are more conspicuous on this exam due \par to technique. For reference, T2 hyperintense signal extends anteriorly in \par the periventricular white matter (series 211049, image 43) and \par contralateral periatrial signal abnormality is identified and may be more \par conspicuous due to differing technique. Note that a preoperative study \par from October 2018 included 3D FLAIR imaging and it did not show this \par signal. At the midline, the splenium of the corpus callosum does not \par appear expanded.\par \par There is no extra-axial collection, midline shift or hydrocephalus. There \par is ex vacuo dilatation of the occipital horn of the left lateral \par ventricle, unchanged. No hydrocephalus. \par \par The diffusion-weighted images demonstrate no acute infarction. There are \par preserved large vascular flow-voids. Susceptibility images show no \par parenchymal blood product deposition remote from the surgical site.\par \par The visualized paranasal sinuses are free of mucosal disease. The mastoid \par air cells are well-aerated.\par \par IMPRESSION:\par \par Grossly similar exam compared to 01/26/2019 given differences in \par technique. No enhancing mass or visible progression of disease.\par \par There is signal abnormality in the bilateral periventricular white matter \par that is new from preoperative imaging and could be a postsurgical gliotic \par effect versus nonenhancing tumor. Long term surveillance of this is \par required, and future surveillance MRI using 3D FLAIR imaging is advised \par and should be requested for most optimal comparison.\par \par \par \par \par \par "Thank you for the opportunity to participate in the care of this \par patient."\par \par YONI KEENE M.D., RADIOLOGY RESIDENT\par This document has been electronically signed.\par CHEMA MAJOR M.D. ATTENDING RADIOLOGIST\par This document has been electronically signed. May 22 2019  2:51PM\par   \par \par   \par \par \par \par EXAM:  MR BRAIN WAW IC                      \par \par PROCEDURE DATE:  01/26/2019  \par \par \par \par INTERPRETATION:  BRAIN MR WITH CONTRAST\par \par INDICATION: Status post resection of low-grade glioma. Assess for \par interval change.\par \par TECHNIQUE: Sagittal T1, axial T1, T2, FLAIR, gradient echo, and \par diffusion-weighted imaging of the brain is obtained followed by axial, \par coronal, and sagittal T1 images postcontrast. 7.5 cc of Gadavist was \par given, and none was discarded.\par \par COMPARISON: 11/1/2018 and 10/29/2018.\par \par FINDINGS:\par \par In comparison to the prior postoperative study, there is expected \par evolution of postoperative changes with complete resolution of \par subcutaneous emphysema and fluid in the subcutaneous tissues extrinsic to \par the left parietal craniotomy defect. There is interval diminution in the \par extra-axial fluid noted subjacent to the craniotomy. There is interval \par retraction in the resection cavity with linear, nonnodular enhancement \par along the margins of the resection cavity with a couple of enhancing \par septations within the resection cavity likely postoperative change. There \par is less abnormal T2 signal seen along the anterior and superior margins \par of the resection cavity likely resolving postoperative change. No \par residual tumor is detected identified.\par \par Ventricles are unremarkable in size and configuration. There is no \par hydrocephalus. There is no significant midline shift. There is no acute \par infarct.\par \par Visualized vascular flow voids are unremarkable. There are air-fluid \par levels in the paranasal sinuses. Mastoid air cells demonstrate good \par aeration. Craniovertebral junction is unremarkable. Cerebellar tonsils \par are orthotopic, and the sella turcica is unremarkable.\par \par IMPRESSION: \par \par Status post left parietal craniotomy with resolving postoperative changes \par extrinsic and subjacent to the craniotomy with interval retraction in the \par resection cavity demonstrating  thin, linear, nonnodular enhancement in \par the periphery likely postoperative change with interval diminution in \par abnormal T2 signal along the anterosuperior margins of resection cavity. \par No definitive residual tumor identified. Continued MR follow-up is \par recommended.\par \par \par \par \par \par "Thank you for the opportunity to participate in the care of this \par patient."\par \par \par \par JASON ALTAMIRANO M.D., ATTENDING RADIOLOGIST\par This document has been electronically signed. Jan 28 2019 11:10AM\par   \par \par   \par \par \par   \par \par   \par

## 2020-07-08 NOTE — ASSESSMENT
[FreeTextEntry1] : Plan:\par - Continue to follow up with Neurology for partial seizures, continue with Lamictal.\par - We will obtain another brain MRI in 3 months (September 2020). She will come back to review.\par - Once new imaging has been completed, we will review in Tumor Board.\par \par Patient and mother both verbalize understanding and agreement with current plan.

## 2020-07-08 NOTE — HISTORY OF PRESENT ILLNESS
[Medical Office: ([facility])___] : at the medical office located in  [Home] : at home, [unfilled] , at the time of the visit. [Verbal consent obtained from patient] : the patient, [unfilled] [FreeTextEntry1] : \par \par MRI was done 7/1/2011, and the lesion was followed. It appears to have grown since 2011. Most recent MRI on 4/27/18.\par \par She denies headaches, vision changes, gait changes, weakness, numbness or tingling or seizures. No weakness on her R side.\par \par She presents with an MRI brain for review and to discuss treatment and/or follow-up options. \par  \par

## 2020-07-08 NOTE — PHYSICAL EXAM
[General Appearance - Alert] : alert [General Appearance - In No Acute Distress] : in no acute distress [Clean] : clean [Dry] : dry [Well-Healed] : well-healed [Intact] : intact [Oriented To Time, Place, And Person] : oriented to person, place, and time [Sclera] : the sclera and conjunctiva were normal [Outer Ear] : the ears and nose were normal in appearance [] : no respiratory distress [Neck Appearance] : the appearance of the neck was normal [Bowel Sounds] : normal bowel sounds [Arterial Pulses Carotid] : carotid pulses were normal with no bruits [Apical Impulse] : the apical impulse was normal [No CVA Tenderness] : no ~M costovertebral angle tenderness [Abnormal Walk] : normal gait [Skin Color & Pigmentation] : normal skin color and pigmentation [FreeTextEntry1] : cranium

## 2020-07-08 NOTE — ADDENDUM
[FreeTextEntry1] : \par PATIENT:	Phylicia Hollins 				\par \par \par Thursday, July 2, 2020\par \par I saw Phylicia and her mother in followup.  A followup MRI performed in June shows evidence of persistent signal abnormality at what looks to be her sensory cortex.  She continues to have seizures approximately every 3 weeks and remains on lamotrigine 100 mg a day.  I once again encouraged her to reach out to Halina Hardin to make sure that she is managing her frequent seizures.  We additionally have recommended that she do MRIs every 3 months to ensure there is no significant change in the size or consistency of her residual tumor.  I will be following up with her in 3 months’ time.\par \par \par \par Jermaine Gardiner MD\par \par DL/ag [facility] #[id number]\par \par \par \par

## 2020-07-17 ENCOUNTER — APPOINTMENT (OUTPATIENT)
Dept: NEUROLOGY | Facility: CLINIC | Age: 25
End: 2020-07-17

## 2020-07-20 ENCOUNTER — APPOINTMENT (OUTPATIENT)
Dept: NEUROLOGY | Facility: CLINIC | Age: 25
End: 2020-07-20

## 2020-07-20 NOTE — DATA REVIEWED
[de-identified] : MRI brain March 2020 personally reviewed, left parietal resection cavity noted, FLAIR hyperintensity anterior to resection cavity noted\par  [de-identified] : Ambulatory EEG July 2019, left frontotemporal slowing and rare sharp waves

## 2020-07-20 NOTE — ASSESSMENT
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

## 2020-07-20 NOTE — PHYSICAL EXAM
[FreeTextEntry1] : Physical Exam\par Constitutional: no apparent distress\par Psychiatric: normal affect, euthyhmic, alert and oriented x 3\par HEENT: moist mucous membranes, oropharynx clear\par Neck: supple, no lymphadenopathy\par Respiratory: clear to auscultation bilaterally, no crackles or wheezing\par Cardiovascular: regular rate and rhythm, normal S1/S2, no murmurs, no edema\par GI: soft, non-tender, non-distended, bowel sounds present; no hepatosplenomegaly on palpation\par Musculoskeletal: extremities warm, well-perfused, normal range of motion\par Skin: no rashes, bruises, or lesions\par \par Neurologic Exam:\par Mental Status: awake and alert, speech fluent and prosodic with no paraphasic errors\par Cranial Nerves: I: deferred; II: pupils equal round and reactive, visual fields full to confrontation III, IV, VI: extraocular movements full with no nystagmus; V: facial sensation intact and symmetric; VII: facial power symmetric; VIII: hearing intact to finger rub; IX/X: palate elevates symmetrically, no dysarthria; XI: shoulder shrug symmetric; XII: tongue protrudes midline\par Motor: normal bulk and tone, no orbiting or pronator drift, power 5/5 to confrontation throughout including shoulder abduction, elbow flexion and extension, wrist flexion and extension, hip flexion, knee flexion and extension, plantarflexion, and dorsiflexion, no abnormal movements\par Sensation: intact to light touch in distal upper and lower extremities bilaterally\par Coordination: finger-nose-finger intact bilaterally\par Reflexes: 2+ biceps, triceps, brachioradialis, patella\par Gait: narrow base, normal stance and stride, normal arm swing

## 2020-07-20 NOTE — HISTORY OF PRESENT ILLNESS
[FreeTextEntry1] : 25-year-old woman with left parietal low grade glioma s/p resection in November 2018 c/b focal seizures who presents for follow up of focal epilepsy. \par \par After the surgery she was weak in the right arm and leg for about a week and a half.  She was taking Keppra at the time which made her feel depressed and lethargic.  Within 2 weeks of the surgery she was completely back to baseline neurologically, and felt much better mentally after stopped Keppra.\par \par In June 2019 (about 7 months after the surgery), she had an episode of dizziness (lightheaded, anxious) followed by numbness of the right leg followed by painful shaking of the right leg.  She was able to stand throughout the episode.  No loss or alteration of consciousness.  She was seen by Dr. Cali and started on Lamictal.  Tolerated this without side effects but never refilled the prescription after initially uptitration ran out.\par \par Since then she has had 3 more episodes, all of which felt exactly the same as the first one.  Episodes were a few months apart.  Each episode lasted about 10 seconds.  Most recent episode was this month (March 2020).  Of note her most recent MRI earlier this week shows new FLAIR hyperintensity anterior to the resection cavity which may represent new tumor growth.

## 2020-07-30 ENCOUNTER — APPOINTMENT (OUTPATIENT)
Dept: NEUROLOGY | Facility: CLINIC | Age: 25
End: 2020-07-30
Payer: MEDICAID

## 2020-07-30 VITALS
SYSTOLIC BLOOD PRESSURE: 98 MMHG | HEIGHT: 67 IN | TEMPERATURE: 98.7 F | HEART RATE: 64 BPM | BODY MASS INDEX: 18.05 KG/M2 | OXYGEN SATURATION: 98 % | WEIGHT: 115 LBS | DIASTOLIC BLOOD PRESSURE: 61 MMHG

## 2020-07-30 DIAGNOSIS — F41.9 ANXIETY DISORDER, UNSPECIFIED: ICD-10-CM

## 2020-07-30 PROCEDURE — 99214 OFFICE O/P EST MOD 30 MIN: CPT

## 2020-07-30 RX ORDER — LAMOTRIGINE 50 MG/1
50 TABLET ORAL
Qty: 270 | Refills: 1 | Status: DISCONTINUED | COMMUNITY
Start: 2020-04-06 | End: 2020-07-30

## 2020-07-30 RX ORDER — LAMOTRIGINE 200 MG/1
200 TABLET ORAL DAILY
Qty: 30 | Refills: 1 | Status: DISCONTINUED | COMMUNITY
Start: 2020-07-30 | End: 2020-07-30

## 2020-07-30 NOTE — DATA REVIEWED
[de-identified] : 6/11/2020 - Stable exam. No change in the postoperative appearance of the left parietal resection cavity.  [de-identified] : 7/17/2019 - There were no findings of active epilepsy, however this alone does not rule out the diagnosis

## 2020-07-30 NOTE — DISCUSSION/SUMMARY
[FreeTextEntry1] : Impression:\par 1) Focal epilepsy related to left parietal low grade glioma s/p resection. Increase in amount of seizures since March unsure if related to medication compliance, increase in stress or ineffective or intolerable AED. \par 2) Failed keppra. Possibly intolerance to lamotrigine as well. Currently on 50/100 of lamotrigine with decreased appetite and issues with sleep. \par 3) Last EEG was in June 2019. \par \par Plan: \par 1) Increase to Lamotrigine  mg in the morning.\par 2) Check levels \par 3) Call in one week to discuss if there are any side effects. If tolerable discuss starting lyrica or gabapentin for sleep and pain issues. \par 4) 24 hour Amb EEG \par 5) Possibly inpatient admission VEEG for cross titration to another medication from LTG to possibly aptiom

## 2020-07-30 NOTE — PHYSICAL EXAM
[FreeTextEntry1] : General:\par Constitutional:  Sitting comfortably in NAD.\par Psychiatric: well-groomed, appropriate affect\par Extremities: no edema, clubbing or cyanosis\par Skin: no rash or neuro-cutaneous signs \par \par Cognitive: retention 3/3 at 5 minutes\par Orientation, language, memory and knowledge screens intact.\par \par Cranial Nerves:\par II: ANGELIA. III/IV/VI: EOM Full.  Absent nystagmus\par V1V2V3: Symmetric, VII: Face appears symmetric VIII: Normal to screening  XI: Trapezius Symmetric  XII: Tongue midline\par Motor:\par Power: no pronator drift, power 5/5 distal U/E\par Tone: normal x 4 limbs\par No tremor\par Coordination/Gait:\par Finger-nose intact, normal finger taps and rapid-alternating movements intact\par Narrow based gait\par Reflexes:\par DTR: 2+ symmetric all 4 limbs\par \par

## 2020-07-30 NOTE — HISTORY OF PRESENT ILLNESS
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 \par \par Phylicia presents today complaining of increased episodes but no change in what happens or the episodes duration since March 2020. First seizure was June 2019 about one year after her resection. In June 2019, she was having them about once every 6 months. Starting in March after she started the Lamotrigine her seizures have supposedly increased to one every three weeks. \par \par Most recent episode was two days ago. \par \par The episodes are described as - first getting light headed and dizzy. Then her right leg goes completely numb. She is still able to stand and walk but her leg feels like it is detached from her body. Her right leg starts to shake for less than a minute accompanied by excruciating pain. The pain is described as a cramping feeling. She says during the past four seizures her leg has not been shaking but it is still numb. Instead, the right side of the back is twitching and extremely painful. She can tell in the morning if she is going to have a seizure that day because her leg feels week that morning. She is alert during the seizures and can remember the event after it happens. \par \par After starting the lamotrigine she states her appetite has decreased, she gets nauseous when she eats and has not been sleeping well. \par \par Also complains of a rare feeling in the top of her head that she describes as a "fizzing" and or "tingling" feeling that she also related to the LTG. \par \par Failed AEDs:\par Keppra (Intolerable - spacey feeling and irritable)\par \par PMHx:\par After the surgery she was weak in the right arm and leg for about a week and a half. She was taking Keppra at the time which made her feel depressed and lethargic. Within 2 weeks of the surgery she was completely back to baseline neurologically, and felt much better mentally after stopped Keppra.\par \par In June 2019 (about 7 months after the surgery), she had an episode of dizziness (lightheaded, anxious) followed by numbness of the right leg followed by painful shaking of the right leg. She was able to stand throughout the episode. No loss or alteration of consciousness. She was seen by Dr. Cali and started on Lamictal. Tolerated this without side effects but never refilled the prescription after initially uptitration ran out.\par \par Since then she has had 3 more episodes, all of which felt exactly the same as the first one. Episodes were a few months apart. Each episode lasted about 10 seconds. Most recent episode was this month (March 2020). Of note her most recent MRI earlier this week shows new FLAIR hyperintensity anterior to the resection cavity which may represent new tumor growth

## 2020-09-08 ENCOUNTER — APPOINTMENT (OUTPATIENT)
Dept: NEUROLOGY | Facility: CLINIC | Age: 25
End: 2020-09-08
Payer: MEDICAID

## 2020-09-08 PROCEDURE — 95819 EEG AWAKE AND ASLEEP: CPT

## 2020-09-09 ENCOUNTER — APPOINTMENT (OUTPATIENT)
Dept: NEUROLOGY | Facility: CLINIC | Age: 25
End: 2020-09-09

## 2020-09-09 PROCEDURE — 95700 EEG CONT REC W/VID EEG TECH: CPT

## 2020-09-09 PROCEDURE — 95719 EEG PHYS/QHP EA INCR W/O VID: CPT

## 2020-09-09 PROCEDURE — 95708 EEG WO VID EA 12-26HR UNMNTR: CPT

## 2020-09-18 ENCOUNTER — APPOINTMENT (OUTPATIENT)
Dept: MRI IMAGING | Facility: CLINIC | Age: 25
End: 2020-09-18
Payer: MEDICAID

## 2020-09-18 ENCOUNTER — OUTPATIENT (OUTPATIENT)
Dept: OUTPATIENT SERVICES | Facility: HOSPITAL | Age: 25
LOS: 1 days | End: 2020-09-18

## 2020-09-18 DIAGNOSIS — Z98.890 OTHER SPECIFIED POSTPROCEDURAL STATES: Chronic | ICD-10-CM

## 2020-09-18 PROCEDURE — 70553 MRI BRAIN STEM W/O & W/DYE: CPT | Mod: 26

## 2020-10-02 ENCOUNTER — RX RENEWAL (OUTPATIENT)
Age: 25
End: 2020-10-02

## 2020-10-16 ENCOUNTER — APPOINTMENT (OUTPATIENT)
Dept: NEUROLOGY | Facility: CLINIC | Age: 25
End: 2020-10-16
Payer: MEDICAID

## 2020-10-16 PROCEDURE — 99214 OFFICE O/P EST MOD 30 MIN: CPT | Mod: 95

## 2020-10-16 NOTE — REASON FOR VISIT
[Home] : at home, [unfilled] , at the time of the visit. [Medical Office: ([facility])___] : at the medical office located in  [Verbal consent obtained from patient] : the patient, [unfilled] [Follow-Up: _____] : a [unfilled] follow-up visit

## 2020-10-16 NOTE — PHYSICAL EXAM
[FreeTextEntry1] : \par \par General:\par Constitutional:  Sitting comfortably in NAD.\par Psychiatric: well-groomed, appropriate affect, insight/judgment intact\par \par Cognitive:\par Orientation, language, memory and knowledge screens intact.\par No expressive or receptive errors.\par \par Cranial Nerves:\par VII: Face appears symmetric \par \par

## 2020-10-16 NOTE — DISCUSSION/SUMMARY
[FreeTextEntry1] : Impression:\par 1) Phylicia is a 24 yo RHF originally from [place], with history of left parietal low grade glioma, WHO II, IDH mutant, s/p resection Oct 29, 2018.  The tumor was sizable, with growth known over the past 9 years after being found as an incidental finding.  There were no known convulsive or non-convulsive seizures.  Levetiracetam was initiated immediately, but mood was awful post-operatively, persisted after dexamethasone was reduced, and improved after coming off LEV, and switched to lamotrigine, but at 250mg/d, not affecting seizure type or frequency.  So will push up dose until effective or not tolerated, then consider other options.\par \par Plan:\par 1) increase lamotrigine by 100mg to 350 x 1 month, assess seizure freq, then to 450 if still occurring and no adverse effects.\par 2) levels, screening labs - she will come to [address] [place] [address].\par

## 2020-10-16 NOTE — HISTORY OF PRESENT ILLNESS
[FreeTextEntry1] : \jose Whatley is a 24 yo RHF for re-evaluation for tumor and seizure.\par Resected 2018 Pathology: Low Grade Glioma with focal calcification, IDH mutant, WHO Grade II, Non-methylated, negative for co-deletion.  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  Duration is 10 seconds or less, but very painful contraction in the right leg and right side of her back.  Some also in the shoulder region.  She is conscious, and does not fall as she has some type of warning - lightheaded and leg goes numb first, allowing her to find a place to sit.  No post-seizure symptoms.\par \par Lamotrigine 250mg/d currently.  Without any change from its use, not better, not worse.\par ambEEG last month - seizure occurred at 1:29am Sept 9, but not reported in the EEG.\par \par Failed: LEV may or may not have worsened mood etc. was only on it for a month, but was angry and had mood swings.\par \par \par \par She was seen here Nov 28 2018, and we decided it would be reasonable to stop leveitracetam.  She was not tolerating it well, in terms of mood, edgy and very tired.\par \par She had noticed after surgery Oct 2018, the right arm and left felt weak after, and effortful.  She found her spatial processing and hand-eye coordination using the right hand.  She noticed after stopping levetiracetam she seemed to improve to 100%.\par \par However, about 1.5 weeks ago, she awoke feeling anxious.  At work, she felt her right leg feel weak, similar to after the surgery.  It was trembling a bit, but she was walking on it.  Within seconds she also felt it in the right arm, and felt a pulling of her shoulder/arm back, and she saw her right arm shake, and the leg shake as well.  She had no confusion and was able to call Dr. Gardiner during the event.  He sent some levetiracetam, but took one pill and felt horrible again, and stopped taking it again.\par \par \par \par Prior:\par When she was 14 years old she fell off a horse, and an MRI was done, which showed an incidental tumor.  Her neurosurgeon in [place] suggested following the tumor every 6 months.  It did not grow for many years but in the past few years there were some minimal changes, last MRI done in 2016.\par \par No seizures, and never felt 'weird'.\par \par In 2018, the first MRI in [place] showed 2cm growth from 2011 films, measuring, 4.6 x 3.5 x 3.4cm, left parietal at the left. \par \par Post-operatively, she had a weird reaction, not fully recognizing that people on the right were really there on the right, and some mild motor problems.  The scar is barely notable, and she is very pleased with the results.\par \par Levetiracetam was started in the post-operative time period, just in case, though no seizures.  She had been on steroids for the week after as well, and felt high the entire time.\par \par For 2 weeks she has been off the LEV.  The rEEG Nov 20th showed only slowing in the area of surgery.\par \par

## 2020-10-16 NOTE — REASON FOR VISIT
[Home] : at home, [unfilled] , at the time of the visit. [Medical Office: ([facility])___] : at the medical office located in  [Verbal consent obtained from patient] : the patient, [unfilled] [Follow-Up: _____] : a [unfilled] follow-up visit

## 2020-10-16 NOTE — DISCUSSION/SUMMARY
[FreeTextEntry1] : Impression:\par 1) Phylicia is a 24 yo RHF originally from [place], with history of left parietal low grade glioma, WHO II, IDH mutant, s/p resection Oct 29, 2018.  The tumor was sizable, with growth known over the past 9 years after being found as an incidental finding.  There were no known convulsive or non-convulsive seizures.  Levetiracetam was initiated immediately, but mood was awful post-operatively, persisted after dexamethasone was reduced, and improved after coming off LEV, and switched to lamotrigine, but at 250mg/d, not affecting seizure type or frequency.  So will push up dose until effective or not tolerated, then consider other options.\par \par Plan:\par 1) increase lamotrigine by 100mg to 350 x 1 month, assess seizure freq, then to 450 if still occurring and no adverse effects.\par 2) levels, screening labs - she will come to [address] [place] [address].\par

## 2020-10-16 NOTE — HISTORY OF PRESENT ILLNESS
[FreeTextEntry1] : \jose Whatley is a 24 yo RHF for re-evaluation for tumor and seizure.\par Resected 2018 Pathology: Low Grade Glioma with focal calcification, IDH mutant, WHO Grade II, Non-methylated, negative for co-deletion.  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  Duration is 10 seconds or less, but very painful contraction in the right leg and right side of her back.  Some also in the shoulder region.  She is conscious, and does not fall as she has some type of warning - lightheaded and leg goes numb first, allowing her to find a place to sit.  No post-seizure symptoms.\par \par Lamotrigine 250mg/d currently.  Without any change from its use, not better, not worse.\par ambEEG last month - seizure occurred at 1:29am Sept 9, but not reported in the EEG.\par \par Failed: LEV may or may not have worsened mood etc. was only on it for a month, but was angry and had mood swings.\par \par \par \par She was seen here Nov 28 2018, and we decided it would be reasonable to stop leveitracetam.  She was not tolerating it well, in terms of mood, edgy and very tired.\par \par She had noticed after surgery Oct 2018, the right arm and left felt weak after, and effortful.  She found her spatial processing and hand-eye coordination using the right hand.  She noticed after stopping levetiracetam she seemed to improve to 100%.\par \par However, about 1.5 weeks ago, she awoke feeling anxious.  At work, she felt her right leg feel weak, similar to after the surgery.  It was trembling a bit, but she was walking on it.  Within seconds she also felt it in the right arm, and felt a pulling of her shoulder/arm back, and she saw her right arm shake, and the leg shake as well.  She had no confusion and was able to call Dr. Gardiner during the event.  He sent some levetiracetam, but took one pill and felt horrible again, and stopped taking it again.\par \par \par \par Prior:\par When she was 14 years old she fell off a horse, and an MRI was done, which showed an incidental tumor.  Her neurosurgeon in [place] suggested following the tumor every 6 months.  It did not grow for many years but in the past few years there were some minimal changes, last MRI done in 2016.\par \par No seizures, and never felt 'weird'.\par \par In 2018, the first MRI in [place] showed 2cm growth from 2011 films, measuring, 4.6 x 3.5 x 3.4cm, left parietal at the left. \par \par Post-operatively, she had a weird reaction, not fully recognizing that people on the right were really there on the right, and some mild motor problems.  The scar is barely notable, and she is very pleased with the results.\par \par Levetiracetam was started in the post-operative time period, just in case, though no seizures.  She had been on steroids for the week after as well, and felt high the entire time.\par \par For 2 weeks she has been off the LEV.  The rEEG Nov 20th showed only slowing in the area of surgery.\par \par

## 2020-12-10 ENCOUNTER — APPOINTMENT (OUTPATIENT)
Dept: NEUROLOGY | Facility: CLINIC | Age: 25
End: 2020-12-10
Payer: MEDICAID

## 2020-12-10 VITALS
DIASTOLIC BLOOD PRESSURE: 86 MMHG | BODY MASS INDEX: 18.21 KG/M2 | HEIGHT: 67 IN | SYSTOLIC BLOOD PRESSURE: 128 MMHG | TEMPERATURE: 98.4 F | OXYGEN SATURATION: 99 % | WEIGHT: 116 LBS | HEART RATE: 85 BPM

## 2020-12-10 PROCEDURE — 99072 ADDL SUPL MATRL&STAF TM PHE: CPT

## 2020-12-10 PROCEDURE — 99215 OFFICE O/P EST HI 40 MIN: CPT

## 2020-12-10 RX ORDER — LAMOTRIGINE 100 MG/1
100 TABLET, EXTENDED RELEASE ORAL
Qty: 60 | Refills: 2 | Status: COMPLETED | COMMUNITY
Start: 2020-10-16 | End: 2020-12-10

## 2020-12-10 NOTE — DISCUSSION/SUMMARY
[FreeTextEntry1] : Impression:\par 1) Phylicai is a 26 yo RHF originally from [place], with history of left parietal low grade glioma, WHO II, IDH mutant, s/p resection Oct 29, 2018.  The tumor was sizable, with growth known over the past 9 years after being found as an incidental finding.  \par \par Plan:\par 1) LTG level today\par 2) push LTG ER 500mg/d - patient tld of potential side-effects

## 2020-12-10 NOTE — HISTORY OF PRESENT ILLNESS
[FreeTextEntry1] : \jose Whatley is a 26 yo RHF for re-evaluation for tumor and seizure.\par \par Seizures recurred 1.5 years ago.\par Each seizure has not changed, but the frequency has slowly increased steadily, now every 2 weeks, duration 10-30s.  Painful numbness in the thigh, then intense pain then contraction.  May still have a warning, and able to sit down.  Others may not notice if they were around.\par It is weird that it is now only on a Tues or Weds.\par Caffeine may trigger it.\par \par She is on lamotrigine, but with dosing issues and prescribing, is now only on 250mg/d, and 300mg/d also unhelpful.\par \par She is a 'naturally anxious person'.  Finds it hard to sleep, thinking too much, nati about film and writing.  Tends to be very productive mostly at nights.\par jorgito Has an MRI in another week to track the WHO II left posterior parietal tumor.\par \par Resected 2018 Pathology: Low Grade Glioma with focal calcification, IDH mutant, WHO Grade II, Non-methylated, negative for co-deletion.  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  Duration is 10 seconds or less, but very painful contraction in the right leg and right side of her back.  Some also in the shoulder region.  She is conscious, and does not fall as she has some type of warning - lightheaded and leg goes numb first, allowing her to find a place to sit.  No post-seizure symptoms.\par \par Lamotrigine 250mg/d currently.  Without any change from its use, not better, not worse.\par ambEEG last month - seizure occurred at 1:29am Sept 9, but not reported in the EEG.\par \par Failed: LEV may or may not have worsened mood etc. was only on it for a month, but was angry and had mood swings.\par \par Nov 28 2018, and we decided it would be reasonable to stop leveitracetam.  She was not tolerating it well, in terms of mood, edgy and very tired.\par \par She had noticed after surgery Oct 2018, the right arm and left felt weak after, and effortful.  She found her spatial processing and hand-eye coordination using the right hand.  She noticed after stopping levetiracetam she seemed to improve to 100%.\par \par However, about 1.5 weeks ago, she awoke feeling anxious.  At work, she felt her right leg feel weak, similar to after the surgery.  It was trembling a bit, but she was walking on it.  Within seconds she also felt it in the right arm, and felt a pulling of her shoulder/arm back, and she saw her right arm shake, and the leg shake as well.  She had no confusion and was able to call Dr. Gardiner during the event.  He sent some levetiracetam, but took one pill and felt horrible again, and stopped taking it again.\par \par \par \par Prior:\par When she was 14 years old she fell off a horse, and an MRI was done, which showed an incidental tumor.  Her neurosurgeon in [place] suggested following the tumor every 6 months.  It did not grow for many years but in the past few years there were some minimal changes, last MRI done in 2016.\par \par No seizures, and never felt 'weird'.\par \par In 2018, the first MRI in [place] showed 2cm growth from 2011 films, measuring, 4.6 x 3.5 x 3.4cm, left parietal at the left. \par \par Post-operatively, she had a weird reaction, not fully recognizing that people on the right were really there on the right, and some mild motor problems.  The scar is barely notable, and she is very pleased with the results.\par \par Levetiracetam was started in the post-operative time period, just in case, though no seizures.  She had been on steroids for the week after as well, and felt high the entire time.\par \par For 2 weeks she has been off the LEV.  The rEEG Nov 20th showed only slowing in the area of surgery.\par \par

## 2020-12-10 NOTE — PHYSICAL EXAM
[FreeTextEntry1] : General:\par Constitutional:  Sitting comfortably in NAD.\par Psychiatric: well-groomed, appropriate affect\par Ears, Nose, Throat: no abnormalities, mucus membranes moist\par Neck: supple\par Extremities: no edema, clubbing or cyanosis\par Skin: no rash or neuro-cutaneous signs \par \par Cognitive:\par Orientation, language, memory and knowledge screens intact.\par \par Cranial Nerves:\par II: ANGELIA. III/IV/VI: EOM Full.  smooth pursuit\par VII: Face appears symmetric VIII: Normal to screening\par IX/X: normal phonation  XI: Trapezius Symmetric  XII: Tongue midline\par Motor:\par Power: no pronator drift\par \par No termor\par Narrow based gait\par

## 2020-12-11 VITALS
RESPIRATION RATE: 16 BRPM | DIASTOLIC BLOOD PRESSURE: 81 MMHG | HEART RATE: 83 BPM | OXYGEN SATURATION: 96 % | SYSTOLIC BLOOD PRESSURE: 120 MMHG | TEMPERATURE: 98 F | WEIGHT: 115.96 LBS | HEIGHT: 67 IN

## 2020-12-11 NOTE — ED ADULT NURSE NOTE - CHIEF COMPLAINT QUOTE
Pt with hx brain tumor surgery. BIBA after partial seizure at home. A&Ox3 ambulatory with steady gait in ED.  Told by brain surgeon to report to ED

## 2020-12-11 NOTE — ED ADULT NURSE NOTE - OBJECTIVE STATEMENT
26 yo F c/o seizures. Pt reports she was eating dinner when she felt "dizzy, her vision went white, and she felt as though she was going to faint/have a seizure". Pt has hx of brain tumor and focal seizures to the R side. Denies fall, injuries, pain. Denies CP, SOB, N/V/D, headache, dizziness, fever/chills, numbness/tingling, change in bowel or bladder habits. Pt speaking in full complete sentences, ambulatory with steady gait.

## 2020-12-12 ENCOUNTER — TRANSCRIPTION ENCOUNTER (OUTPATIENT)
Age: 25
End: 2020-12-12

## 2020-12-12 ENCOUNTER — INPATIENT (INPATIENT)
Facility: HOSPITAL | Age: 25
LOS: 0 days | Discharge: HOME | DRG: 101 | End: 2020-12-12
Attending: NEUROLOGICAL SURGERY | Admitting: NEUROLOGICAL SURGERY
Payer: MEDICAID

## 2020-12-12 VITALS
TEMPERATURE: 98 F | OXYGEN SATURATION: 98 % | HEART RATE: 89 BPM | RESPIRATION RATE: 16 BRPM | DIASTOLIC BLOOD PRESSURE: 71 MMHG | SYSTOLIC BLOOD PRESSURE: 124 MMHG

## 2020-12-12 DIAGNOSIS — Z98.890 OTHER SPECIFIED POSTPROCEDURAL STATES: Chronic | ICD-10-CM

## 2020-12-12 LAB
ALBUMIN SERPL ELPH-MCNC: 4.3 G/DL — SIGNIFICANT CHANGE UP (ref 3.4–5)
ALP SERPL-CCNC: 72 U/L — SIGNIFICANT CHANGE UP (ref 40–120)
ALT FLD-CCNC: 17 U/L — SIGNIFICANT CHANGE UP (ref 12–42)
ANION GAP SERPL CALC-SCNC: 8 MMOL/L — LOW (ref 9–16)
AST SERPL-CCNC: 18 U/L — SIGNIFICANT CHANGE UP (ref 15–37)
BASOPHILS # BLD AUTO: 0.06 K/UL — SIGNIFICANT CHANGE UP (ref 0–0.2)
BASOPHILS NFR BLD AUTO: 0.7 % — SIGNIFICANT CHANGE UP (ref 0–2)
BILIRUB SERPL-MCNC: 0.3 MG/DL — SIGNIFICANT CHANGE UP (ref 0.2–1.2)
BUN SERPL-MCNC: 12 MG/DL — SIGNIFICANT CHANGE UP (ref 7–23)
CALCIUM SERPL-MCNC: 9.7 MG/DL — SIGNIFICANT CHANGE UP (ref 8.5–10.5)
CHLORIDE SERPL-SCNC: 104 MMOL/L — SIGNIFICANT CHANGE UP (ref 96–108)
CO2 SERPL-SCNC: 31 MMOL/L — SIGNIFICANT CHANGE UP (ref 22–31)
CREAT SERPL-MCNC: 0.76 MG/DL — SIGNIFICANT CHANGE UP (ref 0.5–1.3)
EOSINOPHIL # BLD AUTO: 0.34 K/UL — SIGNIFICANT CHANGE UP (ref 0–0.5)
EOSINOPHIL NFR BLD AUTO: 4.1 % — SIGNIFICANT CHANGE UP (ref 0–6)
GLUCOSE SERPL-MCNC: 95 MG/DL — SIGNIFICANT CHANGE UP (ref 70–99)
HCG SERPL-ACNC: <1 MIU/ML — SIGNIFICANT CHANGE UP
HCT VFR BLD CALC: 38 % — SIGNIFICANT CHANGE UP (ref 34.5–45)
HGB BLD-MCNC: 11.9 G/DL — SIGNIFICANT CHANGE UP (ref 11.5–15.5)
IMM GRANULOCYTES NFR BLD AUTO: 0.2 % — SIGNIFICANT CHANGE UP (ref 0–1.5)
LYMPHOCYTES # BLD AUTO: 2.36 K/UL — SIGNIFICANT CHANGE UP (ref 1–3.3)
LYMPHOCYTES # BLD AUTO: 28.6 % — SIGNIFICANT CHANGE UP (ref 13–44)
MAGNESIUM SERPL-MCNC: 2.2 MG/DL — SIGNIFICANT CHANGE UP (ref 1.6–2.6)
MCHC RBC-ENTMCNC: 29.9 PG — SIGNIFICANT CHANGE UP (ref 27–34)
MCHC RBC-ENTMCNC: 31.3 GM/DL — LOW (ref 32–36)
MCV RBC AUTO: 95.5 FL — SIGNIFICANT CHANGE UP (ref 80–100)
MONOCYTES # BLD AUTO: 0.46 K/UL — SIGNIFICANT CHANGE UP (ref 0–0.9)
MONOCYTES NFR BLD AUTO: 5.6 % — SIGNIFICANT CHANGE UP (ref 2–14)
NEUTROPHILS # BLD AUTO: 5 K/UL — SIGNIFICANT CHANGE UP (ref 1.8–7.4)
NEUTROPHILS NFR BLD AUTO: 60.8 % — SIGNIFICANT CHANGE UP (ref 43–77)
NRBC # BLD: 0 /100 WBCS — SIGNIFICANT CHANGE UP (ref 0–0)
PLATELET # BLD AUTO: 322 K/UL — SIGNIFICANT CHANGE UP (ref 150–400)
POTASSIUM SERPL-MCNC: 3.9 MMOL/L — SIGNIFICANT CHANGE UP (ref 3.5–5.3)
POTASSIUM SERPL-SCNC: 3.9 MMOL/L — SIGNIFICANT CHANGE UP (ref 3.5–5.3)
PROT SERPL-MCNC: 8.2 G/DL — SIGNIFICANT CHANGE UP (ref 6.4–8.2)
RBC # BLD: 3.98 M/UL — SIGNIFICANT CHANGE UP (ref 3.8–5.2)
RBC # FLD: 14 % — SIGNIFICANT CHANGE UP (ref 10.3–14.5)
SARS-COV-2 RNA SPEC QL NAA+PROBE: SIGNIFICANT CHANGE UP
SODIUM SERPL-SCNC: 143 MMOL/L — SIGNIFICANT CHANGE UP (ref 132–145)
TROPONIN I SERPL-MCNC: <0.017 NG/ML — LOW (ref 0.02–0.06)
WBC # BLD: 8.24 K/UL — SIGNIFICANT CHANGE UP (ref 3.8–10.5)
WBC # FLD AUTO: 8.24 K/UL — SIGNIFICANT CHANGE UP (ref 3.8–10.5)

## 2020-12-12 PROCEDURE — 84484 ASSAY OF TROPONIN QUANT: CPT

## 2020-12-12 PROCEDURE — 93005 ELECTROCARDIOGRAM TRACING: CPT

## 2020-12-12 PROCEDURE — 80175 DRUG SCREEN QUAN LAMOTRIGINE: CPT

## 2020-12-12 PROCEDURE — 85025 COMPLETE CBC W/AUTO DIFF WBC: CPT

## 2020-12-12 PROCEDURE — 84702 CHORIONIC GONADOTROPIN TEST: CPT

## 2020-12-12 PROCEDURE — 83735 ASSAY OF MAGNESIUM: CPT

## 2020-12-12 PROCEDURE — 36415 COLL VENOUS BLD VENIPUNCTURE: CPT

## 2020-12-12 PROCEDURE — 99285 EMERGENCY DEPT VISIT HI MDM: CPT | Mod: 25

## 2020-12-12 PROCEDURE — 82962 GLUCOSE BLOOD TEST: CPT

## 2020-12-12 PROCEDURE — 70450 CT HEAD/BRAIN W/O DYE: CPT | Mod: 26

## 2020-12-12 PROCEDURE — 80053 COMPREHEN METABOLIC PANEL: CPT

## 2020-12-12 PROCEDURE — 87635 SARS-COV-2 COVID-19 AMP PRB: CPT

## 2020-12-12 PROCEDURE — 90471 IMMUNIZATION ADMIN: CPT

## 2020-12-12 PROCEDURE — 99222 1ST HOSP IP/OBS MODERATE 55: CPT

## 2020-12-12 PROCEDURE — 70450 CT HEAD/BRAIN W/O DYE: CPT

## 2020-12-12 PROCEDURE — 99285 EMERGENCY DEPT VISIT HI MDM: CPT

## 2020-12-12 PROCEDURE — 90686 IIV4 VACC NO PRSV 0.5 ML IM: CPT

## 2020-12-12 RX ORDER — SODIUM CHLORIDE 9 MG/ML
1000 INJECTION INTRAMUSCULAR; INTRAVENOUS; SUBCUTANEOUS
Refills: 0 | Status: DISCONTINUED | OUTPATIENT
Start: 2020-12-12 | End: 2020-12-12

## 2020-12-12 RX ORDER — LAMOTRIGINE 25 MG/1
500 TABLET, ORALLY DISINTEGRATING ORAL
Qty: 0 | Refills: 0 | DISCHARGE

## 2020-12-12 RX ORDER — LAMOTRIGINE 25 MG/1
500 TABLET, ORALLY DISINTEGRATING ORAL ONCE
Refills: 0 | Status: COMPLETED | OUTPATIENT
Start: 2020-12-12 | End: 2020-12-12

## 2020-12-12 RX ORDER — INFLUENZA VIRUS VACCINE 15; 15; 15; 15 UG/.5ML; UG/.5ML; UG/.5ML; UG/.5ML
0.5 SUSPENSION INTRAMUSCULAR ONCE
Refills: 0 | Status: COMPLETED | OUTPATIENT
Start: 2020-12-12 | End: 2020-12-12

## 2020-12-12 RX ADMIN — LAMOTRIGINE 500 MILLIGRAM(S): 25 TABLET, ORALLY DISINTEGRATING ORAL at 11:51

## 2020-12-12 RX ADMIN — INFLUENZA VIRUS VACCINE 0.5 MILLILITER(S): 15; 15; 15; 15 SUSPENSION INTRAMUSCULAR at 11:35

## 2020-12-12 NOTE — DISCHARGE NOTE PROVIDER - HOSPITAL COURSE
H&P taken from her 2018 admission:  Phylicia Dyer is a 25-year-old right handed female with history of incidentally found left parietal brain tumor found on MRI imaging. This was initially discovered in 2014 and remained asymptomatic. The lesion grew in size in 2016 and continued to grow since last MRI 4/18. She continues to deny headaches, vision changes or gait changes. She underwent a left craniotomy for resection of this suspected low grade lesion on 10/29/18.     Today, patient presents to the ED after concerns of dizziness yesterday 12/11/2020 after not eating food all day and in addition increasing her Lamotrigine dose from 300mg to 500mg. The patient became increasingly concerned due to her history of diffuse low-grade astrocytoma and surgery for resection in the past and decided to come to [facility] for further evaluation.

## 2020-12-12 NOTE — ED PROVIDER NOTE - CLINICAL SUMMARY MEDICAL DECISION MAKING FREE TEXT BOX
plan for mri, transfer to [facility], pending work up, labs, ekg and trop. do not suspect PE.

## 2020-12-12 NOTE — ED ADULT NURSE REASSESSMENT NOTE - NS ED NURSE REASSESS COMMENT FT1
Pt resting comfortably in stretcher, updated on plan of care. EMS reports the ETA may be an hour or so from now. Pt denies pain, breathing spontaneous and nonlabored.

## 2020-12-12 NOTE — H&P ADULT - NSHPPHYSICALEXAM_GEN_ALL_CORE
General: Laying in bed. NAD  HEENT: PERRL. EOMI bilaterally.  Neck: Supple  Lungs: CTAB.   Heart: RRR. S1, S2  Abdomen: Soft, NT ND +BS  Extremities: 2+ pulses throughout, perfusing well  Neuro: AAO x3, CN II-XII intact. Full 5/5 strength upper and lower extremities throughout, Nml finger-nose-finger, no dymetria, no pronator drift, SLIT

## 2020-12-12 NOTE — DISCHARGE NOTE PROVIDER - NSDCFUADDINST_GEN_ALL_CORE_FT
Patient stated she has ample supple of her Lamotrigine and was instructed to continue taking her home dose. It is encouraged that you eat regular meals throughout the day.  Patient stated she has ample supple of her Lamotrigine and was instructed to continue taking her home dose. Patient stated this is the only medication she is currently taking. It is encouraged that you eat regular meals throughout the day.     Please return to the ED for any recurrent or worsening of symptoms.

## 2020-12-12 NOTE — ED PROVIDER NOTE - OBJECTIVE STATEMENT
24 yo F, hx of L parietal lobe mass removal and craniotomy in 2018, on lamotrigine recently had dose increased to 500 mg last evening, presents s/p near syncopal episode. patient notes while eating dinner, felt lightheaded, dizziness, and nearly passed out.

## 2020-12-12 NOTE — H&P ADULT - REASON FOR ADMISSION
Patient complaining of weakness, dizziness yesterday 12/11 after increasing her Lamotrigine dose and not eating all day

## 2020-12-12 NOTE — DISCHARGE NOTE PROVIDER - CARE PROVIDER_API CALL
Jermaine Gardiner  NEUROSURGERY  [address] [place] [address]  Phone: [phone]  Fax: [phone]  Follow Up Time:

## 2020-12-12 NOTE — ED PROVIDER NOTE - PROGRESS NOTE DETAILS
spoke to dr burch, covering jennifer celis from epilepsy, can see patient in consultation if necessary, although lamotrigine was changed yesterday. patient can also follow up outpatient. spoke to dr d'amico neurosurgery, patient can follow up outpatient or be transferred to his service.

## 2020-12-12 NOTE — DISCHARGE NOTE PROVIDER - NSDCFUADDAPPT_GEN_ALL_CORE_FT
You will follow-up with Dr. Gardiner in office. Please contact his office for further appointment instructions.

## 2020-12-12 NOTE — ED ADULT NURSE REASSESSMENT NOTE - NS ED NURSE REASSESS COMMENT FT1
Pt received from ARTIS Kruse. Pt resting comfortably in stretcher, updated on plan of care. Denies pain, breathing spontaneous and nonlabored. Awaiting transport to [facility].

## 2020-12-12 NOTE — H&P ADULT - HISTORY OF PRESENT ILLNESS
H&P taken from her 2018 admission:  Phylicia Dyer is a 25-year-old right handed female with history of incidentally found left parietal brain tumor found on MRI imaging. This was initially discovered in 2014 and remained asymptomatic. The lesion grew in size in 2016 and continued to grow since last MRI 4/18. She continues to deny headaches, vision changes or gait changes. She underwent a left craniotomy for resection of this suspected low grade lesion on 10/29/18.     Today, patient presents to the ED after concerns of dizziness yesterday 12/11/2020 after not eating food all day and in addition increasing her Lamotrigine dose from 300mg to 500mg    H&P taken from her 2018 admission:  Phylicia Dyer is a 25-year-old right handed female with history of incidentally found left parietal brain tumor found on MRI imaging. This was initially discovered in 2014 and remained asymptomatic. The lesion grew in size in 2016 and continued to grow since last MRI 4/18. She continues to deny headaches, vision changes or gait changes. She underwent a left craniotomy for resection of this suspected low grade lesion on 10/29/18.     Today, patient presents to the ED after concerns of dizziness yesterday 12/11/2020 after not eating food all day and in addition increasing her Lamotrigine dose from 300mg to 500mg. The patient became increasingly concerned due to her history of diffuse low-grade astrocytoma and surgery for resection in the past and decided to come to [facility] for further evaluation.

## 2020-12-12 NOTE — DISCHARGE NOTE NURSING/CASE MANAGEMENT/SOCIAL WORK - PATIENT PORTAL LINK FT
You can access the FollowMyHealth Patient Portal offered by [facility] by registering at the following website: [email or web address]. By joining [employer]’s FollowMyHealth portal, you will also be able to view your health information using other applications (apps) compatible with our system.

## 2020-12-12 NOTE — H&P ADULT - ASSESSMENT
This is a 25 year old female hx of diffuse low grade astrocytoma resected 10/29/18. Patient stated her Lamotrigine was increased yesterday from 300 to 500mg and she did not eat well yesterday and therefore felt weak and dizzy and became concerned about her surgical history.     Plan:  -Recommend MRI and EEG set up for observation of seizures  -Patient refused EEG and did not want to stay overnight  -Patient will follow-up as an outpatient to see her surgeon Dr. Gardiner for possible MRI

## 2020-12-12 NOTE — DISCHARGE NOTE PROVIDER - NSDCMRMEDTOKEN_GEN_ALL_CORE_FT
Absorica 10 mg oral capsule: 1 cap(s) orally 2 times a day   acetaminophen 325 mg oral tablet: 2 tab(s) orally every 6 hours, As needed, Mild Pain (1 - 3)  dexamethasone 1 mg oral tablet: 2mg orally 2 times a day x2 days, then 2mg orally once a day x2 days, then 1mg orally once a day x1day  lamoTRIgine: 500 milligram(s) orally once a day  levETIRAcetam 500 mg oral tablet: 1 tab(s) orally every 12 hours  pantoprazole 40 mg oral delayed release tablet: 1 tab(s) orally once a day (before a meal)

## 2020-12-12 NOTE — DISCHARGE NOTE PROVIDER - NSDCCPCAREPLAN_GEN_ALL_CORE_FT
PRINCIPAL DISCHARGE DIAGNOSIS  Diagnosis: Seizure  Assessment and Plan of Treatment: This is a patient who had a diffuse low-grade astrocytoma resection in October 2018. Patient stated her Lamotrigine dose was increased yesterday and she did not eat yesterday and felt dizzy and weak. Patient refusing MRI/EEG      SECONDARY DISCHARGE DIAGNOSES  Diagnosis: Glioma of brain  Assessment and Plan of Treatment:

## 2020-12-14 LAB
ALBUMIN SERPL ELPH-MCNC: 4.8 G/DL
ALP BLD-CCNC: 75 U/L
ALT SERPL-CCNC: 10 U/L
ANA SER IF-ACNC: NEGATIVE
ANION GAP SERPL CALC-SCNC: 10 MMOL/L
AST SERPL-CCNC: 16 U/L
BASOPHILS # BLD AUTO: 0.06 K/UL
BASOPHILS NFR BLD AUTO: 1.2 %
BILIRUB SERPL-MCNC: <0.2 MG/DL
BUN SERPL-MCNC: 14 MG/DL
CALCIUM SERPL-MCNC: 10.1 MG/DL
CHLORIDE SERPL-SCNC: 103 MMOL/L
CO2 SERPL-SCNC: 26 MMOL/L
CREAT SERPL-MCNC: 0.83 MG/DL
EOSINOPHIL # BLD AUTO: 0.18 K/UL
EOSINOPHIL NFR BLD AUTO: 3.7 %
GLUCOSE SERPL-MCNC: 86 MG/DL
HCT VFR BLD CALC: 40.3 %
HGB BLD-MCNC: 12.4 G/DL
IMM GRANULOCYTES NFR BLD AUTO: 0.2 %
LYMPHOCYTES # BLD AUTO: 1.33 K/UL
LYMPHOCYTES NFR BLD AUTO: 27 %
MAN DIFF?: NORMAL
MCHC RBC-ENTMCNC: 30.2 PG
MCHC RBC-ENTMCNC: 30.8 GM/DL
MCV RBC AUTO: 98.3 FL
MONOCYTES # BLD AUTO: 0.32 K/UL
MONOCYTES NFR BLD AUTO: 6.5 %
NEUTROPHILS # BLD AUTO: 3.03 K/UL
NEUTROPHILS NFR BLD AUTO: 61.4 %
PLATELET # BLD AUTO: 303 K/UL
POTASSIUM SERPL-SCNC: 4.5 MMOL/L
PROT SERPL-MCNC: 7.7 G/DL
RBC # BLD: 4.1 M/UL
RBC # FLD: 14.6 %
SODIUM SERPL-SCNC: 138 MMOL/L
THYROGLOB AB SERPL-ACNC: <20 IU/ML
THYROPEROXIDASE AB SERPL IA-ACNC: 12 IU/ML
WBC # FLD AUTO: 4.93 K/UL

## 2020-12-16 LAB
LAMOTRIGINE SERPL-MCNC: 4.6 UG/ML
LAMOTRIGINE SERPL-MCNC: 5.5 UG/ML — SIGNIFICANT CHANGE UP (ref 2–20)

## 2020-12-17 ENCOUNTER — OUTPATIENT (OUTPATIENT)
Dept: OUTPATIENT SERVICES | Facility: HOSPITAL | Age: 25
LOS: 1 days | End: 2020-12-17

## 2020-12-17 ENCOUNTER — APPOINTMENT (OUTPATIENT)
Dept: MRI IMAGING | Facility: CLINIC | Age: 25
End: 2020-12-17
Payer: MEDICAID

## 2020-12-17 DIAGNOSIS — Z98.890 OTHER SPECIFIED POSTPROCEDURAL STATES: Chronic | ICD-10-CM

## 2020-12-17 PROCEDURE — 70553 MRI BRAIN STEM W/O & W/DYE: CPT | Mod: 26

## 2020-12-18 DIAGNOSIS — Z88.2 ALLERGY STATUS TO SULFONAMIDES: ICD-10-CM

## 2020-12-18 DIAGNOSIS — R56.9 UNSPECIFIED CONVULSIONS: ICD-10-CM

## 2020-12-18 DIAGNOSIS — Z53.29 PROCEDURE AND TREATMENT NOT CARRIED OUT BECAUSE OF PATIENT'S DECISION FOR OTHER REASONS: ICD-10-CM

## 2020-12-18 DIAGNOSIS — Z98.890 OTHER SPECIFIED POSTPROCEDURAL STATES: ICD-10-CM

## 2020-12-18 DIAGNOSIS — C71.3 MALIGNANT NEOPLASM OF PARIETAL LOBE: ICD-10-CM

## 2020-12-18 DIAGNOSIS — Z88.0 ALLERGY STATUS TO PENICILLIN: ICD-10-CM

## 2021-03-04 ENCOUNTER — NON-APPOINTMENT (OUTPATIENT)
Age: 26
End: 2021-03-04

## 2021-04-07 ENCOUNTER — RX RENEWAL (OUTPATIENT)
Age: 26
End: 2021-04-07

## 2021-04-29 ENCOUNTER — APPOINTMENT (OUTPATIENT)
Dept: MRI IMAGING | Facility: HOSPITAL | Age: 26
End: 2021-04-29

## 2021-04-29 ENCOUNTER — OUTPATIENT (OUTPATIENT)
Dept: OUTPATIENT SERVICES | Facility: HOSPITAL | Age: 26
LOS: 1 days | End: 2021-04-29
Payer: MEDICAID

## 2021-04-29 DIAGNOSIS — Z98.890 OTHER SPECIFIED POSTPROCEDURAL STATES: Chronic | ICD-10-CM

## 2021-04-29 PROCEDURE — 70553 MRI BRAIN STEM W/O & W/DYE: CPT | Mod: 26

## 2021-04-29 PROCEDURE — A9585: CPT

## 2021-04-29 PROCEDURE — 70553 MRI BRAIN STEM W/O & W/DYE: CPT

## 2021-08-02 ENCOUNTER — RX RENEWAL (OUTPATIENT)
Age: 26
End: 2021-08-02

## 2021-09-12 ENCOUNTER — OUTPATIENT (OUTPATIENT)
Dept: OUTPATIENT SERVICES | Facility: HOSPITAL | Age: 26
LOS: 1 days | End: 2021-09-12
Payer: MEDICAID

## 2021-09-12 ENCOUNTER — RESULT REVIEW (OUTPATIENT)
Age: 26
End: 2021-09-12

## 2021-09-12 ENCOUNTER — APPOINTMENT (OUTPATIENT)
Dept: MRI IMAGING | Facility: HOSPITAL | Age: 26
End: 2021-09-12

## 2021-09-12 DIAGNOSIS — Z98.890 OTHER SPECIFIED POSTPROCEDURAL STATES: Chronic | ICD-10-CM

## 2021-09-12 PROCEDURE — 70553 MRI BRAIN STEM W/O & W/DYE: CPT

## 2021-09-12 PROCEDURE — A9585: CPT

## 2021-09-12 PROCEDURE — 70553 MRI BRAIN STEM W/O & W/DYE: CPT | Mod: 26

## 2021-10-05 ENCOUNTER — NON-APPOINTMENT (OUTPATIENT)
Age: 26
End: 2021-10-05

## 2021-10-11 PROBLEM — G93.89 BRAIN MASS: Status: RESOLVED | Noted: 2021-10-11 | Resolved: 2021-10-11

## 2021-10-12 ENCOUNTER — APPOINTMENT (OUTPATIENT)
Dept: NEUROSURGERY | Facility: CLINIC | Age: 26
End: 2021-10-12
Payer: MEDICAID

## 2021-10-12 VITALS
TEMPERATURE: 97.5 F | OXYGEN SATURATION: 99 % | BODY MASS INDEX: 18.05 KG/M2 | RESPIRATION RATE: 18 BRPM | HEART RATE: 77 BPM | HEIGHT: 67 IN | WEIGHT: 115 LBS | DIASTOLIC BLOOD PRESSURE: 73 MMHG | SYSTOLIC BLOOD PRESSURE: 108 MMHG

## 2021-10-12 DIAGNOSIS — G93.89 OTHER SPECIFIED DISORDERS OF BRAIN: ICD-10-CM

## 2021-10-12 PROCEDURE — 99212 OFFICE O/P EST SF 10 MIN: CPT | Mod: NC

## 2021-10-12 NOTE — ASSESSMENT
[FreeTextEntry1] : \par PLAN\par \par Reviewed images at Brain Tumor conference and recommend RT therapy\par would refer her to Dr Madden and Dr Rivera  information given\par Continue AED as per Dr Barnard\par Pt verbalized understanding along with her mom\par \par \par \par \par I, Dr. Gardiner, personally performed the evaluation and management (E/M) services for this established patient who presents today with (a) new problem(s)/exacerbation of (an) existing condition(s). That E/M includes conducting the examination, assessing all new/exacerbated conditions, and establishing a new plan of care. Today, my ACP, Leda Martino, was here to observe my evaluation and management services for this new problem/exacerbated condition to be followed going forward.\par \par

## 2021-10-12 NOTE — HISTORY OF PRESENT ILLNESS
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  Duration is 10 seconds or less, but very painful contraction in the right leg and right side of her back.  Some also in the shoulder region.  She is conscious, and does not fall as she has some type of warning - lightheaded and leg goes numb first, allowing her to find a place to sit.  No post-seizure symptoms.\par \par Lamotrigine 250mg/d currently.  Without any change from its use, not better, not worse.\par ambEEG last month - seizure occurred at 1:29am Sept 9, but not reported in the EEG.\par \par Failed: LEV may or may not have worsened mood etc. was only on it for a month, but was angry and had mood swings.\par \par Todays visti 10/12/2021 to review most reent images and discuss further treatment management\par \par She was seen here Nov 28 2018, and we decided it would be reasonable to stop leveitracetam.  She was not tolerating it well, in terms of mood, edgy and very tired.\par \par She had noticed after surgery Oct 2018, the right arm and left felt weak after, and effortful.  She found her spatial processing and hand-eye coordination using the right hand.  She noticed after stopping levetiracetam she seemed to improve to 100%.\par \par However, about 1.5 weeks ago, she awoke feeling anxious.  At work, she felt her right leg feel weak, similar to after the surgery.  It was trembling a bit, but she was walking on it.  Within seconds she also felt it in the right arm, and felt a pulling of her shoulder/arm back, and she saw her right arm shake, and the leg shake as well.  She had no confusion and was able to call Dr. Gardiner during the event.  He sent some levetiracetam, but took one pill and felt horrible again, and stopped taking it again.\par \par \par \par Prior:\par When she was 14 years old she fell off a horse, and an MRI was done, which showed an incidental tumor.  Her neurosurgeon in [place] suggested following the tumor every 6 months.  It did not grow for many years but in the past few years there were some minimal changes, last MRI done in 2016.\par \par No seizures, and never felt 'weird'.\par \par In 2018, the first MRI in [place] showed 2cm growth from 2011 films, measuring, 4.6 x 3.5 x 3.4cm, left parietal at the left. \par \par Post-operatively, she had a weird reaction, not fully recognizing that people on the right were really there on the right, and some mild motor problems.  The scar is barely notable, and she is very pleased with the results.\par \par Levetiracetam was started in the post-operative time period, just in case, though no seizures.  She had been on steroids for the week after as well, and felt high the entire time.\par \par For 2 weeks she has been off the LEV.  The rEEG Nov 20th showed only slowing in the area of surgery.\par \par Pt is here today for a follow up visit and review MRI results\par Recommended to see Dr Madden and Dr Rivera  information given for further management Continue to f/u neurologist Dr Barnard in regard to AED since she is continuing to have r side seizures which happens at least once a month   She develop rUE and then radiates to rle  No LOC  had several EEG which did not  on eeg \par \par

## 2021-10-12 NOTE — PHYSICAL EXAM
[No Visual Abnormalities] : no visible abnormailities [No Tenderness to Palpation] : no spine tenderness on palpation [Full ROM] : full ROM [No Pain with ROM] : no pain with motion in any direction [Normal] : normal [Intact] : all reflexes within normal limits bilaterally [] : no respiratory distress [Auscultation Breath Sounds / Voice Sounds] : lungs were clear to auscultation bilaterally [Heart Rate And Rhythm] : heart rate was normal and rhythm regular [Heart Sounds] : normal S1 and S2 [Heart Sounds Gallop] : no gallops [Murmurs] : no murmurs [Heart Sounds Pericardial Friction Rub] : no pericardial rub [Full Pulse] : the pedal pulses are present [Edema] : there was no peripheral edema [L'Hermitte's] : neck flexion did not produce tingling down the spine/arms [Spurling's - Opposite Side] : Negative Spurling's on opposite side [Spurling's Same Side] : Negative Spurling's on same side [Straight-Leg Raise Test - Left] : straight leg raise of the left leg was negative [Straight-Leg Raise Test - Right] : straight leg raise  of the right leg was negative

## 2021-10-15 ENCOUNTER — APPOINTMENT (OUTPATIENT)
Dept: NEUROLOGY | Facility: CLINIC | Age: 26
End: 2021-10-15
Payer: MEDICAID

## 2021-10-15 VITALS
SYSTOLIC BLOOD PRESSURE: 124 MMHG | DIASTOLIC BLOOD PRESSURE: 81 MMHG | WEIGHT: 108 LBS | TEMPERATURE: 98.6 F | HEART RATE: 77 BPM | OXYGEN SATURATION: 96 %

## 2021-10-15 PROCEDURE — 99215 OFFICE O/P EST HI 40 MIN: CPT

## 2021-10-15 NOTE — DISCUSSION/SUMMARY
[FreeTextEntry1] : We discussed the rationale for adjuvant treatment - I have recommended RT with Temodar.\par Once she receives her medication, she will call for a chemo-teaching appt.\par Baseline blood work today.\par Any questions, she will call me.

## 2021-10-15 NOTE — PHYSICAL EXAM
[FreeTextEntry1] : She is awake and alert - oriented and fluent.\par EOMI, VFF \par Face is symmetric and tongue protrudes midline\par There is no drift\par FFM are equal bilaterally\par Strength is full in all 4 limbs.\par No extinction to DSS.\par She is ambulating independently and steadily.

## 2021-10-15 NOTE — HISTORY OF PRESENT ILLNESS
[FreeTextEntry1] : Patient is referred by Dr. Gardiner for evaluation and management of brain tumor.\par \par Patient is a 25 yo woman who initially had an MRI scan when she was 14 years old, living in [place] and had fallen off of a horse. An MRI at that time showed an incidental mass and she was seen by a neurosurgeon in [place], who recommended expectant follow up and per history, radiographic changes were noted in 2016.\par \par She initially saw Dr. Gardiner on 7/12/2018 - at that time she had no referable symptoms. AN MRI was repeated and resection was recommended. The first MRI I have for review is dated 10/29/2018 and shows a  left parietal mass measuring 5.1 x 3.5 x 3.8 cm. This pre-operative scan was without contrast although by chart review - her previous scans that had been performed with contrast showed that this lesion was non-enhancing.\par \par Dr. 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 Post-operative MRI (personally reviewed) showed a GTR.\par She was then followed expectantly.\par By report, post-surgery she has 10 days of right sided weakness which then improved. She took Keppra initially which she felt made her very depressed. There was no history of seizure and Keppra was stopped.\par \par In June, 2019 she had an episode of lightheadedness followed by right LE numbness and then shaking of the right LE.\par SHe was started on Lamictal - she tolerated this but reportedly did not refill.\par She then had 3 more episodes and saw Dr. Hardin in March 2020. MRI at that time showed increased flair changes anterior to the resection cavity. Lamictal was restarted with a plan to up-titrate.\par \par Seizures continued despite medication. MRI, last performed on 9/12/2021 - personally reviewed - over time flair abnormality has progressed anteriorly and also through the posterior corpus callosum. There is no enhancement and no hyperperfusion.\par \par Otherwise, she is well - no headache, focal weakness, numbness, coordination or gait trouble.\par She has no other PMH.\par Denies ETOH or tobacco.\par She is working in film production and writing a novel.

## 2021-10-18 LAB
ALBUMIN SERPL ELPH-MCNC: 5.1 G/DL
ALP BLD-CCNC: 59 U/L
ALT SERPL-CCNC: 9 U/L
ANION GAP SERPL CALC-SCNC: 10 MMOL/L
AST SERPL-CCNC: 15 U/L
BASOPHILS # BLD AUTO: 0.06 K/UL
BASOPHILS NFR BLD AUTO: 0.9 %
BILIRUB SERPL-MCNC: 0.2 MG/DL
BUN SERPL-MCNC: 13 MG/DL
CALCIUM SERPL-MCNC: 10.3 MG/DL
CHLORIDE SERPL-SCNC: 102 MMOL/L
CO2 SERPL-SCNC: 27 MMOL/L
CREAT SERPL-MCNC: 0.71 MG/DL
EOSINOPHIL # BLD AUTO: 0.17 K/UL
EOSINOPHIL NFR BLD AUTO: 2.6 %
GLUCOSE SERPL-MCNC: 84 MG/DL
HCT VFR BLD CALC: 40.4 %
HGB BLD-MCNC: 12.6 G/DL
IMM GRANULOCYTES NFR BLD AUTO: 0.2 %
LYMPHOCYTES # BLD AUTO: 1.34 K/UL
LYMPHOCYTES NFR BLD AUTO: 20.3 %
MAN DIFF?: NORMAL
MCHC RBC-ENTMCNC: 30.6 PG
MCHC RBC-ENTMCNC: 31.2 GM/DL
MCV RBC AUTO: 98.1 FL
MONOCYTES # BLD AUTO: 0.41 K/UL
MONOCYTES NFR BLD AUTO: 6.2 %
NEUTROPHILS # BLD AUTO: 4.61 K/UL
NEUTROPHILS NFR BLD AUTO: 69.8 %
PLATELET # BLD AUTO: 278 K/UL
POTASSIUM SERPL-SCNC: 4.7 MMOL/L
PROT SERPL-MCNC: 7.7 G/DL
RBC # BLD: 4.12 M/UL
RBC # FLD: 13.9 %
SODIUM SERPL-SCNC: 140 MMOL/L
WBC # FLD AUTO: 6.6 K/UL

## 2021-11-09 ENCOUNTER — APPOINTMENT (OUTPATIENT)
Dept: RADIATION ONCOLOGY | Facility: CLINIC | Age: 26
End: 2021-11-09
Payer: MEDICAID

## 2021-11-09 VITALS
DIASTOLIC BLOOD PRESSURE: 90 MMHG | HEIGHT: 67 IN | TEMPERATURE: 98.6 F | OXYGEN SATURATION: 100 % | SYSTOLIC BLOOD PRESSURE: 125 MMHG | BODY MASS INDEX: 16.95 KG/M2 | WEIGHT: 108 LBS | HEART RATE: 73 BPM

## 2021-11-09 PROCEDURE — 99204 OFFICE O/P NEW MOD 45 MIN: CPT | Mod: 25

## 2021-11-10 ENCOUNTER — NON-APPOINTMENT (OUTPATIENT)
Age: 26
End: 2021-11-10

## 2021-11-12 ENCOUNTER — APPOINTMENT (OUTPATIENT)
Dept: NEUROLOGY | Facility: CLINIC | Age: 26
End: 2021-11-12
Payer: MEDICAID

## 2021-11-12 ENCOUNTER — RX RENEWAL (OUTPATIENT)
Age: 26
End: 2021-11-12

## 2021-11-12 DIAGNOSIS — Z00.00 ENCOUNTER FOR GENERAL ADULT MEDICAL EXAMINATION W/OUT ABNORMAL FINDINGS: ICD-10-CM

## 2021-11-12 PROCEDURE — 99215 OFFICE O/P EST HI 40 MIN: CPT | Mod: 95

## 2021-11-12 NOTE — HISTORY OF PRESENT ILLNESS
[FreeTextEntry1] : Patient is a 26 year old female whom I have seen via tele health for follow up and chemo teaching\par \par In brief\par \par Initial MRI scan when she was 14 years old, living in [place] and had fallen off of a horse. An MRI at that time showed an incidental mass and she was seen by a neurosurgeon in [place], who recommended expectant follow up and per history, radiographic changes were noted in 2016.\par \par 7/12/2018- Follow up with Dr Gardiner , no referable symptoms. AN MRI was repeated and resection was recommended.\par  10/29/2018- MRI showed  a left parietal mass measuring 5.1 x 3.5 x 3.8 cm. 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 Post-operative MRI (personally reviewed) showed a GTR.\par She was then followed expectantly.\par By report, post-surgery she has 10 days of right sided weakness which then improved. She took Keppra initially which she felt made her very depressed. There was no history of seizure and Keppra was stopped.\par \par 6/2019 -  she had an episode of lightheadedness followed by right LE numbness and then shaking of the right LE.\par SHe was started on Lamictal - she tolerated this but reportedly did not refill.\par She then had 3 more episodes and saw Dr. Hardin in March 2020. MRI at that time showed increased flair changes anterior to the resection cavity. Lamictal was restarted with a plan to up-titrate.\par \par Seizures continued despite medication. MRI, last performed on 9/12/2021 -over time flair abnormality has progressed anteriorly and also through the posterior corpus callosum. There is no enhancement and no hyperperfusion. \par 10/15/2021 - Recommended STUPP protocol\par 11/12/2021- Patient seen via tele health for a follow up and chemo teaching . Offers no new complaints. Patient had mask fitting this am . Received chemo pills, awaiting RT date\par \par

## 2021-11-12 NOTE — DISCUSSION/SUMMARY
[FreeTextEntry1] : Patient seen via tele health\par Clinically stable\par Temozolomide instructions reviewed with patient\par Explained to take Zofran 2 ½ hours after dinner followed by TMZ 30 minutes later. Patient to remain NPO till morning after taking chemo pill.\par Explained medication side effects in detail. \par Educational pamphlet emailed\par Reinforced weekly CBC\par Patient to take TDD of 120 mg X 42 days\par Patient to follow up mid chemo radiation. Mom or patient to call once RT date is set\par Will schedule follow up accordingly\par In the interim, if any questions or concerns they know to call our office\par \par \par \par

## 2021-11-12 NOTE — REASON FOR VISIT
[Home] : at home, [unfilled] , at the time of the visit. [Medical Office: ([facility])___] : at the medical office located in  [Mother] : mother [Verbal consent obtained from patient] : the patient, [unfilled]

## 2021-11-12 NOTE — PHYSICAL EXAM
[FreeTextEntry1] : Exam limited due to TEB\par Patient seen and examined\par Alert, awake , Oriented X 3. Speech clear and fluent\par Face symmetrical. Tongue protrudes midline\par WERNER x 4 .No drift noted\par Gait not assessed [General Appearance - Alert] : alert [General Appearance - In No Acute Distress] : in no acute distress [General Appearance - Well Nourished] : well nourished

## 2021-11-30 ENCOUNTER — NON-APPOINTMENT (OUTPATIENT)
Age: 26
End: 2021-11-30

## 2021-11-30 NOTE — REASON FOR VISIT
[Routine On-Treatment] : a routine on-treatment visit for [Brain Tumor] : brain tumor [Friend] : friend

## 2021-11-30 NOTE — REVIEW OF SYSTEMS
[Constipation: Grade 0] : Constipation: Grade 0 [Dysphagia: Grade 0] : Dysphagia: Grade 0 [Nausea: Grade 0] : Nausea: Grade 0 [Vomiting: Grade 0] : Vomiting: Grade 0 [Fatigue: Grade 0] : Fatigue: Grade 0 [Localized Edema: Grade 0] : Localized Edema: Grade 0  [Hematuria: Grade 0] : Hematuria: Grade 0 [Urinary Urgency: Grade 0] : Urinary Urgency: Grade 0 [Urinary Frequency: Grade 0] : Urinary Frequency: Grade 0 [Tinnitus - Grade 0] : Tinnitus - Grade 0 [Blurred Vision: Grade 0] : Blurred Vision: Grade 0 [Xerostomia: Grade 0] : Xerostomia: Grade 0 [Oral Pain: Grade 0] : Oral Pain: Grade 0

## 2021-12-01 NOTE — HISTORY OF PRESENT ILLNESS
[FreeTextEntry1] : Ms. Flynn is a 25 yo woman who was found to have a brain tumor when she was 14 years old, living in [place] and had fallen off of a horse. An MRI at that time showed an incidental mass and it was followed as  per the patient's report. Radiographic changes were noted in 2016. She initially saw Dr. Gardiner on 7/12/2018 - at that time she had no referable symptoms. Am MRI was repeated and resection was recommended. The MRI dated 10/29/2018 demonstrates left parietal mass measuring 5.1 x 3.5 x 3.8 cm. This pre-operative scan was without contrast although by chart review - her previous scans that had been performed with contrast showed that this lesion was non-enhancing.\par \par Dr. 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 Post-operative MRI  showed a GTR. She was then followed expectantly.By report, post-surgery she has 10 days of right sided weakness which then improved. She took Keppra initially which she felt made her very depressed. There was no history of seizure and Keppra was stopped.\par In June, 2019 she had an episode of lightheadedness followed by right LE numbness and then shaking of the right LE.\par SHe was started on Lamictal - she tolerated this but reportedly did not refill.\par She then had 3 more episodes and saw Dr. Hardin in March 2020. MRI at that time showed increased flair changes anterior to the resection cavity. Lamictal was restarted with a plan to up-titrate.\par \par Seizures continued despite medication. MRI, last performed on 9/12/2021 - personally reviewed - over time flair abnormality has progressed anteriorly and also through the posterior corpus callosum. There is no enhancement and no hyperperfusion.\par ON CONCURRENT CHEMOTHERAPY FOR 42 DAYS.\par \par 11/30/21: FIRST OTV - Patient has completed 1/30 fx or 18/54 Gy to partial brain. Today patient has no complaints. No neurological changes noted. Denies any headache or visionary changes. Denies any pain in the mouth, with no redness noted. She has no skin changes noted. Denies any pain when swallowing or sore throat, continues to eat well. On concurrent chemotherapy.\par \par \par \par \par

## 2021-12-01 NOTE — PHYSICAL EXAM
[de-identified] : No siezure in the office during consultation; no localizing symptons or focal abnormalities

## 2021-12-06 ENCOUNTER — APPOINTMENT (OUTPATIENT)
Dept: NEUROLOGY | Facility: CLINIC | Age: 26
End: 2021-12-06
Payer: MEDICAID

## 2021-12-06 ENCOUNTER — NON-APPOINTMENT (OUTPATIENT)
Age: 26
End: 2021-12-06

## 2021-12-06 VITALS
HEIGHT: 67 IN | HEART RATE: 77 BPM | DIASTOLIC BLOOD PRESSURE: 69 MMHG | WEIGHT: 108 LBS | BODY MASS INDEX: 16.95 KG/M2 | SYSTOLIC BLOOD PRESSURE: 102 MMHG | OXYGEN SATURATION: 98 % | TEMPERATURE: 97.6 F

## 2021-12-06 PROCEDURE — 99215 OFFICE O/P EST HI 40 MIN: CPT

## 2021-12-06 RX ORDER — FOLIC ACID 1 MG/1
1 TABLET ORAL DAILY
Qty: 90 | Refills: 1 | Status: DISCONTINUED | COMMUNITY
Start: 2020-12-10 | End: 2021-12-06

## 2021-12-06 RX ORDER — LAMOTRIGINE 250 MG/1
250 TABLET, EXTENDED RELEASE ORAL DAILY
Qty: 180 | Refills: 1 | Status: DISCONTINUED | COMMUNITY
Start: 2020-07-30 | End: 2021-12-06

## 2021-12-06 NOTE — DISCUSSION/SUMMARY
[FreeTextEntry1] : CBC today.\par Having seizures (right sided cramping) approximately every 3 weeks - she feels that she is often aware when she wakes up that she will have a seizure that day. WIll discuss with Dr. Cali whether Klonopin on those days may be of value.\par Follow up in 4 weeks.

## 2021-12-06 NOTE — HISTORY OF PRESENT ILLNESS
[FreeTextEntry1] : Patient is a 26 year old female whom I have seen via [employer] for follow up and chemo teaching\par \par In brief\par \par Initial MRI scan when she was 14 years old, living in [place] and had fallen off of a horse. An MRI at that time showed an incidental mass and she was seen by a neurosurgeon in [place], who recommended expectant follow up and per history, radiographic changes were noted in 2016.\par \par 7/12/2018- Follow up with Dr Gardiner , no referable symptoms. AN MRI was repeated and resection was recommended.\par  10/29/2018- MRI showed a left parietal mass measuring 5.1 x 3.5 x 3.8 cm. 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 Post-operative MRI (personally reviewed) showed a GTR.\par She was then followed expectantly.\par By report, post-surgery she has 10 days of right sided weakness which then improved. She took Keppra initially which she felt made her very depressed. There was no history of seizure and Keppra was stopped.\par \par 6/2019 - she had an episode of lightheadedness followed by right LE numbness and then shaking of the right LE.\par SHe was started on Lamictal - she tolerated this but reportedly did not refill.\par She then had 3 more episodes and saw Dr. Hardin in March 2020. MRI at that time showed increased flair changes anterior to the resection cavity. Lamictal was restarted with a plan to up-titrate.\par \par Seizures continued despite medication. MRI, last performed on 9/12/2021 -over time flair abnormality has progressed anteriorly and also through the posterior corpus callosum. There is no enhancement and no hyperperfusion. \par 10/15/2021 - Recommended STUPP protocol\par 11/30- RT started - so far she has tolerated well - no nausea - does have constipation.\par \par  \par

## 2021-12-07 ENCOUNTER — NON-APPOINTMENT (OUTPATIENT)
Age: 26
End: 2021-12-07

## 2021-12-07 VITALS
BODY MASS INDEX: 16.79 KG/M2 | HEIGHT: 67 IN | WEIGHT: 107 LBS | OXYGEN SATURATION: 100 % | SYSTOLIC BLOOD PRESSURE: 98 MMHG | HEART RATE: 69 BPM | TEMPERATURE: 98.3 F | DIASTOLIC BLOOD PRESSURE: 61 MMHG

## 2021-12-07 LAB
BASOPHILS # BLD AUTO: 0.07 K/UL
BASOPHILS NFR BLD AUTO: 0.8 %
EOSINOPHIL # BLD AUTO: 0.19 K/UL
EOSINOPHIL NFR BLD AUTO: 2.2 %
HCT VFR BLD CALC: 39.8 %
HGB BLD-MCNC: 12.6 G/DL
IMM GRANULOCYTES NFR BLD AUTO: 0.3 %
LYMPHOCYTES # BLD AUTO: 1.34 K/UL
LYMPHOCYTES NFR BLD AUTO: 15.5 %
MAN DIFF?: NORMAL
MCHC RBC-ENTMCNC: 30.7 PG
MCHC RBC-ENTMCNC: 31.7 GM/DL
MCV RBC AUTO: 96.8 FL
MONOCYTES # BLD AUTO: 0.55 K/UL
MONOCYTES NFR BLD AUTO: 6.3 %
NEUTROPHILS # BLD AUTO: 6.49 K/UL
NEUTROPHILS NFR BLD AUTO: 74.9 %
PLATELET # BLD AUTO: 292 K/UL
RBC # BLD: 4.11 M/UL
RBC # FLD: 13.9 %
WBC # FLD AUTO: 8.67 K/UL

## 2021-12-07 NOTE — HISTORY OF PRESENT ILLNESS
[FreeTextEntry1] : 12/7/21 - OTV - The patient has completed (7/30fx) 1080 cGy / 5400 cGy to the Partial Brain. Today, she notes that she feels well. No neurological changes are noted. She has no skin or hair loss changes noted. Phylicia reports mild fatigue.  I reviewed blood work (CBC from 12/6) which is WNL. Will continue ChemoRT as planned.\par \par 11/30/21: FIRST OTV - Patient has completed 1/30 fx or 1.8/54 Gy to partial brain. Today patient has no complaints. No neurological changes noted. Denies any headache or visionary changes. Denies any pain in the mouth, with no redness noted. She has no skin changes noted. Denies any pain when swallowing or sore throat, continues to eat well. On concurrent chemotherapy.\par \par \par Ms. Flynn is a 25 yo woman who was found to have a brain tumor when she was 14 years old, living in [place] and had fallen off of a horse. An MRI at that time showed an incidental mass and it was followed as  per the patient's report. Radiographic changes were noted in 2016. She initially saw Dr. Gardiner on 7/12/2018 - at that time she had no referable symptoms. Am MRI was repeated and resection was recommended. The MRI dated 10/29/2018 demonstrates left parietal mass measuring 5.1 x 3.5 x 3.8 cm. This pre-operative scan was without contrast although by chart review - her previous scans that had been performed with contrast showed that this lesion was non-enhancing.\par \par Dr. 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 Post-operative MRI  showed a GTR. She was then followed expectantly.By report, post-surgery she has 10 days of right sided weakness which then improved. She took Keppra initially which she felt made her very depressed. There was no history of seizure and Keppra was stopped.\par In June, 2019 she had an episode of lightheadedness followed by right LE numbness and then shaking of the right LE.\par SHe was started on Lamictal - she tolerated this but reportedly did not refill.\par She then had 3 more episodes and saw Dr. Hardin in March 2020. MRI at that time showed increased flair changes anterior to the resection cavity. Lamictal was restarted with a plan to up-titrate.\par \par Seizures continued despite medication. MRI, last performed on 9/12/2021 - personally reviewed - over time flair abnormality has progressed anteriorly and also through the posterior corpus callosum. There is no enhancement and no hyperperfusion.\par ON CONCURRENT CHEMOTHERAPY FOR 42 DAYS.\par \par \par \par \par \par

## 2021-12-14 ENCOUNTER — NON-APPOINTMENT (OUTPATIENT)
Age: 26
End: 2021-12-14

## 2021-12-14 VITALS
HEART RATE: 66 BPM | WEIGHT: 106 LBS | SYSTOLIC BLOOD PRESSURE: 104 MMHG | HEIGHT: 67 IN | TEMPERATURE: 98.5 F | BODY MASS INDEX: 16.64 KG/M2 | OXYGEN SATURATION: 100 % | DIASTOLIC BLOOD PRESSURE: 71 MMHG

## 2021-12-14 LAB
BASOPHILS # BLD AUTO: 0.06 K/UL
BASOPHILS NFR BLD AUTO: 1.2 %
EOSINOPHIL # BLD AUTO: 0.19 K/UL
EOSINOPHIL NFR BLD AUTO: 4 %
HCT VFR BLD CALC: 39 %
HGB BLD-MCNC: 12.2 G/DL
IMM GRANULOCYTES NFR BLD AUTO: 0.2 %
LYMPHOCYTES # BLD AUTO: 1.11 K/UL
LYMPHOCYTES NFR BLD AUTO: 23.1 %
MAN DIFF?: NORMAL
MCHC RBC-ENTMCNC: 30.9 PG
MCHC RBC-ENTMCNC: 31.3 GM/DL
MCV RBC AUTO: 98.7 FL
MONOCYTES # BLD AUTO: 0.43 K/UL
MONOCYTES NFR BLD AUTO: 8.9 %
NEUTROPHILS # BLD AUTO: 3.01 K/UL
NEUTROPHILS NFR BLD AUTO: 62.6 %
PLATELET # BLD AUTO: 233 K/UL
RBC # BLD: 3.95 M/UL
RBC # FLD: 14 %
WBC # FLD AUTO: 4.81 K/UL

## 2021-12-14 NOTE — DISEASE MANAGEMENT
[Pathological] : TNM Stage: p [N/A] : Currently not applicable [TTNM] : - [NTNM] : -- [MTNM] : - [de-identified] : 1080 [de-identified] : 2470 [de-identified] : Partial Brain

## 2021-12-14 NOTE — HISTORY OF PRESENT ILLNESS
[FreeTextEntry1] : 12/14/21- OTV -  Patient has completed (11/30fx) 26Gy/54 Gy to the Partial Brain. Today, she continues to do well with minimal reactions: only mild fatigue. She denies hair loss, headache, nausea. Blood work is check and is WNL. Continue chemo RT as planned\par \par 12/7/21 - OTV - The patient has completed (7/30fx) 1080 cGy / 5400 cGy to the Partial Brain. Today, she notes that she feels well. No neurological changes are noted. She has no skin or hair loss changes noted. Phylicia reports mild fatigue.  I reviewed blood work (CBC from 12/6) which is WNL. Will continue ChemoRT as planned.\par \par 11/30/21: FIRST OTV - Patient has completed 1/30 fx or 1.8/54 Gy to partial brain. Today patient has no complaints. No neurological changes noted. Denies any headache or visionary changes. Denies any pain in the mouth, with no redness noted. She has no skin changes noted. Denies any pain when swallowing or sore throat, continues to eat well. On concurrent chemotherapy.\par \par \par Ms. Flynn is a 27 yo woman who was found to have a brain tumor when she was 14 years old, living in [place] and had fallen off of a horse. An MRI at that time showed an incidental mass and it was followed as  per the patient's report. Radiographic changes were noted in 2016. She initially saw Dr. Gardiner on 7/12/2018 - at that time she had no referable symptoms. Am MRI was repeated and resection was recommended. The MRI dated 10/29/2018 demonstrates left parietal mass measuring 5.1 x 3.5 x 3.8 cm. This pre-operative scan was without contrast although by chart review - her previous scans that had been performed with contrast showed that this lesion was non-enhancing.\par \par Dr. 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 Post-operative MRI  showed a GTR. She was then followed expectantly.By report, post-surgery she has 10 days of right sided weakness which then improved. She took Keppra initially which she felt made her very depressed. There was no history of seizure and Keppra was stopped.\par In June, 2019 she had an episode of lightheadedness followed by right LE numbness and then shaking of the right LE.\par SHe was started on Lamictal - she tolerated this but reportedly did not refill.\par She then had 3 more episodes and saw Dr. Hardin in March 2020. MRI at that time showed increased flair changes anterior to the resection cavity. Lamictal was restarted with a plan to up-titrate.\par \par Seizures continued despite medication. MRI, last performed on 9/12/2021 - personally reviewed - over time flair abnormality has progressed anteriorly and also through the posterior corpus callosum. There is no enhancement and no hyperperfusion.\par ON CONCURRENT CHEMOTHERAPY FOR 42 DAYS.\par \par \par \par \par \par

## 2021-12-15 NOTE — HISTORY OF PRESENT ILLNESS
[FreeTextEntry1] : \par Ms. Flynn is a 27 yo woman who was found to have a brain tumor when she was 14 years old, living in [place] and had fallen off of a horse. An MRI at that time showed an incidental mass and it was followed as  per the patient's report. Radiographic changes were noted in 2016. She initially saw Dr. Gardiner on 7/12/2018 - at that time she had no referable symptoms. Am MRI was repeated and resection was recommended. The MRI dated 10/29/2018 demonstrates left parietal mass measuring 5.1 x 3.5 x 3.8 cm. This pre-operative scan was without contrast although by chart review - her previous scans that had been performed with contrast showed that this lesion was non-enhancing.\par \par Dr. 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 Post-operative MRI  showed a GTR. She was then followed expectantly.By report, post-surgery she has 10 days of right sided weakness which then improved. She took Keppra initially which she felt made her very depressed. There was no history of seizure and Keppra was stopped.\par In June, 2019 she had an episode of lightheadedness followed by right LE numbness and then shaking of the right LE.\par SHe was started on Lamictal - she tolerated this but reportedly did not refill.\par She then had 3 more episodes and saw Dr. Hardin in March 2020. MRI at that time showed increased flair changes anterior to the resection cavity. Lamictal was restarted with a plan to up-titrate.\par \par Seizures continued despite medication. MRI, last performed on 9/12/2021 - personally reviewed - over time flair abnormality has progressed anteriorly and also through the posterior corpus callosum. There is no enhancement and no hyperperfusion.\par \par 11/30/21: FIRST OTV - \par \par

## 2021-12-15 NOTE — PHYSICAL EXAM
[Normal] : normal skin color and pigmentation and no rash [No Focal Deficits] : no focal deficits [de-identified] : No siezure in the office during consultation; no localizing symptons or focal abnormalities

## 2021-12-15 NOTE — PHYSICAL EXAM
[Normal] : normal skin color and pigmentation and no rash [No Focal Deficits] : no focal deficits [de-identified] : No siezure in the office during consultation; no localizing symptons or focal abnormalities

## 2021-12-15 NOTE — HISTORY OF PRESENT ILLNESS
[FreeTextEntry1] : \par Ms. Flynn is a 25 yo woman who was found to have a brain tumor when she was 14 years old, living in [place] and had fallen off of a horse. An MRI at that time showed an incidental mass and it was followed as  per the patient's report. Radiographic changes were noted in 2016. She initially saw Dr. Gardiner on 7/12/2018 - at that time she had no referable symptoms. Am MRI was repeated and resection was recommended. The MRI dated 10/29/2018 demonstrates left parietal mass measuring 5.1 x 3.5 x 3.8 cm. This pre-operative scan was without contrast although by chart review - her previous scans that had been performed with contrast showed that this lesion was non-enhancing.\par \par Dr. 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 Post-operative MRI  showed a GTR. She was then followed expectantly.By report, post-surgery she has 10 days of right sided weakness which then improved. She took Keppra initially which she felt made her very depressed. There was no history of seizure and Keppra was stopped.\par In June, 2019 she had an episode of lightheadedness followed by right LE numbness and then shaking of the right LE.\par SHe was started on Lamictal - she tolerated this but reportedly did not refill.\par She then had 3 more episodes and saw Dr. Hardin in March 2020. MRI at that time showed increased flair changes anterior to the resection cavity. Lamictal was restarted with a plan to up-titrate.\par \par Seizures continued despite medication. MRI, last performed on 9/12/2021 - personally reviewed - over time flair abnormality has progressed anteriorly and also through the posterior corpus callosum. There is no enhancement and no hyperperfusion.\par \par 11/30/21: FIRST OTV - \par \par

## 2021-12-15 NOTE — PHYSICAL EXAM
[Normal] : normal skin color and pigmentation and no rash [No Focal Deficits] : no focal deficits [de-identified] : No siezure in the office during consultation; no localizing symptons or focal abnormalities

## 2021-12-16 ENCOUNTER — APPOINTMENT (OUTPATIENT)
Dept: NEUROLOGY | Facility: CLINIC | Age: 26
End: 2021-12-16

## 2021-12-21 ENCOUNTER — NON-APPOINTMENT (OUTPATIENT)
Age: 26
End: 2021-12-21

## 2021-12-21 VITALS
RESPIRATION RATE: 16 BRPM | TEMPERATURE: 98.3 F | DIASTOLIC BLOOD PRESSURE: 68 MMHG | HEART RATE: 74 BPM | WEIGHT: 109.19 LBS | HEIGHT: 67 IN | OXYGEN SATURATION: 95 % | BODY MASS INDEX: 17.14 KG/M2 | SYSTOLIC BLOOD PRESSURE: 101 MMHG

## 2021-12-21 NOTE — REVIEW OF SYSTEMS
[Constipation: Grade 0] : Constipation: Grade 0 [Diarrhea: Grade 0] : Diarrhea: Grade 0 [Tinnitus - Grade 0] : Tinnitus - Grade 0 [Blurred Vision: Grade 0] : Blurred Vision: Grade 0 [Fatigue: Grade 0] : Fatigue: Grade 0 [Alopecia: Grade 1 - Hair loss of <50% of normal for that individual that is not obvious from a distance but only on close inspection; a different hair style may be required to cover the hair loss but it does not require a wig or hair piece to camouflage] : Alopecia: Grade 1 - Hair loss of <50% of normal for that individual that is not obvious from a distance but only on close inspection; a different hair style may be required to cover the hair loss but it does not require a wig or hair piece to camouflage

## 2021-12-22 LAB
BASOPHILS # BLD AUTO: 0.05 K/UL
BASOPHILS NFR BLD AUTO: 1.1 %
EOSINOPHIL # BLD AUTO: 0.19 K/UL
EOSINOPHIL NFR BLD AUTO: 4.3 %
HCT VFR BLD CALC: 39.9 %
HGB BLD-MCNC: 12.7 G/DL
IMM GRANULOCYTES NFR BLD AUTO: 0.2 %
LYMPHOCYTES # BLD AUTO: 0.86 K/UL
LYMPHOCYTES NFR BLD AUTO: 19.4 %
MAN DIFF?: NORMAL
MCHC RBC-ENTMCNC: 31.4 PG
MCHC RBC-ENTMCNC: 31.8 GM/DL
MCV RBC AUTO: 98.5 FL
MONOCYTES # BLD AUTO: 0.42 K/UL
MONOCYTES NFR BLD AUTO: 9.5 %
NEUTROPHILS # BLD AUTO: 2.9 K/UL
NEUTROPHILS NFR BLD AUTO: 65.5 %
PLATELET # BLD AUTO: 276 K/UL
RBC # BLD: 4.05 M/UL
RBC # FLD: 14.1 %
WBC # FLD AUTO: 4.43 K/UL

## 2021-12-28 ENCOUNTER — NON-APPOINTMENT (OUTPATIENT)
Age: 26
End: 2021-12-28

## 2021-12-28 VITALS
TEMPERATURE: 98.5 F | HEIGHT: 67 IN | WEIGHT: 108 LBS | SYSTOLIC BLOOD PRESSURE: 106 MMHG | HEART RATE: 78 BPM | DIASTOLIC BLOOD PRESSURE: 65 MMHG | OXYGEN SATURATION: 100 % | RESPIRATION RATE: 18 BRPM

## 2021-12-28 LAB
BASOPHILS # BLD AUTO: 0.05 K/UL
BASOPHILS NFR BLD AUTO: 1.4 %
EOSINOPHIL # BLD AUTO: 0.24 K/UL
EOSINOPHIL NFR BLD AUTO: 6.9 %
HCT VFR BLD CALC: 36.7 %
HGB BLD-MCNC: 11.6 G/DL
IMM GRANULOCYTES NFR BLD AUTO: 0.3 %
LYMPHOCYTES # BLD AUTO: 0.74 K/UL
LYMPHOCYTES NFR BLD AUTO: 21.3 %
MAN DIFF?: NORMAL
MCHC RBC-ENTMCNC: 31.2 PG
MCHC RBC-ENTMCNC: 31.6 GM/DL
MCV RBC AUTO: 98.7 FL
MONOCYTES # BLD AUTO: 0.36 K/UL
MONOCYTES NFR BLD AUTO: 10.4 %
NEUTROPHILS # BLD AUTO: 2.07 K/UL
NEUTROPHILS NFR BLD AUTO: 59.7 %
PLATELET # BLD AUTO: 250 K/UL
RBC # BLD: 3.72 M/UL
RBC # FLD: 14 %
WBC # FLD AUTO: 3.47 K/UL

## 2021-12-28 NOTE — REVIEW OF SYSTEMS
[Constipation: Grade 0] : Constipation: Grade 0 [Diarrhea: Grade 0] : Diarrhea: Grade 0 [Fatigue: Grade 0] : Fatigue: Grade 0 [Tinnitus - Grade 0] : Tinnitus - Grade 0 [Blurred Vision: Grade 0] : Blurred Vision: Grade 0 [Alopecia: Grade 1 - Hair loss of <50% of normal for that individual that is not obvious from a distance but only on close inspection; a different hair style may be required to cover the hair loss but it does not require a wig or hair piece to camouflage] : Alopecia: Grade 1 - Hair loss of <50% of normal for that individual that is not obvious from a distance but only on close inspection; a different hair style may be required to cover the hair loss but it does not require a wig or hair piece to camouflage

## 2022-01-03 ENCOUNTER — APPOINTMENT (OUTPATIENT)
Dept: NEUROLOGY | Facility: CLINIC | Age: 27
End: 2022-01-03
Payer: MEDICAID

## 2022-01-03 ENCOUNTER — LABORATORY RESULT (OUTPATIENT)
Age: 27
End: 2022-01-03

## 2022-01-03 PROCEDURE — 99215 OFFICE O/P EST HI 40 MIN: CPT | Mod: 95

## 2022-01-03 NOTE — DISCUSSION/SUMMARY
[FreeTextEntry1] : Plan is for follow up with an MRI @ February 9 and follow up thereafter.\par If she has any interval issues, she knows to call me.

## 2022-01-03 NOTE — PHYSICAL EXAM
[FreeTextEntry1] : Detailed neurologic exam is not possible, But she is awake and alert - oriented and fluent - cognitively doing well.\par Face appears symmetric and tongue is midline\par UE strong.\par Ambulating independently.

## 2022-01-03 NOTE — HISTORY OF PRESENT ILLNESS
[FreeTextEntry1] : Patient is a 26 year old female whom I have seen via [employer] for follow up and chemo teaching. SHe was alone in her home in [facility] and I was working remotely in [place]. Prior consent had been obtained.\par \par In brief,\par \par Initial MRI scan when she was 14 years old, living in [place] and had fallen off of a horse. An MRI at that time showed an incidental mass and she was seen by a neurosurgeon in [place], who recommended expectant follow up and per history, radiographic changes were noted in 2016.\par \par 7/12/2018- Follow up with Dr Gardiner , no referable symptoms. AN MRI was repeated and resection was recommended.\par 10/29/2018- MRI showed a left parietal mass measuring 5.1 x 3.5 x 3.8 cm. 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 Post-operative MRI (personally reviewed) showed a GTR.\par She was then followed expectantly.\par By report, post-surgery she has 10 days of right sided weakness which then improved. She took Keppra initially which she felt made her very depressed. There was no history of seizure and Keppra was stopped.\par She has almost completed her treatment - Temodar is due to stop on the 8th and RT on the 12th. She denies any headache, nausea, seizure, or focal weakness. Weekly CBCs have help up well.\par \par 6/2019 - she had an episode of lightheadedness followed by right LE numbness and then shaking of the right LE.\par SHe was started on Lamictal - she tolerated this but reportedly did not refill.\par She then had 3 more episodes and saw Dr. Hardin in March 2020. MRI at that time showed increased flair changes anterior to the resection cavity. Lamictal was restarted with a plan to up-titrate.\par \par Seizures continued despite medication. MRI, last performed on 9/12/2021 -over time flair abnormality has progressed anteriorly and also through the posterior corpus callosum. There is no enhancement and no hyperperfusion. \par 10/15/2021 - Recommended STUPP protocol\par 11/30- RT started \par \par  \par \par  \par

## 2022-01-04 ENCOUNTER — NON-APPOINTMENT (OUTPATIENT)
Age: 27
End: 2022-01-04

## 2022-01-05 ENCOUNTER — NON-APPOINTMENT (OUTPATIENT)
Age: 27
End: 2022-01-05

## 2022-01-11 ENCOUNTER — NON-APPOINTMENT (OUTPATIENT)
Age: 27
End: 2022-01-11

## 2022-01-14 LAB
BASOPHILS # BLD AUTO: 0.04 K/UL
BASOPHILS # BLD AUTO: 0.04 K/UL
BASOPHILS NFR BLD AUTO: 0.9 %
BASOPHILS NFR BLD AUTO: 1 %
EOSINOPHIL # BLD AUTO: 0.27 K/UL
EOSINOPHIL # BLD AUTO: 0.35 K/UL
EOSINOPHIL NFR BLD AUTO: 6.1 %
EOSINOPHIL NFR BLD AUTO: 8.5 %
HCT VFR BLD CALC: 35.8 %
HCT VFR BLD CALC: 38 %
HGB BLD-MCNC: 11.4 G/DL
HGB BLD-MCNC: 11.9 G/DL
IMM GRANULOCYTES NFR BLD AUTO: 0.2 %
LYMPHOCYTES # BLD AUTO: 0.27 K/UL
LYMPHOCYTES # BLD AUTO: 0.42 K/UL
LYMPHOCYTES NFR BLD AUTO: 10.1 %
LYMPHOCYTES NFR BLD AUTO: 6.1 %
MAN DIFF?: NORMAL
MAN DIFF?: NORMAL
MCHC RBC-ENTMCNC: 30.5 PG
MCHC RBC-ENTMCNC: 31.3 GM/DL
MCHC RBC-ENTMCNC: 31.4 PG
MCHC RBC-ENTMCNC: 31.8 GM/DL
MCV RBC AUTO: 97.4 FL
MCV RBC AUTO: 98.6 FL
MONOCYTES # BLD AUTO: 0.31 K/UL
MONOCYTES # BLD AUTO: 0.36 K/UL
MONOCYTES NFR BLD AUTO: 7 %
MONOCYTES NFR BLD AUTO: 8.7 %
NEUTROPHILS # BLD AUTO: 2.96 K/UL
NEUTROPHILS # BLD AUTO: 3.45 K/UL
NEUTROPHILS NFR BLD AUTO: 71.5 %
NEUTROPHILS NFR BLD AUTO: 78.1 %
PLATELET # BLD AUTO: 138 K/UL
PLATELET # BLD AUTO: 206 K/UL
RBC # BLD: 3.63 M/UL
RBC # BLD: 3.9 M/UL
RBC # FLD: 13.9 %
RBC # FLD: 14 %
WBC # FLD AUTO: 4.14 K/UL
WBC # FLD AUTO: 4.42 K/UL

## 2022-02-07 NOTE — HISTORY OF PRESENT ILLNESS
[FreeTextEntry1] : 12/28/21-OTV- Patient has completed (20/30fx) 36Gy/54 Gy to the Partial Brain (parietal lobe).  Today,Today, she continues to do well with minimal reactions. Moderate hair loss on occipital and parietal area of head.. She denies headache, nausea. Continue chemo RT as planned.\par \par 12/21/21-OTV-  Patient has completed (16/30fx) 28Gy/54 Gy to the Partial Brain (parietal lobe).  Today, she continues to do well with minimal reactions. Minimal hair loss. She denies headache, nausea. Continue chemo RT as planned\par \par 12/14/21- OTV -  Patient has completed (11/30fx) 26Gy/54 Gy to the Partial Brain (parietal lobe). Today, she continues to do well with minimal reactions: only mild fatigue. She denies hair loss, headache, nausea. Blood work is checked and is WNL. Continue chemo RT as planned\par \par 12/7/21 - OTV - The patient has completed (7/30fx) 1080 cGy / 5400 cGy to the Partial Brain (parietal lobe).. Today, she notes that she feels well. No neurological changes are noted. She has no skin or hair loss changes noted. Phylicia reports mild fatigue.  I reviewed blood work (CBC from 12/6) which is WNL. Will continue ChemoRT as planned.\par \par 11/30/21: FIRST OTV - Patient has completed 1/30 fx or 1.8/54 Gy to partial brain. Today patient has no complaints. No neurological changes noted. Denies any headache or visionary changes. Denies any pain in the mouth, with no redness noted. She has no skin changes noted. Denies any pain when swallowing or sore throat, continues to eat well. On concurrent chemotherapy.\par \par \par Ms. Flynn is a 27 yo woman who was found to have a brain tumor when she was 14 years old, living in [place] and had fallen off of a horse. An MRI at that time showed an incidental mass and it was followed as  per the patient's report. Radiographic changes were noted in 2016. She initially saw Dr. Gardiner on 7/12/2018 - at that time she had no referable symptoms. Am MRI was repeated and resection was recommended. The MRI dated 10/29/2018 demonstrates left parietal mass measuring 5.1 x 3.5 x 3.8 cm. This pre-operative scan was without contrast although by chart review - her previous scans that had been performed with contrast showed that this lesion was non-enhancing.\par \par Dr. 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 Post-operative MRI  showed a GTR. She was then followed expectantly.By report, post-surgery she has 10 days of right sided weakness which then improved. She took Keppra initially which she felt made her very depressed. There was no history of seizure and Keppra was stopped.\par In June, 2019 she had an episode of lightheadedness followed by right LE numbness and then shaking of the right LE.\par SHe was started on Lamictal - she tolerated this but reportedly did not refill.\par She then had 3 more episodes and saw Dr. Hardin in March 2020. MRI at that time showed increased flair changes anterior to the resection cavity. Lamictal was restarted with a plan to up-titrate.\par \par Seizures continued despite medication. MRI, last performed on 9/12/2021 - personally reviewed - over time flair abnormality has progressed anteriorly and also through the posterior corpus callosum. There is no enhancement and no hyperperfusion.\par ON CONCURRENT CHEMOTHERAPY FOR 42 DAYS.\par \par \par \par \par \par

## 2022-02-07 NOTE — PHYSICAL EXAM
[Normal] : normoactive bowel sounds, soft and nontender, no hepatosplenomegaly or masses appreciated [Oriented To Time, Place, And Person] : oriented to person, place, and time [de-identified] : Alopecia

## 2022-02-07 NOTE — PHYSICAL EXAM
[Normal] : normoactive bowel sounds, soft and nontender, no hepatosplenomegaly or masses appreciated [Oriented To Time, Place, And Person] : oriented to person, place, and time [de-identified] : Alopecia

## 2022-02-07 NOTE — DISEASE MANAGEMENT
[Pathological] : TNM Stage: p [N/A] : Currently not applicable [TTNM] : - [NTNM] : -- [MTNM] : - [de-identified] : 1080 [de-identified] : 4839 [de-identified] : Partial Brain

## 2022-02-07 NOTE — PHYSICAL EXAM
[Normal] : normoactive bowel sounds, soft and nontender, no hepatosplenomegaly or masses appreciated [Oriented To Time, Place, And Person] : oriented to person, place, and time [de-identified] : Alopecia

## 2022-02-07 NOTE — DISEASE MANAGEMENT
[Pathological] : TNM Stage: p [N/A] : Currently not applicable [TTNM] : - [NTNM] : -- [MTNM] : - [de-identified] : 1080 [de-identified] : 4929 [de-identified] : Partial Brain

## 2022-02-07 NOTE — HISTORY OF PRESENT ILLNESS
[FreeTextEntry1] : 1/4/2022-OTV- Patient has completed (24/30fx) 43Gy/54 Gy to the Partial Brain (parietal lobe).Today, she continues to do well with minimal reactions. Moderate hair loss on occipital and parietal area of head.. She denies headache, nausea. Continue chemo RT as planned.\par \par 12/28/21-OTV- Patient has completed (20/30fx) 36Gy/54 Gy to the Partial Brain (parietal lobe).Today, she continues to do well with minimal reactions. Moderate hair loss on occipital and parietal area of head.. She denies headache, nausea. Continue chemo RT as planned.\par \par 12/21/21-OTV-  Patient has completed (16/30fx) 28Gy/54 Gy to the Partial Brain (parietal lobe).  Today, she continues to do well with minimal reactions. Minimal hair loss. She denies headache, nausea. Continue chemo RT as planned\par \par 12/14/21- OTV -  Patient has completed (11/30fx) 26Gy/54 Gy to the Partial Brain (parietal lobe). Today, she continues to do well with minimal reactions: only mild fatigue. She denies hair loss, headache, nausea. Blood work is checked and is WNL. Continue chemo RT as planned\par \par 12/7/21 - OTV - The patient has completed (7/30fx) 1080 cGy / 5400 cGy to the Partial Brain (parietal lobe).. Today, she notes that she feels well. No neurological changes are noted. She has no skin or hair loss changes noted. Phylicia reports mild fatigue.  I reviewed blood work (CBC from 12/6) which is WNL. Will continue ChemoRT as planned.\par \par 11/30/21: FIRST OTV - Patient has completed 1/30 fx or 1.8/54 Gy to partial brain. Today patient has no complaints. No neurological changes noted. Denies any headache or visionary changes. Denies any pain in the mouth, with no redness noted. She has no skin changes noted. Denies any pain when swallowing or sore throat, continues to eat well. On concurrent chemotherapy.\par \par \par Ms. Flynn is a 27 yo woman who was found to have a brain tumor when she was 14 years old, living in [place] and had fallen off of a horse. An MRI at that time showed an incidental mass and it was followed as  per the patient's report. Radiographic changes were noted in 2016. She initially saw Dr. Gardiner on 7/12/2018 - at that time she had no referable symptoms. Am MRI was repeated and resection was recommended. The MRI dated 10/29/2018 demonstrates left parietal mass measuring 5.1 x 3.5 x 3.8 cm. This pre-operative scan was without contrast although by chart review - her previous scans that had been performed with contrast showed that this lesion was non-enhancing.\par \par Dr. 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 Post-operative MRI  showed a GTR. She was then followed expectantly.By report, post-surgery she has 10 days of right sided weakness which then improved. She took Keppra initially which she felt made her very depressed. There was no history of seizure and Keppra was stopped.\par In June, 2019 she had an episode of lightheadedness followed by right LE numbness and then shaking of the right LE.\par SHe was started on Lamictal - she tolerated this but reportedly did not refill.\par She then had 3 more episodes and saw Dr. Hardin in March 2020. MRI at that time showed increased flair changes anterior to the resection cavity. Lamictal was restarted with a plan to up-titrate.\par \par Seizures continued despite medication. MRI, last performed on 9/12/2021 - personally reviewed - over time flair abnormality has progressed anteriorly and also through the posterior corpus callosum. There is no enhancement and no hyperperfusion.\par ON CONCURRENT CHEMOTHERAPY FOR 42 DAYS.\par \par \par \par \par \par

## 2022-02-07 NOTE — DISEASE MANAGEMENT
[Pathological] : TNM Stage: p [N/A] : Currently not applicable [TTNM] : - [NTNM] : -- [MTNM] : - [de-identified] : 1080 [de-identified] : 1282 [de-identified] : Partial Brain

## 2022-02-07 NOTE — HISTORY OF PRESENT ILLNESS
[FreeTextEntry1] : 1/11/22: FINAL OTV - Patient has completed (26/30fx) 47Gy/54 Gy to the Partial Brain (parietal lobe).Today, she continues to do well with minimal reactions. Moderate hair loss on occipital and parietal area of head.. She denies headache, nausea. Continue chemo RT as planned. MRI to be scheduled one month post RT treatment.\par \par 1/4/2021-OTV- Patient has completed (24/30fx) 43Gy/54 Gy to the Partial Brain (parietal lobe).Today, she continues to do well with minimal reactions. Moderate hair loss on occipital and parietal area of head.. She denies headache, nausea. Continue chemo RT as planned.\par \par 12/28/21-OTV- Patient has completed (20/30fx) 36Gy/54 Gy to the Partial Brain (parietal lobe).Today, she continues to do well with minimal reactions. Moderate hair loss on occipital and parietal area of head.. She denies headache, nausea. Continue chemo RT as planned.\par \par 12/21/21-OTV-  Patient has completed (16/30fx) 28Gy/54 Gy to the Partial Brain (parietal lobe).  Today, she continues to do well with minimal reactions. Minimal hair loss. She denies headache, nausea. Continue chemo RT as planned\par \par 12/14/21- OTV -  Patient has completed (11/30fx) 26Gy/54 Gy to the Partial Brain (parietal lobe). Today, she continues to do well with minimal reactions: only mild fatigue. She denies hair loss, headache, nausea. Blood work is checked and is WNL. Continue chemo RT as planned\par \par 12/7/21 - OTV - The patient has completed (7/30fx) 1080 cGy / 5400 cGy to the Partial Brain (parietal lobe).. Today, she notes that she feels well. No neurological changes are noted. She has no skin or hair loss changes noted. Phylicia reports mild fatigue.  I reviewed blood work (CBC from 12/6) which is WNL. Will continue ChemoRT as planned.\par \par 11/30/21: FIRST OTV - Patient has completed 1/30 fx or 1.8/54 Gy to partial brain. Today patient has no complaints. No neurological changes noted. Denies any headache or visionary changes. Denies any pain in the mouth, with no redness noted. She has no skin changes noted. Denies any pain when swallowing or sore throat, continues to eat well. On concurrent chemotherapy.\par \par \par Ms. Flynn is a 25 yo woman who was found to have a brain tumor when she was 14 years old, living in [place] and had fallen off of a horse. An MRI at that time showed an incidental mass and it was followed as  per the patient's report. Radiographic changes were noted in 2016. She initially saw Dr. Gardiner on 7/12/2018 - at that time she had no referable symptoms. Am MRI was repeated and resection was recommended. The MRI dated 10/29/2018 demonstrates left parietal mass measuring 5.1 x 3.5 x 3.8 cm. This pre-operative scan was without contrast although by chart review - her previous scans that had been performed with contrast showed that this lesion was non-enhancing.\par \par Dr. 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 Post-operative MRI  showed a GTR. She was then followed expectantly.By report, post-surgery she has 10 days of right sided weakness which then improved. She took Keppra initially which she felt made her very depressed. There was no history of seizure and Keppra was stopped.\par In June, 2019 she had an episode of lightheadedness followed by right LE numbness and then shaking of the right LE.\par SHe was started on Lamictal - she tolerated this but reportedly did not refill.\par She then had 3 more episodes and saw Dr. Hardin in March 2020. MRI at that time showed increased flair changes anterior to the resection cavity. Lamictal was restarted with a plan to up-titrate.\par \par Seizures continued despite medication. MRI, last performed on 9/12/2021 - personally reviewed - over time flair abnormality has progressed anteriorly and also through the posterior corpus callosum. There is no enhancement and no hyperperfusion.\par ON CONCURRENT CHEMOTHERAPY FOR 42 DAYS.\par \par \par \par \par \par

## 2022-02-07 NOTE — DISEASE MANAGEMENT
[Pathological] : TNM Stage: p [N/A] : Currently not applicable [TTNM] : - [NTNM] : -- [MTNM] : - [de-identified] : 1080 [de-identified] : 6563 [de-identified] : Partial Brain

## 2022-02-07 NOTE — HISTORY OF PRESENT ILLNESS
[FreeTextEntry1] : 12/21/21-OTV-  Patient has completed (16/30fx) 28Gy/54 Gy to the Partial Brain (parietal lobe).  Today, she continues to do well with minimal reactions. Minimal hair loss. She denies headache, nausea. Continue chemo RT as planned\par \par 12/14/21- OTV -  Patient has completed (11/30fx) 26Gy/54 Gy to the Partial Brain (parietal lobe). Today, she continues to do well with minimal reactions: only mild fatigue. She denies hair loss, headache, nausea. Blood work is checked and is WNL. Continue chemo RT as planned\par \par 12/7/21 - OTV - The patient has completed (7/30fx) 1080 cGy / 5400 cGy to the Partial Brain (parietal lobe).. Today, she notes that she feels well. No neurological changes are noted. She has no skin or hair loss changes noted. Phylicia reports mild fatigue.  I reviewed blood work (CBC from 12/6) which is WNL. Will continue ChemoRT as planned.\par \par 11/30/21: FIRST OTV - Patient has completed 1/30 fx or 1.8/54 Gy to partial brain. Today patient has no complaints. No neurological changes noted. Denies any headache or visionary changes. Denies any pain in the mouth, with no redness noted. She has no skin changes noted. Denies any pain when swallowing or sore throat, continues to eat well. On concurrent chemotherapy.\par \par \par Ms. Flynn is a 25 yo woman who was found to have a brain tumor when she was 14 years old, living in [place] and had fallen off of a horse. An MRI at that time showed an incidental mass and it was followed as  per the patient's report. Radiographic changes were noted in 2016. She initially saw Dr. Gardiner on 7/12/2018 - at that time she had no referable symptoms. Am MRI was repeated and resection was recommended. The MRI dated 10/29/2018 demonstrates left parietal mass measuring 5.1 x 3.5 x 3.8 cm. This pre-operative scan was without contrast although by chart review - her previous scans that had been performed with contrast showed that this lesion was non-enhancing.\par \par Dr. 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 Post-operative MRI  showed a GTR. She was then followed expectantly.By report, post-surgery she has 10 days of right sided weakness which then improved. She took Keppra initially which she felt made her very depressed. There was no history of seizure and Keppra was stopped.\par In June, 2019 she had an episode of lightheadedness followed by right LE numbness and then shaking of the right LE.\par SHe was started on Lamictal - she tolerated this but reportedly did not refill.\par She then had 3 more episodes and saw Dr. Hardin in March 2020. MRI at that time showed increased flair changes anterior to the resection cavity. Lamictal was restarted with a plan to up-titrate.\par \par Seizures continued despite medication. MRI, last performed on 9/12/2021 - personally reviewed - over time flair abnormality has progressed anteriorly and also through the posterior corpus callosum. There is no enhancement and no hyperperfusion.\par ON CONCURRENT CHEMOTHERAPY FOR 42 DAYS.\par \par \par \par \par \par

## 2022-02-09 ENCOUNTER — RX RENEWAL (OUTPATIENT)
Age: 27
End: 2022-02-09

## 2022-02-22 ENCOUNTER — RESULT REVIEW (OUTPATIENT)
Age: 27
End: 2022-02-22

## 2022-02-22 ENCOUNTER — OUTPATIENT (OUTPATIENT)
Dept: OUTPATIENT SERVICES | Facility: HOSPITAL | Age: 27
LOS: 1 days | End: 2022-02-22

## 2022-02-22 ENCOUNTER — APPOINTMENT (OUTPATIENT)
Dept: MRI IMAGING | Facility: CLINIC | Age: 27
End: 2022-02-22
Payer: MEDICAID

## 2022-02-22 DIAGNOSIS — Z98.890 OTHER SPECIFIED POSTPROCEDURAL STATES: Chronic | ICD-10-CM

## 2022-02-22 PROCEDURE — 70553 MRI BRAIN STEM W/O & W/DYE: CPT | Mod: 26

## 2022-03-07 ENCOUNTER — APPOINTMENT (OUTPATIENT)
Dept: NEUROLOGY | Facility: CLINIC | Age: 27
End: 2022-03-07
Payer: MEDICAID

## 2022-03-07 VITALS
HEIGHT: 67 IN | DIASTOLIC BLOOD PRESSURE: 61 MMHG | SYSTOLIC BLOOD PRESSURE: 98 MMHG | TEMPERATURE: 98.2 F | OXYGEN SATURATION: 95 % | HEART RATE: 57 BPM

## 2022-03-07 PROCEDURE — 99215 OFFICE O/P EST HI 40 MIN: CPT

## 2022-03-07 RX ORDER — TEMOZOLOMIDE 20 MG/1
20 CAPSULE ORAL
Qty: 42 | Refills: 0 | Status: DISCONTINUED | COMMUNITY
Start: 2021-11-03 | End: 2022-03-07

## 2022-03-07 RX ORDER — TEMOZOLOMIDE 100 MG/1
100 CAPSULE ORAL
Qty: 42 | Refills: 0 | Status: DISCONTINUED | COMMUNITY
Start: 2021-11-03 | End: 2022-03-07

## 2022-03-07 NOTE — HISTORY OF PRESENT ILLNESS
[FreeTextEntry1] : In brief,\par \par Initial MRI scan when she was 14 years old, living in [place] and had fallen off of a horse. An MRI at that time showed an incidental mass and she was seen by a neurosurgeon in [place], who recommended expectant follow up and per history, radiographic changes were noted in 2016.\par \par 7/12/2018- Follow up with Dr Gardiner , no referable symptoms. AN MRI was repeated and resection was recommended.\par 10/29/2018- MRI showed a left parietal mass measuring 5.1 x 3.5 x 3.8 cm. 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 Post-operative MRI (personally reviewed) showed a GTR.\par She was then followed expectantly.\par By report, post-surgery she has 10 days of right sided weakness which then improved. She took Keppra initially which she felt made her very depressed. There was no history of seizure and Keppra was stopped.\par She has almost completed her treatment - Temodar is due to stop on the 8th and RT on the 12th. She denies any headache, nausea, seizure, or focal weakness. Weekly CBCs have help up well.\par \par 6/2019 - she had an episode of lightheadedness followed by right LE numbness and then shaking of the right LE.\par SHe was started on Lamictal - she tolerated this but reportedly did not refill.\par She then had 3 more episodes and saw Dr. Hardin in March 2020. MRI at that time showed increased flair changes anterior to the resection cavity. Lamictal was restarted with a plan to up-titrate.\par \par Seizures continued despite medication. MRI, last performed on 9/12/2021 -over time flair abnormality has progressed anteriorly and also through the posterior corpus callosum. There is no enhancement and no hyperperfusion. \par 10/15/2021 - Recommended STUPP protocol\par 11/30- RT started \par 1/12- RT Completed \par 3/7/2022 - Here for a follow up with a new MRI\par \par She has no new symptoms - she has been anxious re the MRI. She still gets occasional seizures -these occur less frequently and happen when she is sleep deprived or not eating regularly.\par

## 2022-03-07 NOTE — PHYSICAL EXAM
[FreeTextEntry1] : She is awake and alert - oriented and fluent. - 107lbs\par EOMI, VFF \par Face is symmetric and tongue protrudes midline\par There is no drift\par FFM are equal bilaterally\par Strength is full in all 4 limbs.\par No extinction to DSS.\par She is ambulating independently and steadily.

## 2022-03-07 NOTE — DISCUSSION/SUMMARY
[FreeTextEntry1] : I have reviewed personally her recent MRI scan dated 2/22 and have personally compared it to her prior exam from 9/21. Left parietal cavity is unchanged. I see no new enhancement and no change in flair signal.\par Plan is to obtain CBC and CMP today.\par WIll then order first cycle of adjuvant Temodar.\par Follow up with me in 1 month.

## 2022-03-09 LAB
ALBUMIN SERPL ELPH-MCNC: 4.8 G/DL
ALP BLD-CCNC: 55 U/L
ALT SERPL-CCNC: 11 U/L
ANION GAP SERPL CALC-SCNC: 11 MMOL/L
AST SERPL-CCNC: 13 U/L
BASOPHILS # BLD AUTO: 0.04 K/UL
BASOPHILS NFR BLD AUTO: 1 %
BILIRUB SERPL-MCNC: 0.2 MG/DL
BUN SERPL-MCNC: 15 MG/DL
CALCIUM SERPL-MCNC: 9.5 MG/DL
CHLORIDE SERPL-SCNC: 104 MMOL/L
CO2 SERPL-SCNC: 24 MMOL/L
CREAT SERPL-MCNC: 0.66 MG/DL
EGFR: 125 ML/MIN/1.73M2
EOSINOPHIL # BLD AUTO: 0.14 K/UL
EOSINOPHIL NFR BLD AUTO: 3.4 %
GLUCOSE SERPL-MCNC: 83 MG/DL
HCT VFR BLD CALC: 35.8 %
HGB BLD-MCNC: 11.7 G/DL
IMM GRANULOCYTES NFR BLD AUTO: 0.2 %
LYMPHOCYTES # BLD AUTO: 0.74 K/UL
LYMPHOCYTES NFR BLD AUTO: 18.1 %
MAN DIFF?: NORMAL
MCHC RBC-ENTMCNC: 31.9 PG
MCHC RBC-ENTMCNC: 32.7 GM/DL
MCV RBC AUTO: 97.5 FL
MONOCYTES # BLD AUTO: 0.31 K/UL
MONOCYTES NFR BLD AUTO: 7.6 %
NEUTROPHILS # BLD AUTO: 2.85 K/UL
NEUTROPHILS NFR BLD AUTO: 69.7 %
PLATELET # BLD AUTO: 182 K/UL
POTASSIUM SERPL-SCNC: 4.2 MMOL/L
PROT SERPL-MCNC: 7.3 G/DL
RBC # BLD: 3.67 M/UL
RBC # FLD: 12.6 %
SODIUM SERPL-SCNC: 139 MMOL/L
WBC # FLD AUTO: 4.09 K/UL

## 2022-03-10 ENCOUNTER — EMERGENCY (EMERGENCY)
Facility: HOSPITAL | Age: 27
LOS: 1 days | Discharge: HOME | End: 2022-03-10
Attending: EMERGENCY MEDICINE | Admitting: EMERGENCY MEDICINE
Payer: MEDICAID

## 2022-03-10 VITALS
HEART RATE: 79 BPM | OXYGEN SATURATION: 98 % | SYSTOLIC BLOOD PRESSURE: 96 MMHG | DIASTOLIC BLOOD PRESSURE: 58 MMHG | HEIGHT: 67 IN | RESPIRATION RATE: 16 BRPM | TEMPERATURE: 99 F | WEIGHT: 106.92 LBS

## 2022-03-10 DIAGNOSIS — Z98.890 OTHER SPECIFIED POSTPROCEDURAL STATES: Chronic | ICD-10-CM

## 2022-03-10 LAB
ALBUMIN SERPL ELPH-MCNC: 4 G/DL — SIGNIFICANT CHANGE UP (ref 3.4–5)
ALP SERPL-CCNC: 53 U/L — SIGNIFICANT CHANGE UP (ref 40–120)
ALT FLD-CCNC: 23 U/L — SIGNIFICANT CHANGE UP (ref 12–42)
ANION GAP SERPL CALC-SCNC: 8 MMOL/L — LOW (ref 9–16)
APPEARANCE UR: CLEAR — SIGNIFICANT CHANGE UP
AST SERPL-CCNC: 17 U/L — SIGNIFICANT CHANGE UP (ref 15–37)
BASOPHILS # BLD AUTO: 0.03 K/UL — SIGNIFICANT CHANGE UP (ref 0–0.2)
BASOPHILS NFR BLD AUTO: 0.6 % — SIGNIFICANT CHANGE UP (ref 0–2)
BILIRUB SERPL-MCNC: 0.4 MG/DL — SIGNIFICANT CHANGE UP (ref 0.2–1.2)
BILIRUB UR-MCNC: NEGATIVE — SIGNIFICANT CHANGE UP
BUN SERPL-MCNC: 14 MG/DL — SIGNIFICANT CHANGE UP (ref 7–23)
CALCIUM SERPL-MCNC: 9.1 MG/DL — SIGNIFICANT CHANGE UP (ref 8.5–10.5)
CHLORIDE SERPL-SCNC: 102 MMOL/L — SIGNIFICANT CHANGE UP (ref 96–108)
CO2 SERPL-SCNC: 30 MMOL/L — SIGNIFICANT CHANGE UP (ref 22–31)
COLOR SPEC: YELLOW — SIGNIFICANT CHANGE UP
CREAT SERPL-MCNC: 0.78 MG/DL — SIGNIFICANT CHANGE UP (ref 0.5–1.3)
D DIMER BLD IA.RAPID-MCNC: <187 NG/ML DDU — SIGNIFICANT CHANGE UP
DIFF PNL FLD: NEGATIVE — SIGNIFICANT CHANGE UP
EGFR: 108 ML/MIN/1.73M2 — SIGNIFICANT CHANGE UP
EOSINOPHIL # BLD AUTO: 0.08 K/UL — SIGNIFICANT CHANGE UP (ref 0–0.5)
EOSINOPHIL NFR BLD AUTO: 1.6 % — SIGNIFICANT CHANGE UP (ref 0–6)
FLUAV AG NPH QL: SIGNIFICANT CHANGE UP
FLUBV AG NPH QL: SIGNIFICANT CHANGE UP
GLUCOSE SERPL-MCNC: 93 MG/DL — SIGNIFICANT CHANGE UP (ref 70–99)
GLUCOSE UR QL: NEGATIVE — SIGNIFICANT CHANGE UP
HCG UR QL: NEGATIVE — SIGNIFICANT CHANGE UP
HCT VFR BLD CALC: 35.8 % — SIGNIFICANT CHANGE UP (ref 34.5–45)
HGB BLD-MCNC: 11.6 G/DL — SIGNIFICANT CHANGE UP (ref 11.5–15.5)
IMM GRANULOCYTES NFR BLD AUTO: 0.2 % — SIGNIFICANT CHANGE UP (ref 0–1.5)
KETONES UR-MCNC: NEGATIVE — SIGNIFICANT CHANGE UP
LACTATE SERPL-SCNC: 0.9 MMOL/L — SIGNIFICANT CHANGE UP (ref 0.4–2)
LEUKOCYTE ESTERASE UR-ACNC: NEGATIVE — SIGNIFICANT CHANGE UP
LIDOCAIN IGE QN: 67 U/L — LOW (ref 73–393)
LYMPHOCYTES # BLD AUTO: 0.88 K/UL — LOW (ref 1–3.3)
LYMPHOCYTES # BLD AUTO: 17.6 % — SIGNIFICANT CHANGE UP (ref 13–44)
MCHC RBC-ENTMCNC: 31.9 PG — SIGNIFICANT CHANGE UP (ref 27–34)
MCHC RBC-ENTMCNC: 32.4 GM/DL — SIGNIFICANT CHANGE UP (ref 32–36)
MCV RBC AUTO: 98.4 FL — SIGNIFICANT CHANGE UP (ref 80–100)
MONOCYTES # BLD AUTO: 0.48 K/UL — SIGNIFICANT CHANGE UP (ref 0–0.9)
MONOCYTES NFR BLD AUTO: 9.6 % — SIGNIFICANT CHANGE UP (ref 2–14)
NEUTROPHILS # BLD AUTO: 3.53 K/UL — SIGNIFICANT CHANGE UP (ref 1.8–7.4)
NEUTROPHILS NFR BLD AUTO: 70.4 % — SIGNIFICANT CHANGE UP (ref 43–77)
NITRITE UR-MCNC: NEGATIVE — SIGNIFICANT CHANGE UP
NRBC # BLD: 0 /100 WBCS — SIGNIFICANT CHANGE UP (ref 0–0)
NT-PROBNP SERPL-SCNC: 69 PG/ML — SIGNIFICANT CHANGE UP
PH UR: 7 — SIGNIFICANT CHANGE UP (ref 5–8)
PLATELET # BLD AUTO: 185 K/UL — SIGNIFICANT CHANGE UP (ref 150–400)
POTASSIUM SERPL-MCNC: 3.9 MMOL/L — SIGNIFICANT CHANGE UP (ref 3.5–5.3)
POTASSIUM SERPL-SCNC: 3.9 MMOL/L — SIGNIFICANT CHANGE UP (ref 3.5–5.3)
PROT SERPL-MCNC: 7.7 G/DL — SIGNIFICANT CHANGE UP (ref 6.4–8.2)
PROT UR-MCNC: NEGATIVE MG/DL — SIGNIFICANT CHANGE UP
RBC # BLD: 3.64 M/UL — LOW (ref 3.8–5.2)
RBC # FLD: 12.4 % — SIGNIFICANT CHANGE UP (ref 10.3–14.5)
RSV RNA NPH QL NAA+NON-PROBE: SIGNIFICANT CHANGE UP
SARS-COV-2 RNA SPEC QL NAA+PROBE: SIGNIFICANT CHANGE UP
SODIUM SERPL-SCNC: 140 MMOL/L — SIGNIFICANT CHANGE UP (ref 132–145)
SP GR SPEC: 1.01 — SIGNIFICANT CHANGE UP (ref 1–1.03)
TROPONIN I, HIGH SENSITIVITY RESULT: <4 NG/L — SIGNIFICANT CHANGE UP
TSH SERPL-MCNC: 1.57 UIU/ML — SIGNIFICANT CHANGE UP (ref 0.36–3.74)
UROBILINOGEN FLD QL: 0.2 E.U./DL — SIGNIFICANT CHANGE UP
WBC # BLD: 5.01 K/UL — SIGNIFICANT CHANGE UP (ref 3.8–10.5)
WBC # FLD AUTO: 5.01 K/UL — SIGNIFICANT CHANGE UP (ref 3.8–10.5)

## 2022-03-10 PROCEDURE — 71260 CT THORAX DX C+: CPT | Mod: 26

## 2022-03-10 PROCEDURE — 74177 CT ABD & PELVIS W/CONTRAST: CPT | Mod: 26

## 2022-03-10 PROCEDURE — 99284 EMERGENCY DEPT VISIT MOD MDM: CPT

## 2022-03-10 RX ORDER — FAMOTIDINE 10 MG/ML
20 INJECTION INTRAVENOUS ONCE
Refills: 0 | Status: COMPLETED | OUTPATIENT
Start: 2022-03-10 | End: 2022-03-10

## 2022-03-10 RX ORDER — ONDANSETRON 8 MG/1
4 TABLET, FILM COATED ORAL ONCE
Refills: 0 | Status: COMPLETED | OUTPATIENT
Start: 2022-03-10 | End: 2022-03-10

## 2022-03-10 RX ORDER — SODIUM CHLORIDE 9 MG/ML
1000 INJECTION INTRAMUSCULAR; INTRAVENOUS; SUBCUTANEOUS ONCE
Refills: 0 | Status: COMPLETED | OUTPATIENT
Start: 2022-03-10 | End: 2022-03-10

## 2022-03-10 RX ORDER — ACETAMINOPHEN 500 MG
650 TABLET ORAL ONCE
Refills: 0 | Status: COMPLETED | OUTPATIENT
Start: 2022-03-10 | End: 2022-03-10

## 2022-03-10 RX ORDER — CEFTRIAXONE 500 MG/1
1000 INJECTION, POWDER, FOR SOLUTION INTRAMUSCULAR; INTRAVENOUS ONCE
Refills: 0 | Status: COMPLETED | OUTPATIENT
Start: 2022-03-10 | End: 2022-03-10

## 2022-03-10 RX ADMIN — SODIUM CHLORIDE 1000 MILLILITER(S): 9 INJECTION INTRAMUSCULAR; INTRAVENOUS; SUBCUTANEOUS at 22:40

## 2022-03-10 RX ADMIN — ONDANSETRON 4 MILLIGRAM(S): 8 TABLET, FILM COATED ORAL at 20:32

## 2022-03-10 RX ADMIN — SODIUM CHLORIDE 1000 MILLILITER(S): 9 INJECTION INTRAMUSCULAR; INTRAVENOUS; SUBCUTANEOUS at 20:52

## 2022-03-10 RX ADMIN — Medication 650 MILLIGRAM(S): at 20:32

## 2022-03-10 RX ADMIN — CEFTRIAXONE 100 MILLIGRAM(S): 500 INJECTION, POWDER, FOR SOLUTION INTRAMUSCULAR; INTRAVENOUS at 20:33

## 2022-03-10 RX ADMIN — CEFTRIAXONE 1000 MILLIGRAM(S): 500 INJECTION, POWDER, FOR SOLUTION INTRAMUSCULAR; INTRAVENOUS at 20:52

## 2022-03-10 RX ADMIN — FAMOTIDINE 20 MILLIGRAM(S): 10 INJECTION INTRAVENOUS at 20:32

## 2022-03-10 NOTE — ED ADULT TRIAGE NOTE - CHIEF COMPLAINT QUOTE
dizziness, nausea, fatigue, x 3 days (recently completed chemo for brain tumor 2 months ago) (pmh of anemia)

## 2022-03-10 NOTE — ED PROVIDER NOTE - OBJECTIVE STATEMENT
26 yo female pt, hx of glioma? S/P resection 4 years ago, recently on surveilance MRI had recurrence and had round of chemo and radiation in January. F/U MRI normal in Feb. Presents for 3-4 days of malaise, body aches, chills, dysuria?, feels weak and lightheaded, poor apetite. Was treated for a UTI in  2 weeks ago and completed 7 days of macrobid. since then dysuria has improved. no n/v/d, no chest pain, no cough. +sob

## 2022-03-10 NOTE — ED PROVIDER NOTE - CLINICAL SUMMARY MEDICAL DECISION MAKING FREE TEXT BOX
Pt feeling unwell for several days, normal exam except for fever on rectal temp. Worked up for sepsis and so far normal labs, concern for atypical infectious process given hx of recent chemo. Added CT chest and abd for infectious work up.,

## 2022-03-10 NOTE — ED ADULT NURSE NOTE - OBJECTIVE STATEMENT
pt presents with onset of dizziness today on standing and sitting on the back of recent chemotherapy and radiation for benign tumour, finished treatment 3 months ago, had mri last month given all clear but has seizures post op, on keppra, compliant , also has recent uti , on po abs last week, gcs 15 , answering all questions appropriately, mother in attendance

## 2022-03-10 NOTE — ED PROVIDER NOTE - PATIENT PORTAL LINK FT
You can access the FollowMyHealth Patient Portal offered by [facility] by registering at the following website: [email or web address]. By joining [employer]’s FollowMyHealth portal, you will also be able to view your health information using other applications (apps) compatible with our system.

## 2022-03-10 NOTE — ED PROVIDER NOTE - PROGRESS NOTE DETAILS
informed patient or preliminary negative results, would like to eat. well appearing, nontoxic, afebrile, stable vs. given strict return precautions. requested carboxyhg level, as she notes she lives [place], and possible exposure causing her lightheadedness which she notes is not related to her prior tumor symptoms. patient has follow up. marcello thomason.

## 2022-03-11 VITALS
OXYGEN SATURATION: 98 % | HEART RATE: 56 BPM | SYSTOLIC BLOOD PRESSURE: 100 MMHG | DIASTOLIC BLOOD PRESSURE: 65 MMHG | TEMPERATURE: 98 F | RESPIRATION RATE: 18 BRPM

## 2022-03-11 LAB — COHGB MFR BLDV: 0 % — SIGNIFICANT CHANGE UP

## 2022-03-14 ENCOUNTER — NON-APPOINTMENT (OUTPATIENT)
Age: 27
End: 2022-03-14

## 2022-03-14 DIAGNOSIS — R42 DIZZINESS AND GIDDINESS: ICD-10-CM

## 2022-03-14 DIAGNOSIS — R53.81 OTHER MALAISE: ICD-10-CM

## 2022-03-14 DIAGNOSIS — R06.02 SHORTNESS OF BREATH: ICD-10-CM

## 2022-03-14 DIAGNOSIS — Z20.822 CONTACT WITH AND (SUSPECTED) EXPOSURE TO COVID-19: ICD-10-CM

## 2022-03-14 DIAGNOSIS — Z88.0 ALLERGY STATUS TO PENICILLIN: ICD-10-CM

## 2022-03-14 DIAGNOSIS — Z88.2 ALLERGY STATUS TO SULFONAMIDES: ICD-10-CM

## 2022-03-14 DIAGNOSIS — R50.9 FEVER, UNSPECIFIED: ICD-10-CM

## 2022-03-14 DIAGNOSIS — Z91.048 OTHER NONMEDICINAL SUBSTANCE ALLERGY STATUS: ICD-10-CM

## 2022-03-16 LAB
CULTURE RESULTS: SIGNIFICANT CHANGE UP
CULTURE RESULTS: SIGNIFICANT CHANGE UP
SPECIMEN SOURCE: SIGNIFICANT CHANGE UP
SPECIMEN SOURCE: SIGNIFICANT CHANGE UP

## 2022-03-22 LAB
BASOPHILS # BLD AUTO: 0.03 K/UL
BASOPHILS NFR BLD AUTO: 0.8 %
EOSINOPHIL # BLD AUTO: 0.17 K/UL
EOSINOPHIL NFR BLD AUTO: 4.3 %
HCT VFR BLD CALC: 37.6 %
HGB BLD-MCNC: 11.9 G/DL
IMM GRANULOCYTES NFR BLD AUTO: 0.3 %
LYMPHOCYTES # BLD AUTO: 0.5 K/UL
LYMPHOCYTES NFR BLD AUTO: 12.6 %
MAN DIFF?: NORMAL
MCHC RBC-ENTMCNC: 31.6 GM/DL
MCHC RBC-ENTMCNC: 31.8 PG
MCV RBC AUTO: 100.5 FL
MONOCYTES # BLD AUTO: 0.29 K/UL
MONOCYTES NFR BLD AUTO: 7.3 %
NEUTROPHILS # BLD AUTO: 2.97 K/UL
NEUTROPHILS NFR BLD AUTO: 74.7 %
PLATELET # BLD AUTO: 183 K/UL
RBC # BLD: 3.74 M/UL
RBC # FLD: 13.1 %
WBC # FLD AUTO: 3.97 K/UL

## 2022-03-30 LAB
BASOPHILS # BLD AUTO: 0.03 K/UL
BASOPHILS NFR BLD AUTO: 0.5 %
EOSINOPHIL # BLD AUTO: 0.14 K/UL
EOSINOPHIL NFR BLD AUTO: 2.4 %
HCT VFR BLD CALC: 37 %
HGB BLD-MCNC: 11.6 G/DL
IMM GRANULOCYTES NFR BLD AUTO: 0.3 %
LYMPHOCYTES # BLD AUTO: 0.94 K/UL
LYMPHOCYTES NFR BLD AUTO: 16.3 %
MAN DIFF?: NORMAL
MCHC RBC-ENTMCNC: 31.4 GM/DL
MCHC RBC-ENTMCNC: 31.4 PG
MCV RBC AUTO: 100.3 FL
MONOCYTES # BLD AUTO: 0.38 K/UL
MONOCYTES NFR BLD AUTO: 6.6 %
NEUTROPHILS # BLD AUTO: 4.27 K/UL
NEUTROPHILS NFR BLD AUTO: 73.9 %
PLATELET # BLD AUTO: 209 K/UL
RBC # BLD: 3.69 M/UL
RBC # FLD: 13.3 %
WBC # FLD AUTO: 5.78 K/UL

## 2022-03-30 NOTE — HISTORY OF PRESENT ILLNESS
[FreeTextEntry1] : Ms.Marie Dyer is a 27 year old female who has completed 30 Fx or 54 Gy to Partial brain (parietal lobe ) from 11/30/2021 to 01/14/2022.\par \par 03/30/2022: PTE: \par MRI Head done on 02/22/2022.Evaluated by Dr Olivares on 03/07/2022 and first cycle of adjuvant Temodar to begin.\par \par MRI Head on 02/22/2022:\par IMPRESSION:\par Stable MR appearance of the postoperative left parietal lobe. No convincing progression of FLAIR signal to indicate recurrence and/or progression of tumor. Stable linear enhancement at the operative bed; no enhancing mass.\par \par 1/11/22: FINAL OTV - Patient has completed (26/30fx) 47Gy/54 Gy to the Partial Brain (parietal lobe).Today, she continues to do well with minimal reactions. Moderate hair loss on occipital and parietal area of head.. She denies headache, nausea. Continue chemo RT as planned. MRI to be scheduled one month post RT treatment.\par \par 1/4/2021-OTV- Patient has completed (24/30fx) 43Gy/54 Gy to the Partial Brain (parietal lobe).Today, she continues to do well with minimal reactions. Moderate hair loss on occipital and parietal area of head.. She denies headache, nausea. Continue chemo RT as planned.\par \par 12/28/21-OTV- Patient has completed (20/30fx) 36Gy/54 Gy to the Partial Brain (parietal lobe).Today, she continues to do well with minimal reactions. Moderate hair loss on occipital and parietal area of head.. She denies headache, nausea. Continue chemo RT as planned.\par \par 12/21/21-OTV-  Patient has completed (16/30fx) 28Gy/54 Gy to the Partial Brain (parietal lobe).  Today, she continues to do well with minimal reactions. Minimal hair loss. She denies headache, nausea. Continue chemo RT as planned\par \par 12/14/21- OTV -  Patient has completed (11/30fx) 26Gy/54 Gy to the Partial Brain (parietal lobe). Today, she continues to do well with minimal reactions: only mild fatigue. She denies hair loss, headache, nausea. Blood work is checked and is WNL. Continue chemo RT as planned\par \par 12/7/21 - OTV - The patient has completed (7/30fx) 1080 cGy / 5400 cGy to the Partial Brain (parietal lobe).. Today, she notes that she feels well. No neurological changes are noted. She has no skin or hair loss changes noted. Phylicia reports mild fatigue.  I reviewed blood work (CBC from 12/6) which is WNL. Will continue ChemoRT as planned.\par \par 11/30/21: FIRST OTV - Patient has completed 1/30 fx or 1.8/54 Gy to partial brain. Today patient has no complaints. No neurological changes noted. Denies any headache or visionary changes. Denies any pain in the mouth, with no redness noted. She has no skin changes noted. Denies any pain when swallowing or sore throat, continues to eat well. On concurrent chemotherapy.\par \par \par Ms. Diaz is a 27 yo woman who was found to have a brain tumor when she was 14 years old, living in [place] and had fallen off of a horse. An MRI at that time showed an incidental mass and it was followed as  per the patient's report. Radiographic changes were noted in 2016. She initially saw Dr. Gardiner on 7/12/2018 - at that time she had no referable symptoms. Am MRI was repeated and resection was recommended. The MRI dated 10/29/2018 demonstrates left parietal mass measuring 5.1 x 3.5 x 3.8 cm. This pre-operative scan was without contrast although by chart review - her previous scans that had been performed with contrast showed that this lesion was non-enhancing.\par \par Dr. 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 Post-operative MRI  showed a GTR. She was then followed expectantly.By report, post-surgery she has 10 days of right sided weakness which then improved. She took Keppra initially which she felt made her very depressed. There was no history of seizure and Keppra was stopped.\par In June, 2019 she had an episode of lightheadedness followed by right LE numbness and then shaking of the right LE.\par SHe was started on Lamictal - she tolerated this but reportedly did not refill.\par She then had 3 more episodes and saw Dr. Hardin in March 2020. MRI at that time showed increased flair changes anterior to the resection cavity. Lamictal was restarted with a plan to up-titrate.\par \par Seizures continued despite medication. MRI, last performed on 9/12/2021 - personally reviewed - over time flair abnormality has progressed anteriorly and also through the posterior corpus callosum. There is no enhancement and no hyperperfusion.\par ON CONCURRENT CHEMOTHERAPY FOR 42 DAYS.\par \par \par \par \par \par

## 2022-03-30 NOTE — DISEASE MANAGEMENT
[Pathological] : TNM Stage: p [N/A] : Currently not applicable [TTNM] : - [NTNM] : -- [MTNM] : - [de-identified] : 1080 [de-identified] : 0554 [de-identified] : Partial Brain

## 2022-03-30 NOTE — PHYSICAL EXAM
[Normal] : normoactive bowel sounds, soft and nontender, no hepatosplenomegaly or masses appreciated [Oriented To Time, Place, And Person] : oriented to person, place, and time [de-identified] : Alopecia

## 2022-03-30 NOTE — REASON FOR VISIT
[Post-Treatment Evaluation] : post-treatment evaluation for [Brain Tumor] : brain tumor [Friend] : friend

## 2022-03-31 ENCOUNTER — APPOINTMENT (OUTPATIENT)
Dept: RADIATION ONCOLOGY | Facility: CLINIC | Age: 27
End: 2022-03-31

## 2022-04-04 ENCOUNTER — APPOINTMENT (OUTPATIENT)
Dept: NEUROLOGY | Facility: CLINIC | Age: 27
End: 2022-04-04
Payer: MEDICAID

## 2022-04-04 PROCEDURE — 99215 OFFICE O/P EST HI 40 MIN: CPT | Mod: 95

## 2022-04-04 NOTE — PHYSICAL EXAM
[FreeTextEntry1] : Exam limited via telehealth\par \par She is awake and alert - oriented and fluent\par Face is symmetric and tongue protrudes midline\par There is no drift\par FFM are equal bilaterally\par Strength appears  full in all 4 limbs.\par She is ambulating independently and steadily.

## 2022-04-04 NOTE — HISTORY OF PRESENT ILLNESS
[FreeTextEntry1] : Patient was seen via Telehealth. She was in her home in [facility] and I was in the office at [facility]. 2 way audio and visual technique was utilized and prior consent was obtained.\par \par \par Initial MRI scan when she was 14 years old, living in [place] and had fallen off of a horse. An MRI at that time showed an incidental mass and she was seen by a neurosurgeon in [place], who recommended expectant follow up and per history, radiographic changes were noted in 2016.\par \par 7/12/2018- Follow up with Dr Gardiner , no referable symptoms. AN MRI was repeated and resection was recommended.\par 10/29/2018- MRI showed a left parietal mass measuring 5.1 x 3.5 x 3.8 cm. 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 Post-operative MRI (personally reviewed) showed a GTR.\par She was then followed expectantly.\par By report, post-surgery she has 10 days of right sided weakness which then improved. She took Keppra initially which she felt made her very depressed. There was no history of seizure and Keppra was stopped.\par She has almost completed her treatment - Temodar is due to stop on the 8th and RT on the 12th. She denies any headache, nausea, seizure, or focal weakness. Weekly CBCs have help up well.\par \par 6/2019 - she had an episode of lightheadedness followed by right LE numbness and then shaking of the right LE.\par SHe was started on Lamictal - she tolerated this but reportedly did not refill.\par She then had 3 more episodes and saw Dr. Hardin in March 2020. MRI at that time showed increased flair changes anterior to the resection cavity. Lamictal was restarted with a plan to up-titrate.\par \par Seizures continued despite medication. MRI, last performed on 9/12/2021 -over time flair abnormality has progressed anteriorly and also through the posterior corpus callosum. There is no enhancement and no hyperperfusion. \par 10/15/2021 - Recommended STUPP protocol\par 11/30- RT started \par 1/12- RT Completed \par 3/7/2022 - MRI stable\par 3/14-3/18- TMZ first cycle \par \par \par She reports that she tolerated this well. Her mood and energy has improved. She recently returned from a trip to [place] to visit her family. She denies any headache, focal weakness, numbness, gait issue or seizure.\par

## 2022-04-04 NOTE — DISCUSSION/SUMMARY
[FreeTextEntry1] : In summary, this is a 28 yo woman s/p cycle 1 TMZ -for progressive astrocytoma. thus far cbc stable.\par Will check CBC and CMP tomorrow.\par If ok her 2nd cycle will be due 4/11 at 200 mg/m2.\par She will continue to obtain weekly CBCs - plan will be to obtain MRI May 12 and see me May 13.\par If she has any issues in the interim, she knows to call me.

## 2022-04-07 LAB
ALBUMIN SERPL ELPH-MCNC: 4.7 G/DL
ALP BLD-CCNC: 65 U/L
ALT SERPL-CCNC: 8 U/L
ANION GAP SERPL CALC-SCNC: 14 MMOL/L
AST SERPL-CCNC: 14 U/L
BASOPHILS # BLD AUTO: 0.02 K/UL
BASOPHILS NFR BLD AUTO: 0.4 %
BILIRUB SERPL-MCNC: 0.3 MG/DL
BUN SERPL-MCNC: 12 MG/DL
CALCIUM SERPL-MCNC: 9.8 MG/DL
CHLORIDE SERPL-SCNC: 104 MMOL/L
CO2 SERPL-SCNC: 25 MMOL/L
CREAT SERPL-MCNC: 0.79 MG/DL
EGFR: 106 ML/MIN/1.73M2
EOSINOPHIL # BLD AUTO: 0.18 K/UL
EOSINOPHIL NFR BLD AUTO: 3.2 %
GLUCOSE SERPL-MCNC: 64 MG/DL
HCT VFR BLD CALC: 38.5 %
HGB BLD-MCNC: 11.9 G/DL
IMM GRANULOCYTES NFR BLD AUTO: 0.4 %
LYMPHOCYTES # BLD AUTO: 0.68 K/UL
LYMPHOCYTES NFR BLD AUTO: 12.1 %
MAN DIFF?: NORMAL
MCHC RBC-ENTMCNC: 30.8 PG
MCHC RBC-ENTMCNC: 30.9 GM/DL
MCV RBC AUTO: 99.7 FL
MONOCYTES # BLD AUTO: 0.46 K/UL
MONOCYTES NFR BLD AUTO: 8.2 %
NEUTROPHILS # BLD AUTO: 4.27 K/UL
NEUTROPHILS NFR BLD AUTO: 75.7 %
PLATELET # BLD AUTO: 182 K/UL
POTASSIUM SERPL-SCNC: 4.4 MMOL/L
PROT SERPL-MCNC: 7.7 G/DL
RBC # BLD: 3.86 M/UL
RBC # FLD: 13.2 %
SODIUM SERPL-SCNC: 142 MMOL/L
WBC # FLD AUTO: 5.63 K/UL

## 2022-04-19 RX ORDER — TEMOZOLOMIDE 100 MG/1
100 CAPSULE ORAL AT BEDTIME
Qty: 10 | Refills: 0 | Status: DISCONTINUED | COMMUNITY
Start: 2022-03-08 | End: 2022-04-19

## 2022-04-19 RX ORDER — TEMOZOLOMIDE 5 MG/1
5 CAPSULE ORAL AT BEDTIME
Qty: 15 | Refills: 0 | Status: DISCONTINUED | COMMUNITY
Start: 2022-03-08 | End: 2022-04-19

## 2022-04-19 RX ORDER — TEMOZOLOMIDE 20 MG/1
20 CAPSULE ORAL AT BEDTIME
Qty: 5 | Refills: 0 | Status: DISCONTINUED | COMMUNITY
Start: 2022-03-08 | End: 2022-04-19

## 2022-04-22 ENCOUNTER — NON-APPOINTMENT (OUTPATIENT)
Age: 27
End: 2022-04-22

## 2022-05-06 ENCOUNTER — NON-APPOINTMENT (OUTPATIENT)
Age: 27
End: 2022-05-06

## 2022-05-12 ENCOUNTER — APPOINTMENT (OUTPATIENT)
Dept: MRI IMAGING | Facility: HOSPITAL | Age: 27
End: 2022-05-12
Payer: MEDICAID

## 2022-05-12 ENCOUNTER — OUTPATIENT (OUTPATIENT)
Dept: OUTPATIENT SERVICES | Facility: HOSPITAL | Age: 27
LOS: 1 days | End: 2022-05-12
Payer: MEDICAID

## 2022-05-12 DIAGNOSIS — Z98.890 OTHER SPECIFIED POSTPROCEDURAL STATES: Chronic | ICD-10-CM

## 2022-05-12 PROCEDURE — A9585: CPT

## 2022-05-12 PROCEDURE — 70553 MRI BRAIN STEM W/O & W/DYE: CPT | Mod: 26

## 2022-05-12 PROCEDURE — 70553 MRI BRAIN STEM W/O & W/DYE: CPT

## 2022-05-13 ENCOUNTER — NON-APPOINTMENT (OUTPATIENT)
Age: 27
End: 2022-05-13

## 2022-05-13 ENCOUNTER — APPOINTMENT (OUTPATIENT)
Dept: NEUROLOGY | Facility: CLINIC | Age: 27
End: 2022-05-13
Payer: MEDICAID

## 2022-05-13 VITALS
OXYGEN SATURATION: 100 % | WEIGHT: 108 LBS | HEART RATE: 78 BPM | SYSTOLIC BLOOD PRESSURE: 105 MMHG | HEIGHT: 67 IN | BODY MASS INDEX: 16.95 KG/M2 | DIASTOLIC BLOOD PRESSURE: 68 MMHG | TEMPERATURE: 98.2 F

## 2022-05-13 LAB
BASOPHILS # BLD AUTO: 0 K/UL
BASOPHILS NFR BLD AUTO: 0 %
EOSINOPHIL # BLD AUTO: 0.19 K/UL
EOSINOPHIL NFR BLD AUTO: 3.6 %
HCT VFR BLD CALC: 35.2 %
HGB BLD-MCNC: 11.1 G/DL
IMM GRANULOCYTES NFR BLD AUTO: 0.2 %
LYMPHOCYTES # BLD AUTO: 0.51 K/UL
LYMPHOCYTES NFR BLD AUTO: 9.7 %
MAN DIFF?: NORMAL
MCHC RBC-ENTMCNC: 31.1 PG
MCHC RBC-ENTMCNC: 31.5 GM/DL
MCV RBC AUTO: 98.6 FL
MONOCYTES # BLD AUTO: 0.45 K/UL
MONOCYTES NFR BLD AUTO: 8.5 %
NEUTROPHILS # BLD AUTO: 4.11 K/UL
NEUTROPHILS NFR BLD AUTO: 78 %
PLATELET # BLD AUTO: 180 K/UL
RBC # BLD: 3.57 M/UL
RBC # FLD: 13.1 %
WBC # FLD AUTO: 5.27 K/UL

## 2022-05-13 PROCEDURE — 99215 OFFICE O/P EST HI 40 MIN: CPT

## 2022-05-13 PROCEDURE — 99213 OFFICE O/P EST LOW 20 MIN: CPT

## 2022-05-13 NOTE — HISTORY OF PRESENT ILLNESS
[FreeTextEntry1] : Patient is being seen in neuro oncologic follow up. She has a new MRI for review.\par \par \par Initial MRI scan when she was 14 years old, living in [place] and had fallen off of a horse. An MRI at that time showed an incidental mass and she was seen by a neurosurgeon in [place], who recommended expectant follow up and per history, radiographic changes were noted in 2016.\par \par 7/12/2018- Follow up with Dr Gardiner , no referable symptoms. AN MRI was repeated and resection was recommended.\par 10/29/2018- MRI showed a left parietal mass measuring 5.1 x 3.5 x 3.8 cm. 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 Post-operative MRI (personally reviewed) showed a GTR.\par She was then followed expectantly.\par By report, post-surgery she has 10 days of right sided weakness which then improved. She took Keppra initially which she felt made her very depressed. There was no history of seizure and Keppra was stopped.\par She has almost completed her treatment - Temodar is due to stop on the 8th and RT on the 12th. She denies any headache, nausea, seizure, or focal weakness. Weekly CBCs have help up well.\par \par 6/2019 - she had an episode of lightheadedness followed by right LE numbness and then shaking of the right LE.\par SHe was started on Lamictal - she tolerated this but reportedly did not refill.\par She then had 3 more episodes and saw Dr. Hardin in March 2020. MRI at that time showed increased flair changes anterior to the resection cavity. Lamictal was restarted with a plan to up-titrate.\par \par Seizures continued despite medication. MRI, last performed on 9/12/2021 -over time flair abnormality has progressed anteriorly and also through the posterior corpus callosum. There is no enhancement and no hyperperfusion. \par 10/15/2021 - Recommended STUPP protocol\par 11/30- RT started \par 1/12- RT Completed \par 3/7/2022 - MRI stable\par 3/14-3/18- TMZ first cycle \par 4/22-4/26 TMZ cycle 2\par \par She offers no new complaints - remains seizure free - no headaches and is tolerating treatment well.\par She has completed her book as well.\par \par \par She reports that she tolerated this well. Her mood and energy has improved. She recently returned from a trip to [place] to visit her family. She denies any headache, focal weakness, numbness, gait issue or seizure.\par

## 2022-05-13 NOTE — DISCUSSION/SUMMARY
[FreeTextEntry1] : I have reviewed her MRI from 5/12 and have compared it personally to her prior exam dated 2/22. To my review, there is no new enhancement seen and stable left parietal operative cavity with unchanged flair. The official report including perfusion results are pending.\par Will follow up above.\par CBC and CMP\par Weekly CBC reinforced.\par Cycle #3 will be ordered pending above.

## 2022-05-13 NOTE — PHYSICAL EXAM
[FreeTextEntry1] : She is awake and alert - oriented and fluent with normal reception.\par PERRL, EOMI, VFF\par Face symmetric and tongue midline\par No drift\par FFM equal and strength full throughout\par Herrera intact\par Ambulating independently and steadily.

## 2022-05-16 LAB
ALBUMIN SERPL ELPH-MCNC: 4.8 G/DL
ALP BLD-CCNC: 70 U/L
ALT SERPL-CCNC: 14 U/L
ANION GAP SERPL CALC-SCNC: 13 MMOL/L
AST SERPL-CCNC: 13 U/L
BASOPHILS # BLD AUTO: 0 K/UL
BASOPHILS NFR BLD AUTO: 0 %
BILIRUB SERPL-MCNC: 0.4 MG/DL
BUN SERPL-MCNC: 18 MG/DL
CALCIUM SERPL-MCNC: 9.9 MG/DL
CHLORIDE SERPL-SCNC: 103 MMOL/L
CO2 SERPL-SCNC: 24 MMOL/L
CREAT SERPL-MCNC: 0.68 MG/DL
EGFR: 124 ML/MIN/1.73M2
EOSINOPHIL # BLD AUTO: 0.25 K/UL
EOSINOPHIL NFR BLD AUTO: 5.2 %
GLUCOSE SERPL-MCNC: 81 MG/DL
HCT VFR BLD CALC: 37.5 %
HGB BLD-MCNC: 11.9 G/DL
IMM GRANULOCYTES NFR BLD AUTO: 0.2 %
LYMPHOCYTES # BLD AUTO: 0.54 K/UL
LYMPHOCYTES NFR BLD AUTO: 11.3 %
MAN DIFF?: NORMAL
MCHC RBC-ENTMCNC: 31.6 PG
MCHC RBC-ENTMCNC: 31.7 GM/DL
MCV RBC AUTO: 99.5 FL
MONOCYTES # BLD AUTO: 0.33 K/UL
MONOCYTES NFR BLD AUTO: 6.9 %
NEUTROPHILS # BLD AUTO: 3.67 K/UL
NEUTROPHILS NFR BLD AUTO: 76.4 %
PLATELET # BLD AUTO: 129 K/UL
POTASSIUM SERPL-SCNC: 4.4 MMOL/L
PROT SERPL-MCNC: 7.3 G/DL
RBC # BLD: 3.77 M/UL
RBC # FLD: 12.7 %
SODIUM SERPL-SCNC: 140 MMOL/L
WBC # FLD AUTO: 4.8 K/UL

## 2022-05-16 RX ORDER — TEMOZOLOMIDE 5 MG/1
5 CAPSULE ORAL
Qty: 10 | Refills: 0 | Status: DISCONTINUED | COMMUNITY
Start: 2022-04-19 | End: 2022-05-16

## 2022-05-16 RX ORDER — TEMOZOLOMIDE 100 MG/1
100 CAPSULE ORAL
Qty: 15 | Refills: 0 | Status: DISCONTINUED | COMMUNITY
Start: 2022-04-19 | End: 2022-05-16

## 2022-05-20 ENCOUNTER — NON-APPOINTMENT (OUTPATIENT)
Age: 27
End: 2022-05-20

## 2022-05-24 ENCOUNTER — NON-APPOINTMENT (OUTPATIENT)
Age: 27
End: 2022-05-24

## 2022-05-24 LAB
BASOPHILS # BLD AUTO: 0.02 K/UL
BASOPHILS # BLD AUTO: 0.02 K/UL
BASOPHILS NFR BLD AUTO: 0.4 %
BASOPHILS NFR BLD AUTO: 0.4 %
EOSINOPHIL # BLD AUTO: 0.07 K/UL
EOSINOPHIL # BLD AUTO: 0.25 K/UL
EOSINOPHIL NFR BLD AUTO: 1.4 %
EOSINOPHIL NFR BLD AUTO: 5.6 %
HCT VFR BLD CALC: 36 %
HCT VFR BLD CALC: 36.2 %
HGB BLD-MCNC: 11.6 G/DL
HGB BLD-MCNC: 11.6 G/DL
IMM GRANULOCYTES NFR BLD AUTO: 0.2 %
IMM GRANULOCYTES NFR BLD AUTO: 0.4 %
LYMPHOCYTES # BLD AUTO: 0.56 K/UL
LYMPHOCYTES # BLD AUTO: 0.8 K/UL
LYMPHOCYTES NFR BLD AUTO: 12.6 %
LYMPHOCYTES NFR BLD AUTO: 16.2 %
MAN DIFF?: NORMAL
MAN DIFF?: NORMAL
MCHC RBC-ENTMCNC: 31.4 PG
MCHC RBC-ENTMCNC: 31.8 PG
MCHC RBC-ENTMCNC: 32 GM/DL
MCHC RBC-ENTMCNC: 32.2 GM/DL
MCV RBC AUTO: 97.8 FL
MCV RBC AUTO: 98.6 FL
MONOCYTES # BLD AUTO: 0.32 K/UL
MONOCYTES # BLD AUTO: 0.46 K/UL
MONOCYTES NFR BLD AUTO: 7.2 %
MONOCYTES NFR BLD AUTO: 9.3 %
NEUTROPHILS # BLD AUTO: 3.29 K/UL
NEUTROPHILS # BLD AUTO: 3.57 K/UL
NEUTROPHILS NFR BLD AUTO: 72.5 %
NEUTROPHILS NFR BLD AUTO: 73.8 %
PLATELET # BLD AUTO: 198 K/UL
PLATELET # BLD AUTO: 89 K/UL
RBC # BLD: 3.65 M/UL
RBC # BLD: 3.7 M/UL
RBC # FLD: 13.3 %
RBC # FLD: 13.7 %
WBC # FLD AUTO: 4.46 K/UL
WBC # FLD AUTO: 4.93 K/UL

## 2022-05-31 ENCOUNTER — RX RENEWAL (OUTPATIENT)
Age: 27
End: 2022-05-31

## 2022-06-03 ENCOUNTER — APPOINTMENT (OUTPATIENT)
Dept: NEUROLOGY | Facility: CLINIC | Age: 27
End: 2022-06-03
Payer: MEDICAID

## 2022-06-03 LAB
ALBUMIN SERPL ELPH-MCNC: 4.6 G/DL
ALP BLD-CCNC: 59 U/L
ALT SERPL-CCNC: 15 U/L
ANION GAP SERPL CALC-SCNC: 13 MMOL/L
AST SERPL-CCNC: 16 U/L
BASOPHILS # BLD AUTO: 0.04 K/UL
BASOPHILS NFR BLD AUTO: 1.1 %
BILIRUB SERPL-MCNC: 0.4 MG/DL
BUN SERPL-MCNC: 12 MG/DL
CALCIUM SERPL-MCNC: 9.5 MG/DL
CHLORIDE SERPL-SCNC: 103 MMOL/L
CO2 SERPL-SCNC: 24 MMOL/L
CREAT SERPL-MCNC: 0.79 MG/DL
EGFR: 106 ML/MIN/1.73M2
EOSINOPHIL # BLD AUTO: 0.1 K/UL
EOSINOPHIL NFR BLD AUTO: 2.8 %
GLUCOSE SERPL-MCNC: 79 MG/DL
HCT VFR BLD CALC: 33.8 %
HGB BLD-MCNC: 10.7 G/DL
IMM GRANULOCYTES NFR BLD AUTO: 0.3 %
LYMPHOCYTES # BLD AUTO: 0.52 K/UL
LYMPHOCYTES NFR BLD AUTO: 14.5 %
MAN DIFF?: NORMAL
MCHC RBC-ENTMCNC: 31.7 GM/DL
MCHC RBC-ENTMCNC: 31.8 PG
MCV RBC AUTO: 100.6 FL
MONOCYTES # BLD AUTO: 0.35 K/UL
MONOCYTES NFR BLD AUTO: 9.8 %
NEUTROPHILS # BLD AUTO: 2.56 K/UL
NEUTROPHILS NFR BLD AUTO: 71.5 %
PLATELET # BLD AUTO: 197 K/UL
POTASSIUM SERPL-SCNC: 4.2 MMOL/L
PROT SERPL-MCNC: 7 G/DL
RBC # BLD: 3.36 M/UL
RBC # FLD: 14.1 %
SODIUM SERPL-SCNC: 140 MMOL/L
WBC # FLD AUTO: 3.58 K/UL

## 2022-06-03 PROCEDURE — 99214 OFFICE O/P EST MOD 30 MIN: CPT | Mod: 95

## 2022-06-03 NOTE — PHYSICAL EXAM
[FreeTextEntry1] : Alert, awake , Oriented X 3. Speech clear and fluent\par  EOMI\par Face symmetrical. Tongue protrudes midline\par WERNER x 4 \par FFM, coordination equal bilaterally\par Gait steady. Ambulating independently\par  [General Appearance - Alert] : alert [General Appearance - In No Acute Distress] : in no acute distress [] : no respiratory distress

## 2022-06-03 NOTE — DISCUSSION/SUMMARY
[FreeTextEntry1] : Patient seen via tele health\par GLIOMA - Completed 3 cycles of TMZ thus far. BW stable thus far. Will repeat CBC on 6/13 ( Script emailed, she knows it to fax over to our office)\par Will order 4th cycle after reviewing BW - Next cycle due on 6/21/2022\par Plan : MRI and follow up on 7/15/2022 . Orders placed, she will let our office if any questions or concerns.

## 2022-06-03 NOTE — REASON FOR VISIT
[Home] : at home, [unfilled] , at the time of the visit. [Medical Office: ([facility])___] : at the medical office located in  [Patient] : the patient [Follow-Up: _____] : a [unfilled] follow-up visit

## 2022-06-03 NOTE — HISTORY OF PRESENT ILLNESS
[FreeTextEntry1] : Patient is being seen via telehealth visit for a follow up\par \par \par Initial MRI scan when she was 14 years old, living in [place] and had fallen off of a horse. An MRI at that time showed an incidental mass and she was seen by a neurosurgeon in [place], who recommended expectant follow up and per history, radiographic changes were noted in 2016.\par \par 7/12/2018- Follow up with Dr Gardiner , no referable symptoms. AN MRI was repeated and resection was recommended.\par 10/29/2018- MRI showed a left parietal mass measuring 5.1 x 3.5 x 3.8 cm. 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 Post-operative MRI (personally reviewed) showed a GTR.\par She was then followed expectantly.\par By report, post-surgery she has 10 days of right sided weakness which then improved. She took Keppra initially which she felt made her very depressed. There was no history of seizure and Keppra was stopped.\par She has almost completed her treatment - Temodar is due to stop on the 8th and RT on the 12th. She denies any headache, nausea, seizure, or focal weakness. Weekly CBCs have help up well.\par \par 6/2019 - she had an episode of lightheadedness followed by right LE numbness and then shaking of the right LE.\par SHe was started on Lamictal - she tolerated this but reportedly did not refill.\par She then had 3 more episodes and saw Dr. Hardin in March 2020. MRI at that time showed increased flair changes anterior to the resection cavity. Lamictal was restarted with a plan to up-titrate.\par \par Seizures continued despite medication. MRI, last performed on 9/12/2021 -over time flair abnormality has progressed anteriorly and also through the posterior corpus callosum. There is no enhancement and no hyperperfusion. \par 10/15/2021 - Recommended STUPP protocol\par 11/30- RT started \par 1/12- RT Completed \par 3/7/2022 - MRI stable\par 3/14-3/18- TMZ first cycle \par 4/22-4/26 TMZ cycle 2\par 5/24-5/28 - TMZ 3rd cycle\par \par 6/3/2022 She offers no new complaints - remains seizure free - no headaches and is tolerating treatment well. She is travelling to [place] today for a month as part of her job.\par \par \par \par She denies any headache, focal weakness, numbness, gait issue or seizure.\par \par  \par

## 2022-06-21 RX ORDER — TEMOZOLOMIDE 5 MG/1
5 CAPSULE ORAL
Qty: 10 | Refills: 0 | Status: DISCONTINUED | COMMUNITY
Start: 2022-05-16 | End: 2022-06-21

## 2022-06-21 RX ORDER — TEMOZOLOMIDE 100 MG/1
100 CAPSULE ORAL
Qty: 15 | Refills: 0 | Status: DISCONTINUED | COMMUNITY
Start: 2022-05-16 | End: 2022-06-21

## 2022-06-24 ENCOUNTER — NON-APPOINTMENT (OUTPATIENT)
Age: 27
End: 2022-06-24

## 2022-06-28 ENCOUNTER — NON-APPOINTMENT (OUTPATIENT)
Age: 27
End: 2022-06-28

## 2022-07-13 ENCOUNTER — APPOINTMENT (OUTPATIENT)
Dept: MRI IMAGING | Facility: CLINIC | Age: 27
End: 2022-07-13

## 2022-07-13 ENCOUNTER — APPOINTMENT (OUTPATIENT)
Dept: NEUROLOGY | Facility: CLINIC | Age: 27
End: 2022-07-13

## 2022-07-13 ENCOUNTER — APPOINTMENT (OUTPATIENT)
Dept: MRI IMAGING | Facility: IMAGING CENTER | Age: 27
End: 2022-07-13

## 2022-07-13 ENCOUNTER — NON-APPOINTMENT (OUTPATIENT)
Age: 27
End: 2022-07-13

## 2022-07-13 ENCOUNTER — OUTPATIENT (OUTPATIENT)
Dept: OUTPATIENT SERVICES | Facility: HOSPITAL | Age: 27
LOS: 1 days | End: 2022-07-13
Payer: MEDICAID

## 2022-07-13 VITALS
SYSTOLIC BLOOD PRESSURE: 106 MMHG | HEART RATE: 92 BPM | WEIGHT: 106 LBS | DIASTOLIC BLOOD PRESSURE: 68 MMHG | RESPIRATION RATE: 16 BRPM | OXYGEN SATURATION: 98 % | TEMPERATURE: 98 F | BODY MASS INDEX: 16.64 KG/M2 | HEIGHT: 67 IN

## 2022-07-13 DIAGNOSIS — Z98.890 OTHER SPECIFIED POSTPROCEDURAL STATES: Chronic | ICD-10-CM

## 2022-07-13 DIAGNOSIS — C71.9 MALIGNANT NEOPLASM OF BRAIN, UNSPECIFIED: ICD-10-CM

## 2022-07-13 PROCEDURE — 99215 OFFICE O/P EST HI 40 MIN: CPT

## 2022-07-13 PROCEDURE — 70553 MRI BRAIN STEM W/O & W/DYE: CPT

## 2022-07-13 PROCEDURE — 70553 MRI BRAIN STEM W/O & W/DYE: CPT | Mod: 26

## 2022-07-13 PROCEDURE — A9585: CPT

## 2022-07-13 NOTE — PHYSICAL EXAM
[General Appearance - Alert] : alert [General Appearance - In No Acute Distress] : in no acute distress [General Appearance - Well Nourished] : well nourished [Oriented To Time, Place, And Person] : oriented to person, place, and time [Edema] : there was no peripheral edema [Abnormal Walk] : normal gait [FreeTextEntry1] : Patient seen and examined\par Alert, awake , Oriented X 3. Speech clear and fluent\par PERRLA, EOMI, VFF\par Face symmetrical. Tongue protrudes midline\par WERNER x 4 with good and equal strength\par FFM, coordination equal bilaterally\par Sensation intact bilaterally\par Gait steady. Ambulating independently\par

## 2022-07-13 NOTE — DISCUSSION/SUMMARY
[FreeTextEntry1] : Patient seen and examined\par MRI reviewed from today and compared to 2/2022, 5/2022 - No new enhancement or flair changes to my review. Official report to follow.\par She is clinically doing well. Advised her to eat more and gain a few pounds.\par Will do CBC, CMP today \par Due to start 5th cycle on 7/29/2022- Patient coming back from [facility] on 7/28th \par Will do a clinical follow up in a month\par In the interim,  if any concerns patient knows to call our office.\par

## 2022-07-13 NOTE — HISTORY OF PRESENT ILLNESS
[FreeTextEntry1] : Phylicia Gibson is a 26 yo female whom I am following for a low grade astrocytoma, IDH mutant - she is here for clinical and radiographic follow up.\par \par Initial MRI scan when she was 14 years old, living in [place] and had fallen off of a horse. An MRI at that time showed an incidental mass and she was seen by a neurosurgeon in [place], who recommended expectant follow up and per history, radiographic changes were noted in 2016.\par \par 7/12/2018- Follow up with Dr Gardiner , no referable symptoms. AN MRI was repeated and resection was recommended.\par 10/29/2018- MRI showed a left parietal mass measuring 5.1 x 3.5 x 3.8 cm. 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 Post-operative MRI (personally reviewed) showed a GTR.\par She was then followed expectantly.\par By report, post-surgery she has 10 days of right sided weakness which then improved. She took Keppra initially which she felt made her very depressed. There was no history of seizure and Keppra was stopped.\par She has almost completed her treatment - Temodar is due to stop on the 8th and RT on the 12th. She denies any headache, nausea, seizure, or focal weakness. Weekly CBCs have held up well.\par \par 6/2019 - she had an episode of lightheadedness followed by right LE numbness and then shaking of the right LE.\par SHe was started on Lamictal - she tolerated this but reportedly did not refill.\par She then had 3 more episodes and saw Dr. Hardin in March 2020. MRI at that time showed increased flair changes anterior to the resection cavity. Lamictal was restarted with a plan to up-titrate.\par \par Seizures continued despite medication. MRI, last performed on 9/12/2021 -over time flair abnormality has progressed anteriorly and also through the posterior corpus callosum. There is no enhancement and no hyperperfusion. \par 10/15/2021 - Recommended STUPP protocol\par 11/30- RT started \par 1/12- RT Completed \par 3/7/2022 - MRI stable\par 3/14-3/18- TMZ first cycle \par 4/22-4/26 TMZ cycle 2\par 5/24-5/28 - TMZ 3rd cycle - reduced back to 150 mg / m2\par 6/28-7/2- TMZ 4th cycle \par 7/13/2022 - Patient here for a follow up along with a new MRI. Offers no new complaints. \par She denies any headache, focal weakness, numbness, gait issue or seizure.\par \par

## 2022-09-08 ENCOUNTER — NON-APPOINTMENT (OUTPATIENT)
Age: 27
End: 2022-09-08

## 2022-09-12 ENCOUNTER — APPOINTMENT (OUTPATIENT)
Dept: NEUROLOGY | Facility: CLINIC | Age: 27
End: 2022-09-12

## 2022-09-12 VITALS
WEIGHT: 108 LBS | HEART RATE: 63 BPM | SYSTOLIC BLOOD PRESSURE: 103 MMHG | HEIGHT: 67 IN | OXYGEN SATURATION: 99 % | TEMPERATURE: 98.3 F | BODY MASS INDEX: 16.95 KG/M2 | DIASTOLIC BLOOD PRESSURE: 70 MMHG

## 2022-09-12 PROCEDURE — 99214 OFFICE O/P EST MOD 30 MIN: CPT

## 2022-09-12 NOTE — DISCUSSION/SUMMARY
[FreeTextEntry1] : In summary, this is a 28 yo woman s/p 5 cycles of Temodar for a low grade astrocytoma - resected 10/2020 with growth in 9/2021 and seizures - recommended STUPP protocol. She is clinically stable and is due for MRI prior to 6th cycle of Temodar.\par Blood work obtained Friday - will need to review.\par \par Will try to arrange for MRI brain within 2 weeks.

## 2022-09-12 NOTE — HISTORY OF PRESENT ILLNESS
[FreeTextEntry1] : Phylicia Gibson is a 26 yo female whom I am following for a low grade astrocytoma, IDH mutant , Currently on maintenance cycles of TMZ\par \par Initial MRI scan when she was 14 years old, living in [place] and had fallen off of a horse. An MRI at that time showed an incidental mass and she was seen by a neurosurgeon in [place], who recommended expectant follow up and per history, radiographic changes were noted in 2016.\par \par 7/12/2018- Follow up with Dr Gardiner , no referable symptoms. AN MRI was repeated and resection was recommended.\par 10/29/2018- MRI showed a left parietal mass measuring 5.1 x 3.5 x 3.8 cm. 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 Post-operative MRI (personally reviewed) showed a GTR.\par She was then followed expectantly.\par By report, post-surgery she has 10 days of right sided weakness which then improved. She took Keppra initially which she felt made her very depressed. There was no history of seizure and Keppra was stopped.\par She has almost completed her treatment - Temodar is due to stop on the 8th and RT on the 12th. She denies any headache, nausea, seizure, or focal weakness. Weekly CBCs have held up well.\par \par 6/2019 - she had an episode of lightheadedness followed by right LE numbness and then shaking of the right LE.\par SHe was started on Lamictal - she tolerated this but reportedly did not refill.\par She then had 3 more episodes and saw Dr. Hardin in March 2020. MRI at that time showed increased flair changes anterior to the resection cavity. Lamictal was restarted with a plan to up-titrate.\par \par Seizures continued despite medication. MRI, last performed on 9/12/2021 -over time flair abnormality has progressed anteriorly and also through the posterior corpus callosum. There is no enhancement and no hyperperfusion. \par 11/30- 1/12/2022 - ChemoRT course\par 3/7/2022 - MRI stable\par 3/14-3/18- TMZ first cycle \par 4/22-4/26 TMZ cycle 2\par 5/24-5/28 - TMZ 3rd cycle - reduced back to 150 mg / m2\par 6/28-7/2- TMZ 4th cycle \par 7/13/2022 - MRI stable\par 8/15-8/19/2022 - 5th cycle of TMZ ( Delayed as [place] of [place] and no baseline blood works) \par She has no new complaints - no headache, focal weakness or gait change.

## 2022-09-14 LAB
ALBUMIN SERPL ELPH-MCNC: 4.6 G/DL
ALP BLD-CCNC: 61 U/L
ALT SERPL-CCNC: 7 U/L
ANION GAP SERPL CALC-SCNC: 13 MMOL/L
AST SERPL-CCNC: 12 U/L
BASOPHILS # BLD AUTO: 0.03 K/UL
BASOPHILS NFR BLD AUTO: 0.8 %
BILIRUB SERPL-MCNC: 0.5 MG/DL
BUN SERPL-MCNC: 12 MG/DL
CALCIUM SERPL-MCNC: 9.7 MG/DL
CHLORIDE SERPL-SCNC: 106 MMOL/L
CO2 SERPL-SCNC: 23 MMOL/L
CREAT SERPL-MCNC: 0.71 MG/DL
EGFR: 121 ML/MIN/1.73M2
EOSINOPHIL # BLD AUTO: 0.15 K/UL
EOSINOPHIL NFR BLD AUTO: 3.8 %
GLUCOSE SERPL-MCNC: 71 MG/DL
HCT VFR BLD CALC: 38.4 %
HGB BLD-MCNC: 12.1 G/DL
IMM GRANULOCYTES NFR BLD AUTO: 0.3 %
LYMPHOCYTES # BLD AUTO: 0.61 K/UL
LYMPHOCYTES NFR BLD AUTO: 15.5 %
MAN DIFF?: NORMAL
MCHC RBC-ENTMCNC: 31.5 GM/DL
MCHC RBC-ENTMCNC: 32.2 PG
MCV RBC AUTO: 102.1 FL
MONOCYTES # BLD AUTO: 0.33 K/UL
MONOCYTES NFR BLD AUTO: 8.4 %
NEUTROPHILS # BLD AUTO: 2.81 K/UL
NEUTROPHILS NFR BLD AUTO: 71.2 %
PLATELET # BLD AUTO: 147 K/UL
POTASSIUM SERPL-SCNC: 4.6 MMOL/L
PROT SERPL-MCNC: 7.3 G/DL
RBC # BLD: 3.76 M/UL
RBC # FLD: 12.9 %
SODIUM SERPL-SCNC: 142 MMOL/L
WBC # FLD AUTO: 3.94 K/UL

## 2022-09-19 ENCOUNTER — OUTPATIENT (OUTPATIENT)
Dept: OUTPATIENT SERVICES | Facility: HOSPITAL | Age: 27
LOS: 1 days | End: 2022-09-19
Payer: MEDICAID

## 2022-09-19 ENCOUNTER — RESULT REVIEW (OUTPATIENT)
Age: 27
End: 2022-09-19

## 2022-09-19 ENCOUNTER — APPOINTMENT (OUTPATIENT)
Dept: MRI IMAGING | Facility: HOSPITAL | Age: 27
End: 2022-09-19

## 2022-09-19 DIAGNOSIS — Z98.890 OTHER SPECIFIED POSTPROCEDURAL STATES: Chronic | ICD-10-CM

## 2022-09-19 PROCEDURE — A9585: CPT

## 2022-09-19 PROCEDURE — 70553 MRI BRAIN STEM W/O & W/DYE: CPT

## 2022-09-19 PROCEDURE — 70553 MRI BRAIN STEM W/O & W/DYE: CPT | Mod: 26

## 2022-09-20 ENCOUNTER — APPOINTMENT (OUTPATIENT)
Dept: NEUROLOGY | Facility: CLINIC | Age: 27
End: 2022-09-20

## 2022-09-21 ENCOUNTER — APPOINTMENT (OUTPATIENT)
Dept: NEUROLOGY | Facility: CLINIC | Age: 27
End: 2022-09-21

## 2022-09-21 PROCEDURE — 99214 OFFICE O/P EST MOD 30 MIN: CPT | Mod: 95

## 2022-09-21 NOTE — PHYSICAL EXAM
[FreeTextEntry1] : She is awake and alert - fluent with full comprehension\par Face symmetric\par Moving around apartment well independently

## 2022-09-21 NOTE — DISCUSSION/SUMMARY
[FreeTextEntry1] : In summary, this is a 26 yo woman with a low grade astrocytoma, IDH mutnat - resected initially 10/18.\par Seizures became an issue in June, 2019 and this was associated with increased flair changes anterior to the resection cavity - so treatment was recommended.\par She has completed chemo/Rt (1/2022)\par and 5 cycles of adjuvant Temodar thus far.\par \par She is due for 6th cycle of Temodar - will order.\par She will call when she receives this.\par Weekly CBCs will resume the week after she restarts the Temodar.\par MRI and follow up (in person) @ 11/19.

## 2022-09-21 NOTE — HISTORY OF PRESENT ILLNESS
[FreeTextEntry1] : Phylicia Dyer is a 26 yo female whom I am following for a low grade astrocytoma, IDH mutant , Currently on maintenance cycles of TMZ\par \par Initial MRI scan when she was 14 years old, living in [place] and had fallen off of a horse. An MRI at that time showed an incidental mass and she was seen by a neurosurgeon in [place], who recommended expectant follow up and per history, radiographic changes were noted in 2016.\par \par 7/12/2018- Follow up with Dr Gardiner , no referable symptoms. AN MRI was repeated and resection was recommended.\par 10/29/2018- MRI showed a left parietal mass measuring 5.1 x 3.5 x 3.8 cm. 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 Post-operative MRI (personally reviewed) showed a GTR.\par She was then followed expectantly.\par By report, post-surgery she has 10 days of right sided weakness which then improved. She took Keppra initially which she felt made her very depressed. There was no history of seizure and Keppra was stopped.\par She has almost completed her treatment - Temodar is due to stop on the 8th and RT on the 12th. She denies any headache, nausea, seizure, or focal weakness. Weekly CBCs have held up well.\par \par 6/2019 - she had an episode of lightheadedness followed by right LE numbness and then shaking of the right LE.\par SHe was started on Lamictal - she tolerated this but reportedly did not refill.\par She then had 3 more episodes and saw Dr. Hardin in March 2020. MRI at that time showed increased flair changes anterior to the resection cavity. Lamictal was restarted with a plan to up-titrate.\par \par Seizures continued despite medication. MRI, last performed on 9/12/2021 -over time flair abnormality has progressed anteriorly and also through the posterior corpus callosum. There is no enhancement and no hyperperfusion. \par 11/30- 1/12/2022 - ChemoRT course\par 3/7/2022 - MRI stable\par 3/14-3/18- TMZ first cycle \par 4/22-4/26 TMZ cycle 2\par 5/24-5/28 - TMZ 3rd cycle - reduced back to 150 mg / m2\par 6/28-7/2- TMZ 4th cycle \par 7/13/2022 - MRI stable\par 8/15-8/19/2022 - 5th cycle of TMZ ( Delayed as patient out of country and no baseline blood works) \par 9/20/2022 - Patient seen via tele health with a new MRI.\par \par \par MRI reviewed and is stable - no new enhancement - flair essentially stable and no hyperperfusion

## 2022-09-22 ENCOUNTER — NON-APPOINTMENT (OUTPATIENT)
Age: 27
End: 2022-09-22

## 2022-09-23 LAB
BASOPHILS # BLD AUTO: 0.04 K/UL
BASOPHILS NFR BLD AUTO: 0.9 %
EOSINOPHIL # BLD AUTO: 0.15 K/UL
EOSINOPHIL NFR BLD AUTO: 3.2 %
HCT VFR BLD CALC: 35.7 %
HGB BLD-MCNC: 11.3 G/DL
IMM GRANULOCYTES NFR BLD AUTO: 0.2 %
LYMPHOCYTES # BLD AUTO: 0.77 K/UL
LYMPHOCYTES NFR BLD AUTO: 16.6 %
MAN DIFF?: NORMAL
MCHC RBC-ENTMCNC: 31.7 GM/DL
MCHC RBC-ENTMCNC: 31.8 PG
MCV RBC AUTO: 100.6 FL
MONOCYTES # BLD AUTO: 0.36 K/UL
MONOCYTES NFR BLD AUTO: 7.8 %
NEUTROPHILS # BLD AUTO: 3.31 K/UL
NEUTROPHILS NFR BLD AUTO: 71.3 %
PLATELET # BLD AUTO: 218 K/UL
RBC # BLD: 3.55 M/UL
RBC # FLD: 12.9 %
WBC # FLD AUTO: 4.64 K/UL

## 2022-10-07 ENCOUNTER — NON-APPOINTMENT (OUTPATIENT)
Age: 27
End: 2022-10-07

## 2022-10-14 ENCOUNTER — NON-APPOINTMENT (OUTPATIENT)
Age: 27
End: 2022-10-14

## 2022-10-21 ENCOUNTER — NON-APPOINTMENT (OUTPATIENT)
Age: 27
End: 2022-10-21

## 2022-11-07 ENCOUNTER — APPOINTMENT (OUTPATIENT)
Dept: NEUROLOGY | Facility: CLINIC | Age: 27
End: 2022-11-07

## 2022-11-07 VITALS
BODY MASS INDEX: 16.64 KG/M2 | WEIGHT: 106 LBS | OXYGEN SATURATION: 100 % | DIASTOLIC BLOOD PRESSURE: 80 MMHG | TEMPERATURE: 98 F | SYSTOLIC BLOOD PRESSURE: 117 MMHG | HEART RATE: 74 BPM | HEIGHT: 67 IN

## 2022-11-07 PROCEDURE — 99214 OFFICE O/P EST MOD 30 MIN: CPT

## 2022-11-07 NOTE — PHYSICAL EXAM
[FreeTextEntry1] : She is awake and alert - oriented and fluent with normal expressive and receptive language.\par PERRL, EOMI, VFF\par Face symmetric and tongue midline\par No drift\par FFM are equal bilaterally\par Tone is normal\par Strength is full in UE and LE bilaterally\par VELMA intact\par Ambulating independently.

## 2022-11-07 NOTE — HISTORY OF PRESENT ILLNESS
[FreeTextEntry1] : Phylicia Dyer is a 26 yo female whom I am following for a low grade astrocytoma, IDH mutant , Currently on maintenance cycles of TMZ\par \par Initial MRI scan when she was 14 years old, living in [place] and had fallen off of a horse. An MRI at that time showed an incidental mass and she was seen by a neurosurgeon in [place], who recommended expectant follow up and per history, radiographic changes were noted in 2016.\par \par 7/12/2018- Follow up with Dr Gardiner , no referable symptoms. AN MRI was repeated and resection was recommended.\par 10/29/2018- MRI showed a left parietal mass measuring 5.1 x 3.5 x 3.8 cm. 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 Post-operative MRI (personally reviewed) showed a GTR.\par She was then followed expectantly.\par By report, post-surgery she has 10 days of right sided weakness which then improved. She took Keppra initially which she felt made her very depressed. There was no history of seizure and Keppra was stopped.\par She has almost completed her treatment - Temodar is due to stop on the 8th and RT on the 12th. She denies any headache, nausea, seizure, or focal weakness. Weekly CBCs have held up well.\par \par 6/2019 - she had an episode of lightheadedness followed by right LE numbness and then shaking of the right LE.\par SHe was started on Lamictal - she tolerated this but reportedly did not refill.\par She then had 3 more episodes and saw Dr. Hardin in March 2020. MRI at that time showed increased flair changes anterior to the resection cavity. Lamictal was restarted with a plan to up-titrate.\par \par Seizures continued despite medication. MRI, last performed on 9/12/2021 -over time flair abnormality has progressed anteriorly and also through the posterior corpus callosum. There is no enhancement and no hyperperfusion. \par 11/30- 1/12/2022 - ChemoRT course\par 3/7/2022 - MRI stable\par 3/14-3/18- TMZ first cycle \par 4/22-4/26 TMZ cycle 2\par 5/24-5/28 - TMZ 3rd cycle - reduced back to 150 mg / m2\par 6/28-7/2- TMZ 4th cycle \par 7/13/2022 - MRI stable\par 8/15-8/19/2022 - 5th cycle of TMZ ( Delayed as [place] and no baseline blood works) \par 9/21 - MRI reviewed and is stable - no new enhancement - flair essentially stable and no hyperperfusion \par 9/23-9/27- TMZ 6th cycle\par \par Phylicia is feeling well - she just returned from [place]. She is directing a movie. She has not had any interval seizures - she reports excellent compliance with Keppra.\par She denies any headache, focal weakness, numbness, or coordination issue.\par \par

## 2022-11-07 NOTE — DISCUSSION/SUMMARY
[FreeTextEntry1] : In summary, this is a 26 yo woman for a Left parietal IDH mutant astrocytoma - just completing 6 adjuvant cycles of Temodar. She is feeling well - seizures under excellent control.\par She will be due for an MRI and follow up at the end of this month.\par We will check CBC and CMP as she did not get labs checked while away (Discussed importance of this).\par Plan - MRI and follow up in 2 weeks - if unchanged may change follow up to expectant.

## 2022-11-08 LAB
ALBUMIN SERPL ELPH-MCNC: 4.4 G/DL
ALP BLD-CCNC: 62 U/L
ALT SERPL-CCNC: 8 U/L
ANION GAP SERPL CALC-SCNC: 12 MMOL/L
AST SERPL-CCNC: 14 U/L
BASOPHILS # BLD AUTO: 0.04 K/UL
BASOPHILS NFR BLD AUTO: 0.8 %
BILIRUB SERPL-MCNC: 0.5 MG/DL
BUN SERPL-MCNC: 13 MG/DL
CALCIUM SERPL-MCNC: 9.4 MG/DL
CHLORIDE SERPL-SCNC: 104 MMOL/L
CO2 SERPL-SCNC: 22 MMOL/L
CREAT SERPL-MCNC: 0.77 MG/DL
EGFR: 110 ML/MIN/1.73M2
EOSINOPHIL # BLD AUTO: 0.13 K/UL
EOSINOPHIL NFR BLD AUTO: 2.5 %
GLUCOSE SERPL-MCNC: 83 MG/DL
HCT VFR BLD CALC: 36.5 %
HGB BLD-MCNC: 11.8 G/DL
IMM GRANULOCYTES NFR BLD AUTO: 0.2 %
LYMPHOCYTES # BLD AUTO: 0.63 K/UL
LYMPHOCYTES NFR BLD AUTO: 11.9 %
MAN DIFF?: NORMAL
MCHC RBC-ENTMCNC: 32.2 PG
MCHC RBC-ENTMCNC: 32.3 GM/DL
MCV RBC AUTO: 99.5 FL
MONOCYTES # BLD AUTO: 0.36 K/UL
MONOCYTES NFR BLD AUTO: 6.8 %
NEUTROPHILS # BLD AUTO: 4.13 K/UL
NEUTROPHILS NFR BLD AUTO: 77.8 %
PLATELET # BLD AUTO: 190 K/UL
POTASSIUM SERPL-SCNC: 4.2 MMOL/L
PROT SERPL-MCNC: 7.3 G/DL
RBC # BLD: 3.67 M/UL
RBC # FLD: 13.8 %
SODIUM SERPL-SCNC: 137 MMOL/L
WBC # FLD AUTO: 5.3 K/UL

## 2022-11-14 ENCOUNTER — APPOINTMENT (OUTPATIENT)
Dept: MRI IMAGING | Facility: CLINIC | Age: 27
End: 2022-11-14

## 2022-11-14 ENCOUNTER — RESULT REVIEW (OUTPATIENT)
Age: 27
End: 2022-11-14

## 2022-11-14 ENCOUNTER — OUTPATIENT (OUTPATIENT)
Dept: OUTPATIENT SERVICES | Facility: HOSPITAL | Age: 27
LOS: 1 days | End: 2022-11-14

## 2022-11-14 DIAGNOSIS — Z98.890 OTHER SPECIFIED POSTPROCEDURAL STATES: Chronic | ICD-10-CM

## 2022-11-14 PROCEDURE — 70553 MRI BRAIN STEM W/O & W/DYE: CPT | Mod: 26

## 2022-11-15 ENCOUNTER — APPOINTMENT (OUTPATIENT)
Dept: NEUROLOGY | Facility: CLINIC | Age: 27
End: 2022-11-15

## 2022-11-15 PROCEDURE — 99214 OFFICE O/P EST MOD 30 MIN: CPT | Mod: 95

## 2022-11-15 NOTE — PHYSICAL EXAM
[FreeTextEntry1] : Limited via this medium - she is awake and alert - oriented and fluent\par Face is symmetric \par She is moving all limbs symmetrically and ambulating in her home well.

## 2022-11-15 NOTE — HISTORY OF PRESENT ILLNESS
[FreeTextEntry1] : Patient is seen today in neuro oncologic follow up.\par She is being seen over Telehealth - she is in her home by herself and I am in my office at [address].\par Prior consent has been obtained and 2 ways audio and visual technique utilized.\par \par Phylicia recently completed 6 cycles of TMZ at the end of September for a progressive left parietal astrocytoma, IDH mutant, MGMT unmethylated.  Initial neuroimaging obtained after a fall - abnormality was found incidentally and followed expectantly - Saw Dr. Gardiner 7/2018 who recommended resection.( growth of mass noted between 2011 and 2018)  After resection, followed expectantly - however, patient began to experience seizures and MRIs showed Flair progression. Temodar and RT was recommended.\par \par Chemo RT 11/30 - 1/12/2022.\par 6 cycles of Temodar - last 9/23 - 9/27/2022. Dose was reduced to 150 mg/m2 after 2nd cycle.\par She is clinically doing well - was seen in the office on 11/7 - neurologic examination was intact.\par She reports no seizures and good compliance with Keppra 500 - 1000 mg.

## 2022-11-15 NOTE — DISCUSSION/SUMMARY
[FreeTextEntry1] : I have reviewed her MRI dated 11/14 and have compared it to prior exams dated 9/19 and 7/13. \par No new enhancement noted and no change in flair abnormality. Radiologist report notes no hyperperfusion.\par \par In summary, patient is clinically and radiographically stable.\par She should follow up in 2 months with repeat MRI at that time. She does not need to continue weekly blood work. She knows to continue with Keppra.\par Should she have any issues prior to our visit in 2 months, she will call me.

## 2023-01-20 ENCOUNTER — APPOINTMENT (OUTPATIENT)
Dept: MRI IMAGING | Facility: IMAGING CENTER | Age: 28
End: 2023-01-20

## 2023-02-06 ENCOUNTER — APPOINTMENT (OUTPATIENT)
Dept: NEUROLOGY | Facility: CLINIC | Age: 28
End: 2023-02-06

## 2023-02-06 ENCOUNTER — APPOINTMENT (OUTPATIENT)
Dept: MRI IMAGING | Facility: HOSPITAL | Age: 28
End: 2023-02-06
Payer: MEDICAID

## 2023-02-20 NOTE — PHYSICAL THERAPY INITIAL EVALUATION ADULT - ORIENTATION, REHAB EVAL
"Pt was walking outside, fell on the ice. Pt landed on her R arm. Pt reports pain in her R wrist that aches up her arm. Pt reports her R arm feels \"like it's asleep\", endorses numbness. Pt reports when she opens her eyes she feels dizzy. Pt sitting in triage with her eyes closed and reports it feels better that way. Pt reports she gets motion sickness easily.    Pt with deformity to R wrist.      " oriented to person, place, time and situation

## 2023-03-06 ENCOUNTER — APPOINTMENT (OUTPATIENT)
Dept: MRI IMAGING | Facility: HOSPITAL | Age: 28
End: 2023-03-06

## 2023-03-06 ENCOUNTER — RESULT REVIEW (OUTPATIENT)
Age: 28
End: 2023-03-06

## 2023-03-06 ENCOUNTER — APPOINTMENT (OUTPATIENT)
Dept: NEUROLOGY | Facility: CLINIC | Age: 28
End: 2023-03-06
Payer: MEDICAID

## 2023-03-06 ENCOUNTER — OUTPATIENT (OUTPATIENT)
Dept: OUTPATIENT SERVICES | Facility: HOSPITAL | Age: 28
LOS: 1 days | End: 2023-03-06
Payer: MEDICAID

## 2023-03-06 VITALS
DIASTOLIC BLOOD PRESSURE: 82 MMHG | OXYGEN SATURATION: 100 % | TEMPERATURE: 97.4 F | HEIGHT: 67 IN | BODY MASS INDEX: 16.95 KG/M2 | SYSTOLIC BLOOD PRESSURE: 118 MMHG | HEART RATE: 80 BPM | WEIGHT: 108 LBS

## 2023-03-06 DIAGNOSIS — D49.6 NEOPLASM OF UNSPECIFIED BEHAVIOR OF BRAIN: ICD-10-CM

## 2023-03-06 DIAGNOSIS — G40.109 LOCALIZATION-RELATED (FOCAL) (PARTIAL) SYMPTOMATIC EPILEPSY AND EPILEPTIC SYNDROMES WITH SIMPLE PARTIAL SEIZURES, NOT INTRACTABLE, W/OUT STATUS EPILEPTICUS: ICD-10-CM

## 2023-03-06 DIAGNOSIS — C71.9 MALIGNANT NEOPLASM OF BRAIN, UNSPECIFIED: ICD-10-CM

## 2023-03-06 DIAGNOSIS — Z98.890 OTHER SPECIFIED POSTPROCEDURAL STATES: Chronic | ICD-10-CM

## 2023-03-06 DIAGNOSIS — R56.9 UNSPECIFIED CONVULSIONS: ICD-10-CM

## 2023-03-06 PROCEDURE — 70553 MRI BRAIN STEM W/O & W/DYE: CPT | Mod: 26

## 2023-03-06 PROCEDURE — A9585: CPT

## 2023-03-06 PROCEDURE — 70553 MRI BRAIN STEM W/O & W/DYE: CPT

## 2023-03-06 PROCEDURE — 99215 OFFICE O/P EST HI 40 MIN: CPT

## 2023-03-06 RX ORDER — ONDANSETRON 8 MG/1
8 TABLET ORAL
Qty: 30 | Refills: 2 | Status: DISCONTINUED | COMMUNITY
Start: 2021-10-15 | End: 2023-03-06

## 2023-03-06 RX ORDER — ONDANSETRON 8 MG/1
8 TABLET ORAL
Qty: 30 | Refills: 2 | Status: DISCONTINUED | COMMUNITY
Start: 2022-03-08 | End: 2023-03-06

## 2023-03-06 RX ORDER — TEMOZOLOMIDE 20 MG/1
20 CAPSULE ORAL
Qty: 5 | Refills: 0 | Status: DISCONTINUED | COMMUNITY
Start: 2022-06-21 | End: 2023-03-06

## 2023-03-06 RX ORDER — TEMOZOLOMIDE 100 MG/1
100 CAPSULE ORAL
Qty: 10 | Refills: 0 | Status: DISCONTINUED | COMMUNITY
Start: 2022-06-21 | End: 2023-03-06

## 2023-03-06 RX ORDER — TEMOZOLOMIDE 5 MG/1
5 CAPSULE ORAL
Qty: 15 | Refills: 0 | Status: DISCONTINUED | COMMUNITY
Start: 2022-06-21 | End: 2023-03-06

## 2023-03-06 NOTE — DISCUSSION/SUMMARY
[FreeTextEntry1] : I have reviewed her MRI scan performed today and have compared it to her most recent scan from November. To my review, there is no new enhancement and T2/flair change is stable - plan is to follow up in 2 months with a repeat MRI and visit at that time. Should she have any issues in the interim, she knows to call me.

## 2023-03-06 NOTE — REVIEW OF SYSTEMS
[As Noted in HPI] : as noted in HPI [Arm Weakness] : no arm weakness [Leg Weakness] : no leg weakness [Seizures] : no convulsions [Dizziness] : no dizziness [Lightheadedness] : no lightheadedness [Difficulty Walking] : no difficulty walking [Eyesight Problems] : no eyesight problems

## 2023-03-06 NOTE — HISTORY OF PRESENT ILLNESS
[FreeTextEntry1] : Patient is seen today in neuro oncologic follow up.\par She is being seen over Telehealth - she is in her home by herself and I am in my office at [address].\par Prior consent has been obtained and 2 ways audio and visual technique utilized.\par \par Phylicia recently completed 6 cycles of TMZ at the end of September for a progressive left parietal astrocytoma, IDH mutant, MGMT unmethylated. Initial neuroimaging obtained after a fall - abnormality was found incidentally and followed expectantly - Saw Dr. Gardiner 7/2018 who recommended resection.( growth of mass noted between 2011 and 2018) After resection, followed expectantly - however, patient began to experience seizures and MRIs showed Flair progression. Temodar and RT was recommended.\par \par Chemo RT 11/30 - 1/12/2022.\par 6 cycles of Temodar - last 9/23 - 9/27/2022. Dose was reduced to 150 mg/m2 after 2nd cycle.\par 11/14/2022- MRI stable\par 3/6/2023- Here for a follow, complaints of auditory perceptual issues. Tolerating Keppra w/o any side effects. No new seizures. No visual issues.\par

## 2023-03-06 NOTE — PHYSICAL EXAM
[FreeTextEntry1] : PHYSICAL EXAMINATION:\par General: Well-developed, well nourished, in no acute distress.\par Eyes: Conjunctiva and sclera clear.\par Neurologic:\par - Mental Status:  Alert, awake, oriented to person, place, and time; Speech is fluent with intact naming, repetition, and comprehension; Good overall fund of knowledge;\par - Cranial Nerves II-XII:  \par II:  Visual fields are full to confrontation; Pupils are equal, round, and reactive to light;\par III, IV, VI:  Extraocular movements are intact without nystagmus.\par V:  Facial sensation is intact in the V1-V3 distribution bilaterally.\par VII:  Face is symmetric with normal eye closure and smile\par VIII:  Hearing is intact to finger rub.\par IX, X:  Uvula is midline and soft palate rises symmetrically\par XI:  Head turning and shoulder shrug are intact.\par XII:  Tongue protrudes in the midline.\par - Motor:  Strength is 5/5 throughout.  There is no prontator drift.  Normal muscle bulk and tone throughout.\par - Reflexes:  2+ and symmetric at the biceps, triceps, brachioradialis, knees, and ankles.  \par - Sensory:  Intact throughout to LT, slight decrease vibration and pin prick on right side \par - Coordination:  Finger-nose-finger  intact without dysmetria.\par - Gait:   Normal steps, base, arm swing, and turning.\par   \par Cardio: Pulse present, no peripheral edema\par Pulm: No resp distress, non labored breathing\par GI: Abd soft non tender, non distended\par Extremities: No edema\par \par

## 2023-03-07 PROBLEM — C71.9 ASTROCYTOMA: Status: ACTIVE | Noted: 2023-03-07

## 2023-05-01 ENCOUNTER — OUTPATIENT (OUTPATIENT)
Dept: OUTPATIENT SERVICES | Facility: HOSPITAL | Age: 28
LOS: 1 days | End: 2023-05-01
Payer: MEDICAID

## 2023-05-01 ENCOUNTER — APPOINTMENT (OUTPATIENT)
Dept: NEUROLOGY | Facility: CLINIC | Age: 28
End: 2023-05-01
Payer: MEDICAID

## 2023-05-01 ENCOUNTER — APPOINTMENT (OUTPATIENT)
Dept: MRI IMAGING | Facility: HOSPITAL | Age: 28
End: 2023-05-01
Payer: MEDICAID

## 2023-05-01 VITALS
HEART RATE: 68 BPM | BODY MASS INDEX: 16.64 KG/M2 | DIASTOLIC BLOOD PRESSURE: 66 MMHG | TEMPERATURE: 98.6 F | SYSTOLIC BLOOD PRESSURE: 102 MMHG | OXYGEN SATURATION: 99 % | HEIGHT: 67 IN | WEIGHT: 106 LBS

## 2023-05-01 DIAGNOSIS — Z98.890 OTHER SPECIFIED POSTPROCEDURAL STATES: Chronic | ICD-10-CM

## 2023-05-01 PROCEDURE — A9585: CPT

## 2023-05-01 PROCEDURE — 70553 MRI BRAIN STEM W/O & W/DYE: CPT

## 2023-05-01 PROCEDURE — 70553 MRI BRAIN STEM W/O & W/DYE: CPT | Mod: 26

## 2023-05-01 PROCEDURE — 99215 OFFICE O/P EST HI 40 MIN: CPT

## 2023-05-01 NOTE — PHYSICAL EXAM
[FreeTextEntry1] : She is awake and alert - oriented and fluent\par EOMI, VFF\par Face symmetric and tongue midline\par No drift\par FFM are equal bilaterally\par Strength is full in all 4 limbs.

## 2023-05-01 NOTE — HISTORY OF PRESENT ILLNESS
[FreeTextEntry1] : Phylicia Dyer is a 28 yo female whom I am following for a low grade astrocytoma, IDH mutant\par \par Initial MRI scan when she was 14 years old, living in [place] and had fallen off of a horse. An MRI at that time showed an incidental mass and she was seen by a neurosurgeon in [place], who recommended expectant follow up and per history, radiographic changes were noted in 2016.\par \par 7/12/2018- Follow up with Dr Gardiner , no referable symptoms. AN MRI was repeated and resection was recommended.\par 10/29/2018- MRI showed a left parietal mass measuring 5.1 x 3.5 x 3.8 cm. 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 Post-operative MRI (personally reviewed) showed a GTR.\par She was then followed expectantly.\par By report, post-surgery she has 10 days of right sided weakness which then improved. She took Keppra initially which she felt made her very depressed. There was no history of seizure and Keppra was stopped.\par She has almost completed her treatment - Temodar is due to stop on the 8th and RT on the 12th. She denies any headache, nausea, seizure, or focal weakness. Weekly CBCs have held up well.\par \par 6/2019 - she had an episode of lightheadedness followed by right LE numbness and then shaking of the right LE.\par SHe was started on Lamictal - she tolerated this but reportedly did not refill.\par She then had 3 more episodes and saw Dr. Hardin in March 2020. MRI at that time showed increased flair changes anterior to the resection cavity. Lamictal was restarted with a plan to up-titrate.\par \par Seizures continued despite medication. MRI, last performed on 9/12/2021 -over time flair abnormality has progressed anteriorly and also through the posterior corpus callosum. There is no enhancement and no hyperperfusion. \par 11/30- 1/12/2022 - ChemoRT course\par 3/7/2022 - MRI stable\par 3/14-3/18- TMZ first cycle \par 4/22-4/26 TMZ cycle 2\par 5/24-5/28 - TMZ 3rd cycle - reduced back to 150 mg / m2\par 6/28-7/2- TMZ 4th cycle \par 7/13/2022 - MRI stable\par 8/15-8/19/2022 - 5th cycle of TMZ ( Del[place] as [place] and no baseline blood works) \par 9/21 - MRI reviewed and is stable - no new enhancement - flair essentially stable and no hyperperfusion \par 9/23-9/27- TMZ 6th cycle\par 11/14/2022- MRI stable\par 3/1/2023- MRI stable \par 5/1/2023- Here for a follow up along with a new MRI  She has no new complaints - remains seizure free - denies headaches, focal weakness, numbness, coordination, or gait issues.\par \par \par  \par

## 2023-05-01 NOTE — DISCUSSION/SUMMARY
[FreeTextEntry1] : In summary, this is a 27 yo who completed chemotherapy for her progressive left parietal astrocytoma in September. She is neurologically stable and I have reviewed her MRI from today and have compared it to prior exams dated 3/6/2023 and 11/14/2022 and see an empty left parietal operative cavity with no new enhancement and stable surrounding flair abnormality.\par \jose Phylicia should follow up with me in 2 months with a repeat MRI at that time.\par Should she have any issues in the interim, she and her mother know to call me.

## 2023-05-30 RX ORDER — LEVETIRACETAM 500 MG/1
500 TABLET, FILM COATED ORAL
Qty: 90 | Refills: 5 | Status: ACTIVE | COMMUNITY
Start: 2021-01-19 | End: 1900-01-01

## 2023-07-06 ENCOUNTER — EMERGENCY (EMERGENCY)
Facility: HOSPITAL | Age: 28
LOS: 1 days | Discharge: HOME | End: 2023-07-06
Attending: EMERGENCY MEDICINE | Admitting: EMERGENCY MEDICINE
Payer: MEDICAID

## 2023-07-06 VITALS
TEMPERATURE: 98 F | OXYGEN SATURATION: 99 % | SYSTOLIC BLOOD PRESSURE: 121 MMHG | HEART RATE: 62 BPM | RESPIRATION RATE: 16 BRPM | WEIGHT: 110.01 LBS | HEIGHT: 67 IN | DIASTOLIC BLOOD PRESSURE: 76 MMHG

## 2023-07-06 DIAGNOSIS — R11.2 NAUSEA WITH VOMITING, UNSPECIFIED: ICD-10-CM

## 2023-07-06 DIAGNOSIS — G45.9 TRANSIENT CEREBRAL ISCHEMIC ATTACK, UNSPECIFIED: ICD-10-CM

## 2023-07-06 DIAGNOSIS — R00.1 BRADYCARDIA, UNSPECIFIED: ICD-10-CM

## 2023-07-06 DIAGNOSIS — Z98.890 OTHER SPECIFIED POSTPROCEDURAL STATES: Chronic | ICD-10-CM

## 2023-07-06 DIAGNOSIS — G44.209 TENSION-TYPE HEADACHE, UNSPECIFIED, NOT INTRACTABLE: ICD-10-CM

## 2023-07-06 DIAGNOSIS — R51.9 HEADACHE, UNSPECIFIED: ICD-10-CM

## 2023-07-06 DIAGNOSIS — Z88.2 ALLERGY STATUS TO SULFONAMIDES: ICD-10-CM

## 2023-07-06 DIAGNOSIS — N39.0 URINARY TRACT INFECTION, SITE NOT SPECIFIED: ICD-10-CM

## 2023-07-06 DIAGNOSIS — I49.8 OTHER SPECIFIED CARDIAC ARRHYTHMIAS: ICD-10-CM

## 2023-07-06 DIAGNOSIS — Z88.0 ALLERGY STATUS TO PENICILLIN: ICD-10-CM

## 2023-07-06 LAB
ALBUMIN SERPL ELPH-MCNC: 4.5 G/DL — SIGNIFICANT CHANGE UP (ref 3.4–5)
ALP SERPL-CCNC: 71 U/L — SIGNIFICANT CHANGE UP (ref 40–120)
ALT FLD-CCNC: 18 U/L — SIGNIFICANT CHANGE UP (ref 12–42)
ANION GAP SERPL CALC-SCNC: 11 MMOL/L — SIGNIFICANT CHANGE UP (ref 9–16)
APTT BLD: 32.7 SEC — SIGNIFICANT CHANGE UP (ref 27.5–35.5)
AST SERPL-CCNC: 15 U/L — SIGNIFICANT CHANGE UP (ref 15–37)
BASOPHILS # BLD AUTO: 0.06 K/UL — SIGNIFICANT CHANGE UP (ref 0–0.2)
BASOPHILS NFR BLD AUTO: 0.9 % — SIGNIFICANT CHANGE UP (ref 0–2)
BILIRUB SERPL-MCNC: 0.3 MG/DL — SIGNIFICANT CHANGE UP (ref 0.2–1.2)
BUN SERPL-MCNC: 19 MG/DL — SIGNIFICANT CHANGE UP (ref 7–23)
CALCIUM SERPL-MCNC: 9.8 MG/DL — SIGNIFICANT CHANGE UP (ref 8.5–10.5)
CHLORIDE SERPL-SCNC: 100 MMOL/L — SIGNIFICANT CHANGE UP (ref 96–108)
CO2 SERPL-SCNC: 23 MMOL/L — SIGNIFICANT CHANGE UP (ref 22–31)
CREAT SERPL-MCNC: 0.75 MG/DL — SIGNIFICANT CHANGE UP (ref 0.5–1.3)
EGFR: 113 ML/MIN/1.73M2 — SIGNIFICANT CHANGE UP
EOSINOPHIL # BLD AUTO: 0.19 K/UL — SIGNIFICANT CHANGE UP (ref 0–0.5)
EOSINOPHIL NFR BLD AUTO: 2.8 % — SIGNIFICANT CHANGE UP (ref 0–6)
GLUCOSE SERPL-MCNC: 100 MG/DL — HIGH (ref 70–99)
HCG SERPL-ACNC: <1 MIU/ML — SIGNIFICANT CHANGE UP
HCT VFR BLD CALC: 36.1 % — SIGNIFICANT CHANGE UP (ref 34.5–45)
HGB BLD-MCNC: 11.7 G/DL — SIGNIFICANT CHANGE UP (ref 11.5–15.5)
IMM GRANULOCYTES NFR BLD AUTO: 0.3 % — SIGNIFICANT CHANGE UP (ref 0–0.9)
INR BLD: 1.16 — SIGNIFICANT CHANGE UP (ref 0.88–1.16)
LYMPHOCYTES # BLD AUTO: 1.7 K/UL — SIGNIFICANT CHANGE UP (ref 1–3.3)
LYMPHOCYTES # BLD AUTO: 24.9 % — SIGNIFICANT CHANGE UP (ref 13–44)
MCHC RBC-ENTMCNC: 29.1 PG — SIGNIFICANT CHANGE UP (ref 27–34)
MCHC RBC-ENTMCNC: 32.4 GM/DL — SIGNIFICANT CHANGE UP (ref 32–36)
MCV RBC AUTO: 89.8 FL — SIGNIFICANT CHANGE UP (ref 80–100)
MONOCYTES # BLD AUTO: 0.47 K/UL — SIGNIFICANT CHANGE UP (ref 0–0.9)
MONOCYTES NFR BLD AUTO: 6.9 % — SIGNIFICANT CHANGE UP (ref 2–14)
NEUTROPHILS # BLD AUTO: 4.39 K/UL — SIGNIFICANT CHANGE UP (ref 1.8–7.4)
NEUTROPHILS NFR BLD AUTO: 64.2 % — SIGNIFICANT CHANGE UP (ref 43–77)
NRBC # BLD: 0 /100 WBCS — SIGNIFICANT CHANGE UP (ref 0–0)
PLATELET # BLD AUTO: 201 K/UL — SIGNIFICANT CHANGE UP (ref 150–400)
POTASSIUM SERPL-MCNC: 3.5 MMOL/L — SIGNIFICANT CHANGE UP (ref 3.5–5.3)
POTASSIUM SERPL-SCNC: 3.5 MMOL/L — SIGNIFICANT CHANGE UP (ref 3.5–5.3)
PROT SERPL-MCNC: 8.4 G/DL — HIGH (ref 6.4–8.2)
PROTHROM AB SERPL-ACNC: 13.6 SEC — HIGH (ref 10.5–13.4)
RBC # BLD: 4.02 M/UL — SIGNIFICANT CHANGE UP (ref 3.8–5.2)
RBC # FLD: 14.8 % — HIGH (ref 10.3–14.5)
SODIUM SERPL-SCNC: 134 MMOL/L — SIGNIFICANT CHANGE UP (ref 132–145)
TROPONIN I, HIGH SENSITIVITY RESULT: <4 NG/L — SIGNIFICANT CHANGE UP
WBC # BLD: 6.83 K/UL — SIGNIFICANT CHANGE UP (ref 3.8–10.5)
WBC # FLD AUTO: 6.83 K/UL — SIGNIFICANT CHANGE UP (ref 3.8–10.5)

## 2023-07-06 PROCEDURE — 70498 CT ANGIOGRAPHY NECK: CPT | Mod: 26

## 2023-07-06 PROCEDURE — 0042T: CPT

## 2023-07-06 PROCEDURE — 99291 CRITICAL CARE FIRST HOUR: CPT

## 2023-07-06 PROCEDURE — 70496 CT ANGIOGRAPHY HEAD: CPT | Mod: 26

## 2023-07-06 RX ORDER — SODIUM CHLORIDE 9 MG/ML
1000 INJECTION INTRAMUSCULAR; INTRAVENOUS; SUBCUTANEOUS ONCE
Refills: 0 | Status: COMPLETED | OUTPATIENT
Start: 2023-07-06 | End: 2023-07-06

## 2023-07-06 RX ORDER — ONDANSETRON 8 MG/1
4 TABLET, FILM COATED ORAL ONCE
Refills: 0 | Status: COMPLETED | OUTPATIENT
Start: 2023-07-06 | End: 2023-07-06

## 2023-07-06 RX ORDER — ACETAMINOPHEN 500 MG
650 TABLET ORAL ONCE
Refills: 0 | Status: COMPLETED | OUTPATIENT
Start: 2023-07-06 | End: 2023-07-06

## 2023-07-06 RX ADMIN — Medication 650 MILLIGRAM(S): at 23:14

## 2023-07-06 RX ADMIN — SODIUM CHLORIDE 1000 MILLILITER(S): 9 INJECTION INTRAMUSCULAR; INTRAVENOUS; SUBCUTANEOUS at 23:15

## 2023-07-06 RX ADMIN — ONDANSETRON 4 MILLIGRAM(S): 8 TABLET, FILM COATED ORAL at 23:14

## 2023-07-06 NOTE — ED PROVIDER NOTE - CRITICAL CARE ATTENDING CONTRIBUTION TO CARE
The patient was seen immediately upon arrival due to a high probability of imminent or life-threatening deterioration secondary to possible stroke, which required my direct attention, intervention, and personal management at the bedside. I have personally provided critical care time exclusive of time spent on separately billable procedures. Time includes review of laboratory data, radiology results, discussion with consultants, discussion with the patient's family, and monitoring for potential decompensation.

## 2023-07-06 NOTE — ED ADULT NURSE NOTE - OBJECTIVE STATEMENT
Pt is a 28y female c/o generalized tingling, nausea with multiple vomiting episodes, severe HA, and dizziness at 21:45. Pt states dizziness has since subsided. Denies blurry vision. AAOx4. PMH brain tumor, seizures, and anemia. Pt states from brain tumor surgery, pt has residual RUE and RLE decreased sensation that pt states is baseline. Code stroke called at 22:27, CT completed at 22:50.

## 2023-07-06 NOTE — ED ADULT TRIAGE NOTE - CHIEF COMPLAINT QUOTE
Pt walk in c/o sudden onset of dizziness, severe headache, generalized blurred vision and RLE numbness x approximately 1 hour ago. Pt states dizziness and headache has improved, states blurred vision and RLE numbness has subsided. Pt endorses hx of brain tumor, states she had surgery, chemo and radiation, reports last CT scan was x2 months ago and it was "clean". Pt aox4, speech clear, Pt ambulatory w/ steady gait. Dr [place] to triage for evaluation.

## 2023-07-06 NOTE — ED PROVIDER NOTE - OBJECTIVE STATEMENT
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  Symptoms began approximately 1-2 hours prior to.  Patient has had similar episodes in the past but is concerned due to her history.  Reports eating shrimp earlier in the day.  GI symptoms like diarrhea.  Was working as a  when symptoms began currently she states measures mildly improved.  Last EGD was approximately 2 months ago changes noted.  Patient states that when her right lower extremity went numb she thought she was maybe going to have a seizure typically.

## 2023-07-06 NOTE — ED PROVIDER NOTE - NOTES
Recommend TIA observation for MRI, but no indication for DAP at this time. Recommend TIA observation for MRI, but no indication for DAP at this time. Despite NIH being 1, they are comfortable with this since this is the patient's baseline and unchanged recently (she has chronic decreased sensation to the R side of her body).

## 2023-07-06 NOTE — ED PROVIDER NOTE - CLINICAL SUMMARY MEDICAL DECISION MAKING FREE TEXT BOX
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  Symptoms began approximately 1-2 hours prior to.  Patient has had similar episodes in the past but is concerned due to her history.  Reports eating shrimp earlier in the day.  GI symptoms like diarrhea.  Was working as a  when symptoms began currently she states measures mildly improved.  Last EGD was approximately 2 months ago changes noted.  Patient states that when her right lower extremity went numb she thought she was maybe going to have a seizure typically.    Patient A&Ox3, well-appearing, and in no acute distress. Head NCAT. EOM intact and PERRL. Oropharynx with MMM. Heart rate regular, with no murmurs/gallops/clicks/rubs. Breath sounds clear to auscultation bilaterally. Abdomen NTND, soft, with normal bowel sounds. Skin warm and dry. 5/5 strength in all 4 extremities with sensation intact to light touch. CN II-XII grossly intact. NIH 0.    MDM: Patient presents with concerning features for possible stroke, therefore code stroke was called upon arrival.  Stroke work-up started.  This could also be something more benign such as foodborne illness, migraine, dehydration or viral illness. 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  Symptoms began approximately 1-2 hours prior to.  Patient has had similar episodes in the past but is concerned due to her history.  Reports eating shrimp earlier in the day.  GI symptoms like diarrhea.  Was working as a  when symptoms began currently she states measures mildly improved.  Last EGD was approximately 2 months ago changes noted.  Patient states that when her right lower extremity went numb she thought she was maybe going to have a seizure typically.    Patient A&Ox3, well-appearing, and in no acute distress. Head NCAT. EOM intact and PERRL. Oropharynx with MMM. Heart rate regular, with no murmurs/gallops/clicks/rubs. Breath sounds clear to auscultation bilaterally. Abdomen NTND, soft, with normal bowel sounds. Skin warm and dry. 5/5 strength in all 4 extremities with sensation intact to light touch. CN II-XII grossly intact. NIH 1 (patient's baseline-chronic R sided decreased sensation).    MDM: Patient presents with concerning features for possible stroke, therefore code stroke was called upon arrival.  Stroke work-up started.  This could also be something more benign such as foodborne illness, migraine, dehydration or viral illness.

## 2023-07-06 NOTE — ED PROVIDER NOTE - PHYSICAL EXAMINATION
Patient A&Ox3, well-appearing, and in no acute distress. Head NCAT. EOM intact and PERRL. Oropharynx with MMM. Heart rate regular, with no murmurs/gallops/clicks/rubs. Breath sounds clear to auscultation bilaterally. Abdomen NTND, soft, with normal bowel sounds. Skin warm and dry. 5/5 strength in all 4 extremities with sensation intact to light touch. CN II-XII grossly intact. NIH 0. Patient A&Ox3, well-appearing, and in no acute distress. Head NCAT. EOM intact and PERRL. Oropharynx with MMM. Heart rate regular, with no murmurs/gallops/clicks/rubs. Breath sounds clear to auscultation bilaterally. Abdomen NTND, soft, with normal bowel sounds. Skin warm and dry. 5/5 strength in all 4 extremities with sensation intact to light touch. CN II-XII grossly intact. NIH 1 (baseline from prior brain tumor).

## 2023-07-06 NOTE — ED PROVIDER NOTE - CARE PLAN
1 Principal Discharge DX:	Dizziness  Secondary Diagnosis:	Nausea & vomiting  Secondary Diagnosis:	Tension headache

## 2023-07-07 VITALS
HEART RATE: 57 BPM | RESPIRATION RATE: 16 BRPM | OXYGEN SATURATION: 100 % | DIASTOLIC BLOOD PRESSURE: 73 MMHG | SYSTOLIC BLOOD PRESSURE: 110 MMHG

## 2023-07-07 LAB
APPEARANCE UR: CLEAR — SIGNIFICANT CHANGE UP
BACTERIA # UR AUTO: ABNORMAL /HPF
BILIRUB UR-MCNC: NEGATIVE — SIGNIFICANT CHANGE UP
CHOLEST SERPL-MCNC: 118 MG/DL — SIGNIFICANT CHANGE UP
COLOR SPEC: YELLOW — SIGNIFICANT CHANGE UP
COMMENT - URINE: SIGNIFICANT CHANGE UP
DIFF PNL FLD: NEGATIVE — SIGNIFICANT CHANGE UP
EPI CELLS # UR: 12 — SIGNIFICANT CHANGE UP
GLUCOSE UR QL: NEGATIVE MG/DL — SIGNIFICANT CHANGE UP
GRAN CASTS # UR COMP ASSIST: 1 — SIGNIFICANT CHANGE UP
HDLC SERPL-MCNC: 64 MG/DL — SIGNIFICANT CHANGE UP
KETONES UR-MCNC: NEGATIVE MG/DL — SIGNIFICANT CHANGE UP
LEUKOCYTE ESTERASE UR-ACNC: ABNORMAL
LIPID PNL WITH DIRECT LDL SERPL: 42 MG/DL — SIGNIFICANT CHANGE UP
NITRITE UR-MCNC: NEGATIVE — SIGNIFICANT CHANGE UP
NON HDL CHOLESTEROL: 54 MG/DL — SIGNIFICANT CHANGE UP
PH UR: 6.5 — SIGNIFICANT CHANGE UP (ref 5–8)
PROT UR-MCNC: NEGATIVE MG/DL — SIGNIFICANT CHANGE UP
RBC CASTS # UR COMP ASSIST: 1 /HPF — SIGNIFICANT CHANGE UP (ref 0–4)
SP GR SPEC: 1.04 — HIGH (ref 1–1.03)
TRIGL SERPL-MCNC: 58 MG/DL — SIGNIFICANT CHANGE UP
UROBILINOGEN FLD QL: 0.2 MG/DL — SIGNIFICANT CHANGE UP (ref 0.2–1)
WBC UR QL: 3 /HPF — SIGNIFICANT CHANGE UP (ref 0–5)

## 2023-07-07 PROCEDURE — 70551 MRI BRAIN STEM W/O DYE: CPT | Mod: 26

## 2023-07-07 PROCEDURE — 99238 HOSP IP/OBS DSCHRG MGMT 30/<: CPT

## 2023-07-07 RX ORDER — BACITRACIN ZINC 500 UNIT/G
1 OINTMENT IN PACKET (EA) TOPICAL ONCE
Refills: 0 | Status: COMPLETED | OUTPATIENT
Start: 2023-07-07 | End: 2023-07-07

## 2023-07-07 RX ORDER — CIPROFLOXACIN LACTATE 400MG/40ML
1 VIAL (ML) INTRAVENOUS
Qty: 14 | Refills: 0
Start: 2023-07-07 | End: 2023-07-13

## 2023-07-07 RX ADMIN — SODIUM CHLORIDE 1000 MILLILITER(S): 9 INJECTION INTRAMUSCULAR; INTRAVENOUS; SUBCUTANEOUS at 00:00

## 2023-07-07 RX ADMIN — Medication 1 APPLICATION(S): at 03:41

## 2023-07-07 NOTE — ED CDU PROVIDER INITIAL DAY NOTE - PHYSICAL EXAMINATION
Patient A&Ox3, well-appearing, and in no acute distress. Head NCAT. EOM intact and PERRL. Oropharynx with MMM. Heart rate regular, with no murmurs/gallops/clicks/rubs. Breath sounds clear to auscultation bilaterally. Abdomen NTND, soft, with normal bowel sounds. Skin warm and dry. 5/5 strength in all 4 extremities with sensation intact to light touch. CN II-XII grossly intact. NIH 1 (baseline from prior brain tumor).

## 2023-07-07 NOTE — ED CDU PROVIDER INITIAL DAY NOTE - NOTES
Recommend TIA observation for MRI, but no indication for DAP at this time. Despite NIH being 1, they are comfortable with this since this is the patient's baseline and unchanged recently (she has chronic decreased sensation to the R side of her body).

## 2023-07-07 NOTE — ED CDU PROVIDER DISPOSITION NOTE - PATIENT PORTAL LINK FT
You can access the FollowMyHealth Patient Portal offered by [facility] by registering at the following website: [email or web address]. By joining [facility]’s FollowMyHealth portal, you will also be able to view your health information using other applications (apps) compatible with our system.

## 2023-07-07 NOTE — ED CDU PROVIDER INITIAL DAY NOTE - CLINICAL SUMMARY MEDICAL DECISION MAKING FREE TEXT BOX
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  Symptoms began approximately 1-2 hours prior to.  Patient has had similar episodes in the past but is concerned due to her history.  Reports eating shrimp earlier in the day.  GI symptoms like diarrhea.  Was working as a  when symptoms began currently she states measures mildly improved.  Last EGD was approximately 2 months ago changes noted.  Patient states that when her right lower extremity went numb she thought she was maybe going to have a seizure typically.    Patient A&Ox3, well-appearing, and in no acute distress. Head NCAT. EOM intact and PERRL. Oropharynx with MMM. Heart rate regular, with no murmurs/gallops/clicks/rubs. Breath sounds clear to auscultation bilaterally. Abdomen NTND, soft, with normal bowel sounds. Skin warm and dry. 5/5 strength in all 4 extremities with sensation intact to light touch. CN II-XII grossly intact. NIH 1 (patient's baseline-chronic R sided decreased sensation).    MDM: Patient presents with concerning features for possible stroke, therefore code stroke was called upon arrival.  Stroke work-up started.  This could also be something more benign such as foodborne illness, migraine, dehydration or viral illness.

## 2023-07-07 NOTE — ED ADULT NURSE REASSESSMENT NOTE - NS ED NURSE REASSESS COMMENT FT1
Patient educated on BE FAST using stroke education packet, patient able to teach back BE FAST to RN. Patient also states understanding of calling 911 when having positive BE FAST symptoms.  Patient educated on patient specific risk factors of stroke. Patient states understanding, and received patient specific risk factors via hand out.

## 2023-07-07 NOTE — ED CDU PROVIDER DISPOSITION NOTE - NSFOLLOWUPINSTRUCTIONS_ED_ALL_ED_FT
Transient Ischemic Attack  A transient ischemic attack (TIA) causes stroke-like symptoms that go away quickly. Having a TIA means that a person is at higher risk for a stroke. A TIA happens when blood supply to the brain is blocked temporarily. A TIA is a medical emergency.    What are the causes?  This condition is caused by a temporary blockage in an artery in the head or neck. This means the brain does not get the blood supply it needs. There is no permanent brain damage with a TIA. A blockage can be caused by:  Fatty buildup in an artery in the head or neck (atherosclerosis).  A blood clot.  An artery tear (dissection).  Inflammation of an artery (vasculitis).  Sometimes the cause is not known.    What increases the risk?  Certain factors may make you more likely to develop this condition. Some of these are things that you can change, such as:  Obesity.  Using products that contain nicotine or tobacco.  Taking oral birth control, especially if you also use tobacco.  Not being active.  Heavy alcohol use.  Drug use, especially cocaine and methamphetamine.  Medical conditions that may increase your risk include:  High blood pressure (hypertension).  High cholesterol.  Diabetes.  Heart disease (coronary artery disease).  An irregular heartbeat, also called atrial fibrillation (AFib).  Sickle cell disease.  Sleep problems (sleep apnea).  Chronic inflammatory diseases, such as rheumatoid arthritis or lupus.  Blood clotting disorders (hypercoagulable state).  Other risk factors include:  Being over the age of 60.  Being male.  Family history of stroke.  Previous history of blood clots, stroke, TIA, or heart attack.  Having a history of preeclampsia.  Migraine headache.  What are the signs or symptoms?    Symptoms of a TIA are the same as those of a stroke. The symptoms develop suddenly, and then go away quickly. They may include:  Weakness or numbness in your face, arm, or leg, especially on one side of your body.  Trouble walking or moving your arms or legs.  Trouble speaking, understanding speech, or both (aphasia).  Vision changes, such as double vision, blurred vision, or loss of vision.  Dizziness.  Confusion.  Loss of balance or coordination.  Nausea and vomiting.  Severe headache.  If possible, note what time your symptoms started. Tell your health care provider.    How is this diagnosed?  This condition may be diagnosed based on:  Your symptoms and medical history.  A physical exam.  Imaging tests, usually a CT scan or MRI of the brain.  Blood tests.  You may also have other tests, including:  Electrocardiogram (ECG).  Echocardiogram.  Carotid ultrasound.  A scan of blood circulation in the brain (CT angiogram or MR angiogram).  Continuous heart monitoring.  How is this treated?  The goal of treatment is to reduce the risk for a stroke. Stroke prevention therapies may include:  Changes to diet and lifestyle, such as being physically active and stopping smoking.  Medicines to thin the blood (antiplatelets or anticoagulants).  Blood pressure medicines.  Medicines to reduce cholesterol.  Treating other health conditions, such as diabetes or AFib.  If testing shows a narrowing in the arteries to your brain, your health care provider may recommend a procedure, such as:  Carotid endarterectomy. This is done to remove the blockage from your artery.  Carotid angioplasty and stenting. This uses a tube (stent) to open or widen an artery in the neck. The stent helps keep the artery open by supporting the artery walls.  Follow these instructions at home:  Medicines    Take over-the-counter and prescription medicines only as told by your health care provider.  If you were told to take a medicine to thin your blood, such as aspirin or an anticoagulant, take it exactly as told by your health care provider.  Taking too much blood-thinning medicine can cause bleeding. Taking too little will not protect you against a stroke and other problems.  Eating and drinking      Eat 5 or more servings of fruits and vegetables each day.  Follow guidelines from your health care provider about your diet. You may need to follow a certain diet to help manage risk factors for stroke. This may include:  Eating a low-fat, low-salt diet.  Choosing high-fiber foods.  Limiting carbohydrates and sugar.  If you drink alcohol:  Limit how much you have to:  0–1 drink a day for women who are not pregnant.  0–2 drinks a day for men.  Know how much alcohol is in a drink. In the [place] one drink equals one 12 oz bottle of beer (355mL), one 5 oz glass of wine (148mL), or one 1½ oz glass of hard liquor (44mL).  General instructions    Maintain a healthy weight.  Try to get at least 30 minutes of exercise on most days.  Get treatment if you have sleep apnea.  Do not use any products that contain nicotine or tobacco. These products include cigarettes, chewing tobacco, and vaping devices, such as e-cigarettes. If you need help quitting, ask your health care provider.  Do not use drugs.  Keep all follow-up visits. This is important.  Where to find more information  American Stroke Association: www.stroke.org  Get help right away if:  You have chest pain or an irregular heartbeat.  You have any symptoms of a stroke. "BE FAST" is an easy way to remember the main warning signs of a stroke.  B - Balance. Signs are dizziness, sudden trouble walking, or loss of balance.  E - Eyes. Signs are trouble seeing or a sudden change in vision.  F - Face. Signs are sudden weakness or numbness of the face, or the face or eyelid drooping on one side.  A - Arms. Signs are weakness or numbness in an arm. This happens suddenly and usually on one side of the body.  S - Speech. Signs are sudden trouble speaking, slurred speech, or trouble understanding what people say.  T - Time. Time to call emergency services. Write down what time symptoms started.  You have other signs of a stroke, such as:  A sudden, severe headache with no known cause.  Nausea or vomiting.  Seizure.  These symptoms may represent a serious problem that is an emergency. Do not wait to see if the symptoms will go away. Get medical help right away. Call your local emergency services (911 in the [place] Do not drive yourself to the hospital.    Summary  A transient ischemic attack (TIA) happens when an artery in the head or neck is blocked. The blockage clears before there is any permanent brain damage. A TIA is a medical emergency.  Symptoms of a TIA are the same as those of a stroke. The symptoms develop suddenly, and then go away quickly.  Having a TIA means that you are at higher risk for a stroke.  The goal of treatment is to reduce your risk for a stroke. Treatment may include medicines to thin the blood and changes to diet and lifestyle.    Urinary Tract Infection    A urinary tract infection (UTI) is an infection of any part of the urinary tract, which includes the kidneys, ureters, bladder, and urethra. Risk factors include ignoring your need to urinate, wiping back to front if female, being an uncircumcised male, and having diabetes or a weak immune system. Symptoms include frequent urination, pain or burning with urination, foul smelling urine, cloudy urine, pain in the lower abdomen, blood in the urine, and fever. If you were prescribed an antibiotic medicine, take it as told by your health care provider. Do not stop taking the antibiotic even if you start to feel better.    SEEK IMMEDIATE MEDICAL CARE IF YOU HAVE ANY OF THE FOLLOWING SYMPTOMS: severe back or abdominal pain, fever, inability to keep fluids or medicine down, dizziness/lightheadedness, or a change in mental status.

## 2023-07-07 NOTE — ED ADULT NURSE REASSESSMENT NOTE - NS ED NURSE REASSESS COMMENT FT1
Pt handoff received from previous RN. pt resting comfortably at this time. pt denies any discomfort. pt pending MRI

## 2023-07-07 NOTE — CHART NOTE - NSCHARTNOTEFT_GEN_A_CORE
Discussion with: Devon Cerda via audio only consult    HISTORY OF PRESENT ILLNESS:  Patient is a 29 y/o F with a PMHx of glioma resection, who p/w a sudden headache associated with nausea, vomiting, and dizziness 1 hour PTA. Patient initially described the dizziness as room spinning but now describes it as more lightheadedness. Patient also noticed numbness in the right lower extremity as well which has now resolved. At present patient has an NIHSS of 1 due to a chronic decrease in sensation on the right side of the body. She is able to ambulate without assistance. She has had similar symptoms in the past.     PMH/PSH is PAST MEDICAL & SURGICAL HISTORY:  Brain lesion  Glioma of brain  History of craniotomy    Pre-stroke MRS=    *Modified Vandana Score   0 - No symptoms.    CT HEAD IMAGING RESULTS:  CT Head: 1. No acute intracranial hemorrhage, no acute mass effect, and no acute   large transcortical infarction.  2. Stable left parietal resection cavity with stable encephalomalacia and   gliosis without evidence of vasogenic edema.  CTA head and neck: no LVO  CTP: limited study degraded by motion artifact     Labs:  CAPILLARY BLOOD GLUCOSE      POCT Blood Glucose.: 82 mg/dL (06 Jul 2023 22:35)    Platelet Count - Automated: 201 K/uL (07-06-23 @ 22:31)    PT/INR - ( 06 Jul 2023 22:31 )   PT: 13.6 sec;   INR: 1.16          PTT - ( 06 Jul 2023 22:31 )  PTT:32.7 sec  NEUROLOGIC EXAMINATION deferred – interprofessional audio discussion only     Inclusion Criteria:  Clearly defined time onset within 4.5 hours of treatment Yes [] No []    Ischemic stroke with a measurable deficit on NIHSS or a non-measurable deficit that is deemed disabling?  Yes [] no []  or  Unclear time of onset wake-up with stroke symptoms yes [] no[]  If yes:  [] Treatment can be initiated within 4.5 hrs. from symptom recognition  [] MRI can be obtained acutely from the emergency department  [] Not an endovascular stroke therapy candidate  [] Baseline functional independence.  Pre-stroke mRS of 0-1  [] NIHSS less than 26  [] DW-MRI with diffusion-positive. FLAIR – negative lesions indicating timing of stroke onset less than 4.5 hrs.  [] MRI mismatch between abnormal signal on DW-MRI and no visible signal change on FLAIR        Review thrombolytic CONTRAINDICATIONS  [] None    ABSOLUTE EXCLUSION CRITERIA:  [] Patient outside of the appropriate time window for IV thrombolysis  [] Evidence of intracranial hemorrhage on pretreatment CT scan (CT must be read by an attending radiologist or attending neurologist)  [] Head CT findings suggesting an established acute cerebral infarction as evidenced by maynor hypodensity, regardless of size.  [] Clinical presentation consistent with subarachnoid hemorrhage, even with normal CT scan  [] Active internal bleeding  [] Known bleeding diathesis (including but not limited to platelet count < 100,000, use of oral anticoagulants with INR>1.7, use of full dose low molecular       weight heparin within the last 24 hours, use of unfractionated heparin AND a prolonged PTT (> 40sec), use of direct thrombin inhibitor (e.g. dabigatran) or oral direct Factor Xa inhibitor (e.g. rivaroxaban, apixaban) within 48 hours) with any degree of abnormality on any coagulation test; any DOAC taken within 3 hours of presentation, regardless of coagulation test results  [] Systolic pressure >= 185 mmHg or diastolic pressure 110 mmHg on repeated measurements at the time treatment is to begin  [] Care team unable to determine eligibility for IV thrombolysis  [] Patient, family, or surrogate declines and understands risks and benefits of treatment.  [] For wake-up Protocol patients: DW-MRI lesions larger than one-third of the MCA territory    RELATIVE EXCLUSION CRITERIA  [] Isolated neurological deficits (except for aphasia or hemianopsia)  [x] stroke severity too mild (non-disabling)  [] Seizure at onset with post-ictal residual neurological impairment  [] Gastrointestinal, genitourinary or respiratory hemorrhage within 21 days   [] Major surgery within 14 days   [] Blood glucose level < 50 or > 400 mG/dL  [] CPR with chest compressions or minor surgery (including liver and kidney biopsy, thoracocentesis, lumbar puncture) within 10 days   [] Arterial puncture at non-compressible site within 7 days   [] Evidence of acute trauma (fracture)   Significant head trauma or prior stroke in the previous 3 months  History of previous intracranial hemorrhage  Intracranial or intraspinal surgery within past 3 months[] Diabetic hemorrhagic retinopathy or ophthalmic bleeding  [] Pregnancy or peripartum; no nursing post- treatment  [] Post-myocardial infarction pericarditis  [] Peritoneal dialysis or hemodialysis or severe hepatic disease   [] Known bacterial endocarditis   [] Life expectancy less than 6 months or sever co-morbid illness   [] Known cerebral vascular malformation, untreated intracranial aneurysm or brain tumor (may consider IV alteplase in patients with CNS lesions that have a very low likelihood of hemorrhage, such as small un-ruptured aneurysms or benign tumors with low vascularity.  [] Social/Moravian reason  [] Other ____________________    ASSESSMENT: Patient is a 29 y/o F with a PMHx of glioma resection, who p/w a sudden headache associated with nausea, vomiting, and dizziness associated with RLE numbness 1 hour PTA.  At present patient has an NIHSS of 1 due to a chronic decrease in sensation on the right side of the body. She is able to ambulate without assistance and has no new focal deficits. CTH read no acute infract and CTA read no LVO. Patient is not a current TNK candidate as she has no new focal disabling deficit. Not a Neuro IR candidate as there is no LVO.     RECOMMENDATIONS:  [] consider MRI Brain   [] not a current neuro IR or TNK candidate

## 2023-07-07 NOTE — ED ADULT NURSE REASSESSMENT NOTE - NS ED NURSE REASSESS COMMENT FT1
Pt received from ART[facility] Pleitez. Moved to Rm 22. Pt in NAD, denies HA, nausea, or lightheadedness. Reports feeling better.

## 2023-07-07 NOTE — ED CDU PROVIDER DISPOSITION NOTE - CLINICAL COURSE
Patient has no known patient-specific risk factors for stroke. Unclear at this time if symptoms due to TIA or other neurologic cause. will hold medication pending neurology follow up.    Patient evaluated for rehab, none required.    Patient educated about follow up. If no follow up appointment scheduled by time of discharge, the TIA clinic will reach out to the patient within 3 days to schedule follow up.

## 2023-07-07 NOTE — ED CDU PROVIDER INITIAL DAY NOTE - PROGRESS NOTE DETAILS
Patient signed out to me by Dr. Colorado pending MRI brain. Patient is resting comfortably, NAD. Feels at baseline. NIHSS 1 (baseline). Patient is resting comfortably, NAD. Declined further observation. Unclear at this time if symptoms were due to TIA or other neurologic cause. Will f/u with neurology. Patient also already made f/u appt with cardiology as her HR intermittently dropped to 40's, and she has a history of being lightheaded when she stands for years. Patient also reports mild dysuria. Has h/o frequent UTIs. Treated with fosfamycin 1 week ago, sx improved but did not resolve. Will Rx cefpodoxime.

## 2023-07-10 ENCOUNTER — APPOINTMENT (OUTPATIENT)
Dept: NEUROLOGY | Facility: CLINIC | Age: 28
End: 2023-07-10
Payer: MEDICAID

## 2023-07-10 ENCOUNTER — APPOINTMENT (OUTPATIENT)
Dept: NEUROLOGY | Facility: CLINIC | Age: 28
End: 2023-07-10

## 2023-07-10 ENCOUNTER — OUTPATIENT (OUTPATIENT)
Dept: OUTPATIENT SERVICES | Facility: HOSPITAL | Age: 28
LOS: 1 days | End: 2023-07-10
Payer: MEDICAID

## 2023-07-10 ENCOUNTER — APPOINTMENT (OUTPATIENT)
Dept: MRI IMAGING | Facility: HOSPITAL | Age: 28
End: 2023-07-10
Payer: MEDICAID

## 2023-07-10 VITALS
HEIGHT: 67 IN | BODY MASS INDEX: 16.17 KG/M2 | OXYGEN SATURATION: 100 % | SYSTOLIC BLOOD PRESSURE: 105 MMHG | TEMPERATURE: 97.9 F | DIASTOLIC BLOOD PRESSURE: 67 MMHG | WEIGHT: 103 LBS | HEART RATE: 50 BPM

## 2023-07-10 DIAGNOSIS — R63.6 UNDERWEIGHT: ICD-10-CM

## 2023-07-10 DIAGNOSIS — K59.00 CONSTIPATION, UNSPECIFIED: ICD-10-CM

## 2023-07-10 DIAGNOSIS — Z98.890 OTHER SPECIFIED POSTPROCEDURAL STATES: Chronic | ICD-10-CM

## 2023-07-10 PROCEDURE — A9585: CPT

## 2023-07-10 PROCEDURE — 99215 OFFICE O/P EST HI 40 MIN: CPT

## 2023-07-10 PROCEDURE — 70553 MRI BRAIN STEM W/O & W/DYE: CPT

## 2023-07-10 PROCEDURE — 70553 MRI BRAIN STEM W/O & W/DYE: CPT | Mod: 26

## 2023-07-11 NOTE — DISCUSSION/SUMMARY
[FreeTextEntry1] : In summary, Phylicia is a 27 yo woman with a h/o a left parietal low grade astrocytoma, IDH mutant and MGMT unmethylated - found incidentally at age 14 after she fell from a horse and radiographically progressed - had a resection in 10/2018 (Dr. Gardiner) and was followed expectantly.\jose Began to have focal seizures in 6/2019 associated with progressive flair increased and for that reason was treated with RT/TMZ and 6 cycles of adjuvant TMZ which completed in 9/2022.\par She has been followed expectantly with serial imaging and clinical exams since that time.\par Last week in the ED - semiology of event sounds more like vasovagal and anxiety - not typical seizure fore her (no focal shaking).\par HR low - per mother has been noticed back since her surgery in 2018.\par She has an appt with cardiology next month.\par MRI today shows no new enhancement - await official report re stability of flair change and perfusion.\par Reinforced compliance with Keppra q 12 hours.\par Low body weight - she is underweight - she and her boyfriend reports that she eats " a lot" but I think she would benefit from nutritional assessment - in addition will check TFTs and iron.\par Encouraged her to gain 5 lbs in next 2 months.\par Patient to call me on 7/12 to discuss blood results and final MRI report -if indeed no changes, plan is to follow up in 2 months.

## 2023-07-11 NOTE — PHYSICAL EXAM
[FreeTextEntry1] : On exam, she is awake and alert - thin appearing in NAD.\par \par \par T 97.9F, /67, P 50\par Weight is 103 lbs (in May was 106 lbs) and she is 5'7"\par \par She is alert and oriented - fluent with normal reception.\par EOMI, VFF\par Face symmetric and tongue midline\par No drift\par Strength full in UE and LE all groups\par No extinction\par Ambulating independently and steadily.

## 2023-07-11 NOTE — HISTORY OF PRESENT ILLNESS
[FreeTextEntry1] : Phylicia Dyer is a 26 yo female whom I am following for a low grade astrocytoma, IDH mutant\par \par Initial MRI scan when she was 14 years old, living in [place] and had fallen off of a horse. An MRI at that time showed an incidental mass and she was seen by a neurosurgeon in [place], who recommended expectant follow up and per history, radiographic changes were noted in 2016.\par \par 7/12/2018- Follow up with Dr Gardiner , no referable symptoms. AN MRI was repeated and resection was recommended.\par 10/29/2018- MRI showed a left parietal mass measuring 5.1 x 3.5 x 3.8 cm. 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 Post-operative MRI (personally reviewed) showed a GTR.\par She was then followed expectantly.\par By report, post-surgery she has 10 days of right sided weakness which then improved. She took Keppra initially which she felt made her very depressed. There was no history of seizure and Keppra was stopped.\par She has almost completed her treatment - Temodar is due to stop on the 8th and RT on the 12th. She denies any headache, nausea, seizure, or focal weakness. Weekly CBCs have held up well.\par \par 6/2019 - she had an episode of lightheadedness followed by right LE numbness and then shaking of the right LE.\par SHe was started on Lamictal - she tolerated this but reportedly did not refill.\par She then had 3 more episodes and saw Dr. Hardin in March 2020. MRI at that time showed increased flair changes anterior to the resection cavity. Lamictal was restarted with a plan to up-titrate.\par \par Seizures continued despite medication. MRI, last performed on 9/12/2021 -over time flair abnormality has progressed anteriorly and also through the posterior corpus callosum. There is no enhancement and no hyperperfusion. \par 11/30- 1/12/2022 - ChemoRT course\par 3/7/2022 - MRI stable\par 3/14-3/18- TMZ first cycle \par 4/22-4/26 TMZ cycle 2\par 5/24-5/28 - TMZ 3rd cycle - reduced back to 150 mg / m2\par 6/28-7/2- TMZ 4th cycle \par 7/13/2022 - MRI stable\par 8/15-8/19/2022 - 5th cycle of TMZ ( Delayed as [place] out of [place] and no baseline blood works) \par 9/21 - MRI reviewed and is stable - no new enhancement - flair essentially stable and no hyperperfusion \par 9/23-9/27- TMZ 6th cycle\par 11/14/2022- MRI stable\par 3/1/2023- MRI stable \par 5/1/2023- MRI stable \par 7/10/2023- Here for a follow up along with a new MRI. Compliant with Keppra 500 mg in am and 1000 mg in pm. On Thursday July 6 evening was working at [employer] not particular busy evening had not eaten dinner, poor hydration felt lightheaded and nauseous while walking. +cold sweat and headache. Developed anxiety, hyperventilation, nausea. Felt better when sitting. Did not vomit. No shaking or numbness but +tingling of head and right leg tingling (baseline during anxiety since surgery). Took taxi to ER. Stroke code was called was ruled out for stroke MRI negative for stroke. HR ranged between 60 and 45, occasional lightheadedness when standing up too fast which she is going to see a cardiology. Does not drink much water. No headaches besides a few days ago. Long term insomnia gets 7-8 hours which is stable from prior. Denied memory issues, personality changes. No ETOH or drugs. +constipation and feeling cold\par

## 2023-07-24 LAB
IRON SATN MFR SERPL: 20 %
IRON SERPL-MCNC: 72 UG/DL
TIBC SERPL-MCNC: 358 UG/DL
TSH SERPL-ACNC: 1.93 UIU/ML
UIBC SERPL-MCNC: 285 UG/DL

## 2023-08-17 ENCOUNTER — NON-APPOINTMENT (OUTPATIENT)
Age: 28
End: 2023-08-17

## 2023-08-29 ENCOUNTER — APPOINTMENT (OUTPATIENT)
Dept: HEART AND VASCULAR | Facility: CLINIC | Age: 28
End: 2023-08-29
Payer: MEDICAID

## 2023-08-29 ENCOUNTER — NON-APPOINTMENT (OUTPATIENT)
Age: 28
End: 2023-08-29

## 2023-08-29 VITALS
HEIGHT: 67 IN | OXYGEN SATURATION: 100 % | DIASTOLIC BLOOD PRESSURE: 66 MMHG | HEART RATE: 62 BPM | TEMPERATURE: 97.3 F | BODY MASS INDEX: 16.17 KG/M2 | SYSTOLIC BLOOD PRESSURE: 105 MMHG | WEIGHT: 103 LBS

## 2023-08-29 DIAGNOSIS — I49.8 OTHER SPECIFIED CARDIAC ARRHYTHMIAS: ICD-10-CM

## 2023-08-29 PROCEDURE — 99203 OFFICE O/P NEW LOW 30 MIN: CPT | Mod: 25

## 2023-08-29 PROCEDURE — 93000 ELECTROCARDIOGRAM COMPLETE: CPT

## 2023-09-05 NOTE — DISCUSSION/SUMMARY
[FreeTextEntry1] : 28 y.o. with low grade L parietal low-grade astocytoma s/p L parietal craniotomy 10/2018 with Dr. Gardiner, IDH mutant, focal seizures in 6/2019 associated with progressive flair increased and for that reason was treated with RT/TMZ and 6 cycles of adjuvant TMZ which completed in 9/2022, now seizure free for past year, she felt lightheaded in June 2023 while at work and she presented to the ED and stroke was ruled out. While in the ED she was noted to have HR fluctuations and some sinus bradycardia.  Sinus bradycardia noted on EKG today. Discussed that this is a benign finding but I would like to better understand her rhythm long-term. I will obtain a 2 week monitor. She will call two weeks after returning it to discuss the results.

## 2023-09-05 NOTE — END OF VISIT
[FreeTextEntry3] : I, Lili Oswald, am scribing for (in the presence of) Dr. Hong the following sections: HPI, PMH,Family/social history, ROS, Physical Exam, Assessment / Plan.   I, Joseluis Hong, personally performed the services described in the documentation, reviewed the documentation recorded by the scribe in my presence and it accurately and completely records my words and actions.

## 2023-09-05 NOTE — REASON FOR VISIT
[FreeTextEntry1] : 28 y.o. with low grade L parietal low-grade astocytoma s/p L parietal craniotomy 10/2018 with Dr. Gardiner, IDH mutant, focal seizures in 6/2019 associated with progressive flair increased and for that reason was treated with RT/TMZ and 6 cycles of adjuvant TMZ which completed in 9/2022, now seizure free for past year, she felt lightheaded in June 2023 while at work and she presented to the ED and stroke was ruled out. While in the ED she was noted to have HR fluctuations and some sinus bradycardia. She spoke to Dr. Gardiner about this and he recommended she see Dr. Hong.   Last seizure one year ago - on Keppra.  Colonoscopy scheduled for tomorrow.

## 2023-09-06 ENCOUNTER — APPOINTMENT (OUTPATIENT)
Dept: HEART AND VASCULAR | Facility: CLINIC | Age: 28
End: 2023-09-06
Payer: MEDICAID

## 2023-09-06 PROCEDURE — 93248 EXT ECG>7D<15D REV&INTERPJ: CPT

## 2023-09-11 ENCOUNTER — APPOINTMENT (OUTPATIENT)
Dept: NEUROLOGY | Facility: CLINIC | Age: 28
End: 2023-09-11

## 2023-09-12 ENCOUNTER — APPOINTMENT (OUTPATIENT)
Dept: MRI IMAGING | Facility: HOSPITAL | Age: 28
End: 2023-09-12
Payer: MEDICAID

## 2023-09-12 ENCOUNTER — OUTPATIENT (OUTPATIENT)
Dept: OUTPATIENT SERVICES | Facility: HOSPITAL | Age: 28
LOS: 1 days | End: 2023-09-12
Payer: MEDICAID

## 2023-09-12 DIAGNOSIS — Z98.890 OTHER SPECIFIED POSTPROCEDURAL STATES: Chronic | ICD-10-CM

## 2023-09-12 PROCEDURE — A9585: CPT

## 2023-09-12 PROCEDURE — 70553 MRI BRAIN STEM W/O & W/DYE: CPT

## 2023-09-12 PROCEDURE — 70553 MRI BRAIN STEM W/O & W/DYE: CPT | Mod: 26

## 2023-09-14 ENCOUNTER — APPOINTMENT (OUTPATIENT)
Dept: NEUROLOGY | Facility: CLINIC | Age: 28
End: 2023-09-14
Payer: MEDICAID

## 2023-09-14 PROCEDURE — 99215 OFFICE O/P EST HI 40 MIN: CPT | Mod: 95

## 2023-11-08 NOTE — ED ADULT NURSE NOTE - NSIMPLEMENTINTERV_GEN_ALL_ED
----- Message from Emi Emanuel LPN sent at 10/12/2023 10:51 AM CDT -----  Regardin month post procedure call  Call patient 1 month post procedure      Implemented All Universal Safety Interventions:  [place] to call system. Call bell, personal items and telephone within reach. Instruct patient to call for assistance. Room bathroom lighting operational. Non-slip footwear when patient is off stretcher. Physically safe environment: no spills, clutter or unnecessary equipment. Stretcher in lowest position, wheels locked, appropriate side rails in place.

## 2023-12-04 ENCOUNTER — APPOINTMENT (OUTPATIENT)
Dept: NEUROLOGY | Facility: CLINIC | Age: 28
End: 2023-12-04
Payer: MEDICAID

## 2023-12-04 ENCOUNTER — OUTPATIENT (OUTPATIENT)
Dept: OUTPATIENT SERVICES | Facility: HOSPITAL | Age: 28
LOS: 1 days | End: 2023-12-04
Payer: MEDICAID

## 2023-12-04 ENCOUNTER — APPOINTMENT (OUTPATIENT)
Dept: MRI IMAGING | Facility: HOSPITAL | Age: 28
End: 2023-12-04
Payer: MEDICAID

## 2023-12-04 ENCOUNTER — RESULT REVIEW (OUTPATIENT)
Age: 28
End: 2023-12-04

## 2023-12-04 VITALS
OXYGEN SATURATION: 99 % | WEIGHT: 107 LBS | DIASTOLIC BLOOD PRESSURE: 76 MMHG | HEIGHT: 67 IN | SYSTOLIC BLOOD PRESSURE: 109 MMHG | HEART RATE: 71 BPM | BODY MASS INDEX: 16.79 KG/M2

## 2023-12-04 VITALS
DIASTOLIC BLOOD PRESSURE: 76 MMHG | BODY MASS INDEX: 16.79 KG/M2 | WEIGHT: 107 LBS | HEIGHT: 67 IN | SYSTOLIC BLOOD PRESSURE: 109 MMHG

## 2023-12-04 DIAGNOSIS — Z98.890 OTHER SPECIFIED POSTPROCEDURAL STATES: Chronic | ICD-10-CM

## 2023-12-04 PROCEDURE — 70553 MRI BRAIN STEM W/O & W/DYE: CPT

## 2023-12-04 PROCEDURE — 70553 MRI BRAIN STEM W/O & W/DYE: CPT | Mod: 26

## 2023-12-04 PROCEDURE — 99215 OFFICE O/P EST HI 40 MIN: CPT

## 2023-12-04 PROCEDURE — A9585: CPT

## 2023-12-06 RX ORDER — LEVETIRACETAM 500 MG/1
500 TABLET, FILM COATED ORAL
Qty: 90 | Refills: 5 | Status: ACTIVE | COMMUNITY
Start: 2023-12-04 | End: 1900-01-01

## 2024-02-05 ENCOUNTER — APPOINTMENT (OUTPATIENT)
Dept: NEUROLOGY | Facility: CLINIC | Age: 29
End: 2024-02-05
Payer: MEDICAID

## 2024-02-05 ENCOUNTER — OUTPATIENT (OUTPATIENT)
Dept: OUTPATIENT SERVICES | Facility: HOSPITAL | Age: 29
LOS: 1 days | End: 2024-02-05
Payer: MEDICAID

## 2024-02-05 ENCOUNTER — APPOINTMENT (OUTPATIENT)
Dept: MRI IMAGING | Facility: HOSPITAL | Age: 29
End: 2024-02-05

## 2024-02-05 ENCOUNTER — RESULT REVIEW (OUTPATIENT)
Age: 29
End: 2024-02-05

## 2024-02-05 VITALS
TEMPERATURE: 98.6 F | BODY MASS INDEX: 16.01 KG/M2 | HEART RATE: 69 BPM | OXYGEN SATURATION: 100 % | DIASTOLIC BLOOD PRESSURE: 76 MMHG | HEIGHT: 67 IN | SYSTOLIC BLOOD PRESSURE: 125 MMHG | WEIGHT: 102 LBS

## 2024-02-05 DIAGNOSIS — Z98.890 OTHER SPECIFIED POSTPROCEDURAL STATES: Chronic | ICD-10-CM

## 2024-02-05 PROCEDURE — 99215 OFFICE O/P EST HI 40 MIN: CPT

## 2024-02-05 PROCEDURE — A9585: CPT

## 2024-02-05 PROCEDURE — 70553 MRI BRAIN STEM W/O & W/DYE: CPT

## 2024-02-05 PROCEDURE — 70553 MRI BRAIN STEM W/O & W/DYE: CPT | Mod: 26

## 2024-02-12 NOTE — HISTORY OF PRESENT ILLNESS
[FreeTextEntry1] : Phylicia Dyer is a 28 yo female whom I am following for a low grade astrocytoma, IDH mutant    Initial MRI scan when she was 14 years old, living in [place] and had fallen off of a horse. An MRI at that time showed an incidental mass and she was seen by a neurosurgeon in [place], who recommended expectant follow up and per history, radiographic changes were noted in 2016.    7/12/2018- Follow up with Dr Gardiner , no referable symptoms. AN MRI was repeated and resection was recommended.  10/29/2018- MRI showed a left parietal mass measuring 5.1 x 3.5 x 3.8 cm. This pre-operative scan was without contrast although by chart review - her previous scans that had been performed with contrast showed that this lesion was non-enhancing.  10/29/2018 - Resection of this mass by Dr Gardiner pathology was a diffuse low grade astrocytoma,  IDH mutant, 1p and 19q NON co-deleted, and MGMT unmethylated. Post-operative MRI (personally reviewed) showed a GTR.  She was then followed expectantly.  By report, post-surgery she has 10 days of right sided weakness which then improved. She took Keppra initially which she felt made her very depressed. There was no history of seizure and Keppra was stopped.  She has almost completed her treatment - Temodar is due to stop on the 8th and RT on the 12th. She denies any headache, nausea, seizure, or focal weakness. Weekly CBCs have held up well.    6/2019 - she had an episode of lightheadedness followed by right LE numbness and then shaking of the right LE.  SHe was started on Lamictal - she tolerated this but reportedly did not refill.  She then had 3 more episodes and saw Dr. Hardin in March 2020. MRI at that time showed increased flair changes anterior to the resection cavity. Lamictal was restarted with a plan to up-titrate.    Seizures continued despite medication. MRI, last performed on 9/12/2021 -over time flair abnormality has progressed anteriorly and also through the posterior corpus callosum. There is no enhancement and no hyperperfusion.  11/30- 1/12/2022 - ChemoRT course  3/7/2022 - MRI stable  3/14-3/18- TMZ first cycle  4/22-4/26 TMZ cycle 2  5/24-5/28 - TMZ 3rd cycle - reduced back to 150 mg / m2  6/28-7/2- TMZ 4th cycle  7/13/2022 - MRI stable  8/15-8/19/2022 - 5th cycle of TMZ (Delayed as [place] out of [place] and no baseline blood work.  9/21 - MRI reviewed and is stable - no new enhancement - flair essentially stable and no hyperperfusion.  9/23-9/27- TMZ 6th cycle  11/14/2022, 3/1/2022, 5/1/2023- MRI stable  7/10/2023- MRI stable - patient with bradycardia - Recommended to follow up with Cardiologist. 9/26/2023- MRI stable- 12/5/2023- Neurologically stable. she remains seizure free on Keppra 500 - 1000 and reports good compliance. MRI showed Since 9/12/2023, nonenhancing signal abnormality surrounding the surgical cavity extends more anteriorly into left corona radiata and has more clearly progressed compared to remote examination such as 9/19/2022. Plan was made to repeat imaging in 2 months 2/5/2024- Here for a follow up along with a new MRI.  Since our last visit, she has had no interval neurologic complaints - she remains seizure free and has no headaches. She and her boyfriend broke up about one month ago and she has had a hard time with this. She denies depression but admits to anxiety. She has lost weight and is underweight to begin with.

## 2024-02-12 NOTE — PHYSICAL EXAM
[FreeTextEntry1] : On exam, she is awake and alert - thin appearing in NAD.  /76, P 69   She is alert and oriented - fluent with normal reception. EOMI, VFF Face symmetric and tongue midline No drift Strength full in UE and LE all groups No extinction Ambulating independently and steadily.

## 2024-02-12 NOTE — DISCUSSION/SUMMARY
[FreeTextEntry1] : In summary, this is a 28 yo woman with a h/o a left parietal IDH mutant progressive diffuse astrocytoma - resected in 2018 - treated with RT/Temodar - last Temodar in 9/2022. Neurologically stable and seizure free since September. MRI today: IMPRESSION:  Since prior MRI 12/4/2023, previously noted hazy signal abnormality in the left parietal lobe extending anteriorly to the left corona radiata is less prominent which may be due to differences in technique. No new areas of signal abnormality or enhancement. Otherwise, stable exam.  Plan is to follow up with repeat MRI in 3 months - if any new issues or questions, she and her mother know to call me.

## 2024-03-20 ENCOUNTER — APPOINTMENT (OUTPATIENT)
Age: 29
End: 2024-03-20
Payer: MEDICAID

## 2024-03-20 ENCOUNTER — OUTPATIENT (OUTPATIENT)
Dept: OUTPATIENT SERVICES | Facility: HOSPITAL | Age: 29
LOS: 1 days | Discharge: HOME | End: 2024-03-20

## 2024-03-20 DIAGNOSIS — Z98.890 OTHER SPECIFIED POSTPROCEDURAL STATES: Chronic | ICD-10-CM

## 2024-03-20 PROCEDURE — 99204 OFFICE O/P NEW MOD 45 MIN: CPT | Mod: 95

## 2024-03-20 NOTE — REASON FOR VISIT
[Patient preference] : as per patient preference [Telehealth (audio & video) - Individual/Group] : This visit was provided via telehealth using real-time 2-way audio visual technology. [Other Location: e.g. Home (Enter Location, City,State)___] : The provider was located at [unfilled]. [Verbal consent obtained from patient/other participant(s)] : Verbal consent for telehealth/telephonic services obtained from patient/other participant(s) [Home] : The patient, [unfilled], was located at home, [unfilled], at the time of the visit. [[facility] Provider/Facility] : [facility] Provider/Facility [Patient] : Patient [FreeTextEntry1] : [facility] psychiatry Dr Conner- psycho-oncology service

## 2024-03-20 NOTE — REASON FOR VISIT
[Patient preference] : as per patient preference [Telehealth (audio & video) - Individual/Group] : This visit was provided via telehealth using real-time 2-way audio visual technology. [Other Location: e.g. Home (Enter Location, City,State)___] : The provider was located at [unfilled]. [Verbal consent obtained from patient/other participant(s)] : Verbal consent for telehealth/telephonic services obtained from patient/other participant(s) [Home] : The patient, [unfilled], was located at home, [unfilled], at the time of the visit. [[facility] Provider/Facility] : [facility] Provider/Facility [Patient] : Patient [FreeTextEntry1] : [facility] psychiatry Dr Conner- psycho-oncology service

## 2024-03-20 NOTE — DISCUSSION/SUMMARY
[Initial Plan] : Initial Plan [Attempting to realize their potential] : Attempting to realize their potential [Financially stable] : financially stable [FreeTextEntry3] : 3/20/24 [FreeTextEntry5] : understand trauma [Mental Health] : Mental Health [Initial] : Initial [every ___ weeks] : every [unfilled] weeks [de-identified] : pt will describe improved insight regarding prior life events [de-identified] : 9/20/24 [de-identified] : 9/20/24 [de-identified] : pt will report improved relationship with family [Date of Last Physical Exam: _____] : Date of Last Physical Exam: [unfilled] [Date of Last Annual Labs: _____] : Date of Last Annual Labs: [unfilled] [Low acute suicide risk] : Low acute suicide risk [FreeTextEntry1] : no acuity, no SI or prior hx of dangerousness, acute and chronic risks are low [No] : No

## 2024-03-20 NOTE — PHYSICAL EXAM
[5] : 5 [6] : 6 [3] : 3 [4] : 4 [SchwartzScore] : 47 [Well groomed] : well groomed [Cooperative] : cooperative [Euthymic] : euthymic [Full] : full [Clear] : clear [Linear/Goal Directed] : linear/goal directed [None] : none [None Reported] : none reported [Average] : average [WNL] : within normal limits
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
## 2024-03-20 NOTE — PHYSICAL EXAM
[5] : 5 [3] : 3 [6] : 6 [4] : 4 [SchwartzScore] : 47 [Well groomed] : well groomed [Cooperative] : cooperative [Euthymic] : euthymic [Full] : full [Clear] : clear [Linear/Goal Directed] : linear/goal directed [None] : none [None Reported] : none reported [Average] : average [WNL] : within normal limits
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
## 2024-03-20 NOTE — DISCUSSION/SUMMARY
[Initial Plan] : Initial Plan [Financially stable] : financially stable [Attempting to realize their potential] : Attempting to realize their potential [FreeTextEntry3] : 3/20/24 [FreeTextEntry5] : understand trauma [Mental Health] : Mental Health [every ___ weeks] : every [unfilled] weeks [Initial] : Initial [de-identified] : pt will describe improved insight regarding prior life events [de-identified] : 9/20/24 [de-identified] : pt will report improved relationship with family [de-identified] : 9/20/24 [Date of Last Physical Exam: _____] : Date of Last Physical Exam: [unfilled] [Date of Last Annual Labs: _____] : Date of Last Annual Labs: [unfilled] [Low acute suicide risk] : Low acute suicide risk [No] : No [FreeTextEntry1] : no acuity, no SI or prior hx of dangerousness, acute and chronic risks are low

## 2024-03-27 DIAGNOSIS — F41.9 ANXIETY DISORDER, UNSPECIFIED: ICD-10-CM

## 2024-03-27 DIAGNOSIS — F43.20 ADJUSTMENT DISORDER, UNSPECIFIED: ICD-10-CM

## 2024-05-18 ENCOUNTER — EMERGENCY (EMERGENCY)
Facility: HOSPITAL | Age: 29
LOS: 1 days | Discharge: HOME | End: 2024-05-18
Admitting: EMERGENCY MEDICINE
Payer: COMMERCIAL

## 2024-05-18 VITALS
HEART RATE: 74 BPM | DIASTOLIC BLOOD PRESSURE: 76 MMHG | RESPIRATION RATE: 16 BRPM | OXYGEN SATURATION: 98 % | SYSTOLIC BLOOD PRESSURE: 117 MMHG | TEMPERATURE: 98 F

## 2024-05-18 DIAGNOSIS — Z98.890 OTHER SPECIFIED POSTPROCEDURAL STATES: Chronic | ICD-10-CM

## 2024-05-18 LAB
FLUAV AG NPH QL: SIGNIFICANT CHANGE UP
FLUBV AG NPH QL: SIGNIFICANT CHANGE UP
RSV RNA NPH QL NAA+NON-PROBE: SIGNIFICANT CHANGE UP
SARS-COV-2 RNA SPEC QL NAA+PROBE: SIGNIFICANT CHANGE UP

## 2024-05-18 PROCEDURE — 71046 X-RAY EXAM CHEST 2 VIEWS: CPT | Mod: 26

## 2024-05-18 PROCEDURE — 99284 EMERGENCY DEPT VISIT MOD MDM: CPT | Mod: 25

## 2024-05-18 NOTE — ED PROVIDER NOTE - OBJECTIVE STATEMENT
29-year-old otherwise healthy female presents today with her mother complaining of a cough for 3 days.  Patient states that today she noticed some blood-tinged sputum in her cough and in her nasal mucus so she wanted to come to the ER to be evaluated for an infection.    She also notes a sore throat. She denies fever, chills, shortness of breath, chest pain, extremity swelling, headache.  No medications taken prior to arrival.

## 2024-05-18 NOTE — ED ADULT NURSE NOTE - HIV OFFER
Last OV 1/24/18
Refill of metoprolol succinate 25mg completed per VO of Dr. Easton Record.     Last OV: 1/2018  Next OV: FU as needed
Previously Declined (within the last year)

## 2024-05-18 NOTE — ED PROVIDER NOTE - NSFOLLOWUPINSTRUCTIONS_ED_ALL_ED_FT
Try using a cold air humidifier to reduce the inflammation and dryness that is contributing to the cough.      You can also take over-the-counter cough medication including dextromethorphan or guaifenesin.     Viral Respiratory Infection  A viral respiratory infection is an illness that affects parts of the body used for breathing, like the lungs, nose, and throat. It is caused by a germ called a virus.    ImageSome examples of this kind of infection are:    A cold.  The flu (influenza).  A respiratory syncytial virus (RSV) infection.    How do I know if I have this infection?  Most of the time this infection causes:    A stuffy or runny nose.  Yellow or green fluid in the nose.  A cough.  Sneezing.  Tiredness (fatigue).  Achy muscles.  A sore throat.  Sweating or chills.  A fever.  A headache.    How is this infection treated?  If the flu is diagnosed early, it may be treated with an antiviral medicine. This medicine shortens the length of time a person has symptoms. Symptoms may be treated with over-the-counter and prescription medicines, such as:    Expectorants. These make it easier to cough up mucus.  Decongestant nasal sprays.    Doctors do not prescribe antibiotic medicines for viral infections. They do not work with this kind of infection.    How do I know if I should stay home?  To keep others from getting sick, stay home if you have:    A fever.  A lasting cough.  A sore throat.  A runny nose.  Sneezing.  Muscles aches.  Headaches.  Tiredness.  Weakness.  Chills.  Sweating.  An upset stomach (nausea).    Follow these instructions at home:  Rest as much as possible.  Take over-the-counter and prescription medicines only as told by your doctor.  Drink enough fluid to keep your pee (urine) clear or pale yellow.  Gargle with salt water. Do this 3–4 times per day or as needed. To make a salt–water mixture, dissolve ½–1 tsp of salt in 1 cup of warm water. Make sure the salt dissolves all the way.  Use nose drops made from salt water. This helps with stuffiness (congestion). It also helps soften the skin around your nose.  Do not drink alcohol.  Do not use tobacco products, including cigarettes, chewing tobacco, and e-cigarettes. If you need help quitting, ask your doctor.  Get help if:  Your symptoms last for 10 days or longer.  Your symptoms get worse over time.  You have a fever.  You have very bad pain in your face or forehead.  Parts of your jaw or neck become very swollen.  Get help right away if:  You feel pain or pressure in your chest.  You have shortness of breath.  You faint or feel like you will faint.  You keep throwing up (vomiting).  You feel confused.  This information is not intended to replace advice given to you by your health care provider. Make sure you discuss any questions you have with your health care provider.

## 2024-05-18 NOTE — ED PROVIDER NOTE - PATIENT PORTAL LINK FT
You can access the FollowMyHealth Patient Portal offered by [facility] by registering at the following website: [email or web address]. By joining [facility]’s FollowMyHealth portal, you will also be able to view your health information using other applications (apps) compatible with our system.

## 2024-05-18 NOTE — ED PROVIDER NOTE - CLINICAL SUMMARY MEDICAL DECISION MAKING FREE TEXT BOX
Patient presents today with complaint of cough with blood-tinged sputum and blood-tinged nasal mucus.  Will check flu/COVID/RSV and chest x-ray. X Size Of Lesion In Cm (Optional): 0.9

## 2024-05-21 DIAGNOSIS — J06.9 ACUTE UPPER RESPIRATORY INFECTION, UNSPECIFIED: ICD-10-CM

## 2024-05-21 DIAGNOSIS — Z20.822 CONTACT WITH AND (SUSPECTED) EXPOSURE TO COVID-19: ICD-10-CM

## 2024-05-21 DIAGNOSIS — R05.9 COUGH, UNSPECIFIED: ICD-10-CM

## 2024-05-21 DIAGNOSIS — Z91.048 OTHER NONMEDICINAL SUBSTANCE ALLERGY STATUS: ICD-10-CM

## 2024-05-21 DIAGNOSIS — Z88.0 ALLERGY STATUS TO PENICILLIN: ICD-10-CM

## 2024-05-21 DIAGNOSIS — Z88.2 ALLERGY STATUS TO SULFONAMIDES: ICD-10-CM

## 2024-06-03 ENCOUNTER — RESULT REVIEW (OUTPATIENT)
Age: 29
End: 2024-06-03

## 2024-06-03 ENCOUNTER — APPOINTMENT (OUTPATIENT)
Dept: NEUROLOGY | Facility: CLINIC | Age: 29
End: 2024-06-03
Payer: COMMERCIAL

## 2024-06-03 ENCOUNTER — APPOINTMENT (OUTPATIENT)
Dept: MRI IMAGING | Facility: HOSPITAL | Age: 29
End: 2024-06-03
Payer: COMMERCIAL

## 2024-06-03 ENCOUNTER — OUTPATIENT (OUTPATIENT)
Dept: OUTPATIENT SERVICES | Facility: HOSPITAL | Age: 29
LOS: 1 days | End: 2024-06-03

## 2024-06-03 ENCOUNTER — APPOINTMENT (OUTPATIENT)
Dept: NEUROLOGY | Facility: CLINIC | Age: 29
End: 2024-06-03

## 2024-06-03 VITALS
HEIGHT: 67 IN | DIASTOLIC BLOOD PRESSURE: 62 MMHG | BODY MASS INDEX: 16.48 KG/M2 | OXYGEN SATURATION: 96 % | HEART RATE: 75 BPM | TEMPERATURE: 98.1 F | SYSTOLIC BLOOD PRESSURE: 107 MMHG | WEIGHT: 105 LBS

## 2024-06-03 DIAGNOSIS — Z98.890 OTHER SPECIFIED POSTPROCEDURAL STATES: Chronic | ICD-10-CM

## 2024-06-03 PROCEDURE — A9585: CPT

## 2024-06-03 PROCEDURE — 70553 MRI BRAIN STEM W/O & W/DYE: CPT | Mod: 26

## 2024-06-03 PROCEDURE — 99215 OFFICE O/P EST HI 40 MIN: CPT

## 2024-06-03 PROCEDURE — 70553 MRI BRAIN STEM W/O & W/DYE: CPT

## 2024-06-03 NOTE — PHYSICAL EXAM
[FreeTextEntry1] :   She is alert and oriented - fluent with normal reception. EOMI, VFF Face symmetric and tongue midline No drift Strength full in UE and LE all groups No extinction Ambulating independently and steadily.

## 2024-06-03 NOTE — DISCUSSION/SUMMARY
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 I have reviewed her MRI from today and have compared to prior exams including 2/5/2024 and 12/4/2023 - to my review there is no new enhancement or flair extension. Will follow up official report. Plan is to repeat in 3 months. If any interim issues, she knows to call me.

## 2024-06-03 NOTE — HISTORY OF PRESENT ILLNESS
[FreeTextEntry1] :     Phylicia Dyer is a 30 yo female whom I am following for a low grade astrocytoma, IDH mutant    Initial MRI scan when she was 14 years old, living in [place] and had fallen off of a horse. An MRI at that time showed an incidental mass and she was seen by a neurosurgeon in [place], who recommended expectant follow up and per history, radiographic changes were noted in 2016.    7/12/2018- Follow up with Dr Gardiner , no referable symptoms. AN MRI was repeated and resection was recommended.  10/29/2018- MRI showed a left parietal mass measuring 5.1 x 3.5 x 3.8 cm. This pre-operative scan was without contrast although by chart review - her previous scans that had been performed with contrast showed that this lesion was non-enhancing.  10/29/2018 - Resection of this mass by Dr Gardiner pathology was a diffuse low grade astrocytoma,  IDH mutant, 1p and 19q NON co-deleted, and MGMT unmethylated. Post-operative MRI (personally reviewed) showed a GTR.  She was then followed expectantly.  By report, post-surgery she has 10 days of right sided weakness which then improved. She took Keppra initially which she felt made her very depressed. There was no history of seizure and Keppra was stopped.  She has almost completed her treatment - Temodar is due to stop on the 8th and RT on the 12th. She denies any headache, nausea, seizure, or focal weakness. Weekly CBCs have held up well.    6/2019 - she had an episode of lightheadedness followed by right LE numbness and then shaking of the right LE.  SHe was started on Lamictal - she tolerated this but reportedly did not refill.  She then had 3 more episodes and saw Dr. Hardin in March 2020. MRI at that time showed increased flair changes anterior to the resection cavity. Lamictal was restarted with a plan to up-titrate.    Seizures continued despite medication. MRI, last performed on 9/12/2021 -over time flair abnormality has progressed anteriorly and also through the posterior corpus callosum. There is no enhancement and no hyperperfusion.  11/30- 1/12/2022 - ChemoRT course  3/7/2022 - MRI stable  3/14-3/18- TMZ first cycle  4/22-4/26 TMZ cycle 2  5/24-5/28 - TMZ 3rd cycle - reduced back to 150 mg / m2  6/28-7/2- TMZ 4th cycle  7/13/2022 - MRI stable  8/15-8/19/2022 - 5th cycle of TMZ ([place] and no baseline blood work.  9/21 - MRI reviewed and is stable - no new enhancement - flair essentially stable and no hyperperfusion.  9/23-9/27- TMZ 6th cycle  11/14/2022, 3/1/2022, 5/1/2023- MRI stable  7/10/2023- MRI stable - patient with bradycardia - Recommended to follow up with Cardiologist. 9/26/2023- MRI stable- 12/5/2023- Neurologically stable. she remains seizure free on Keppra 500 - 1000 and reports good compliance. MRI showed Since 9/12/2023, non-enhancing signal abnormality surrounding the surgical cavity extends more anteriorly into left corona radiata and has more clearly progressed compared to remote examination such as 9/19/2022. Plan was made to repeat imaging in 2 months 2/5/2024- MRI showed Since prior MRI 12/4/2023, previously noted hazy signal abnormality in the left parietal lobe extending anteriorly to the left corona radiata is less prominent which may be due to differences in technique. No new areas of signal abnormality or enhancement. Otherwise, stable exam. 6/3/2024- Here for a follow up along with a new MRI. She is doing well - has no new complaints - remains seizure free and compliant with her Keppra 500 - 1000.

## 2024-06-19 ENCOUNTER — APPOINTMENT (OUTPATIENT)
Dept: PSYCHIATRY | Facility: CLINIC | Age: 29
End: 2024-06-19
Payer: COMMERCIAL

## 2024-06-19 PROCEDURE — 90834 PSYTX W PT 45 MINUTES: CPT | Mod: 95

## 2024-06-20 NOTE — PLAN
[Integrative Therapy] : Integrative Therapy  [de-identified] : Patient, Phylicia arrived on time for psychotherapy session. Phylicia and Marlee met for their initial therapy session and began by establishing therapeutic rapport and introductions. Marlee invited Phylicia to share more about her decision to seek out treatment at this time of her life and Phylicia was very open to discussing this. She shared that multiple people in her life have recommended therapy over recent years, including her oncologist following her recent chemotherapy treatment (ending 1 year ago). Phylicia shared she agreed it is a good idea for her to talk with a therapist regularly to reflect on that experience and to better understand her childhood and upbringing, which she described as extremely stressful and unhappy. Phylicia shared more detailed information about her personal and family history and was forthcoming with important information. She appears motivated and ready for treatment at this time and they agreed to meet again in 1 week. Patient ended session in good emotional and behavioral control.  [Recommended Frequency of Visits: ____] : Recommended frequency of visits: [unfilled] [Return in ____ week(s)] : Return in [unfilled] week(s) [FreeTextEntry1] : Attend weekly psychotherapy appointments with Marlee Storm

## 2024-06-20 NOTE — REASON FOR VISIT
[Patient preference] : as per patient preference [Telehealth (audio & video) - Individual/Group] : This visit was provided via telehealth using real-time 2-way audio visual technology. [Other Location: e.g. Home (Enter Location, City,State)___] : The provider was located at [unfilled]. [Home] : The patient, [unfilled], was located at home, [unfilled], at the time of the visit. [Verbal consent obtained from patient/other participant(s)] : Verbal consent for telehealth/telephonic services obtained from patient/other participant(s) [FreeTextEntry4] : 11:15am [FreeTextEntry5] : 12:00pm [Patient] : Patient

## 2024-06-20 NOTE — END OF VISIT
[Duration of Psychotherapy Visit (minutes spent in synchronous communication): ____] : Duration of Psychotherapy Visit (minutes spent in synchronous communication): [unfilled] [Individual Psychotherapy for 38-52 minutes] : Individual Psychotherapy for 38 - 52 minutes [Teletherapy Service Provided] : The services provided in this session were delivered via tele-therapy [FreeTextEntry3] [place] [FreeTextEntry5] [place] [Licensed Clinician] : Licensed Clinician

## 2024-06-26 ENCOUNTER — APPOINTMENT (OUTPATIENT)
Dept: PSYCHIATRY | Facility: CLINIC | Age: 29
End: 2024-06-26
Payer: COMMERCIAL

## 2024-06-26 DIAGNOSIS — F43.20 ADJUSTMENT DISORDER, UNSPECIFIED: ICD-10-CM

## 2024-06-26 PROCEDURE — 90834 PSYTX W PT 45 MINUTES: CPT | Mod: 95

## 2024-06-27 PROBLEM — F43.20 ADJUSTMENT DISORDER, UNSPECIFIED TYPE: Status: ACTIVE | Noted: 2024-03-20

## 2024-07-02 ENCOUNTER — NON-APPOINTMENT (OUTPATIENT)
Age: 29
End: 2024-07-02

## 2024-07-03 ENCOUNTER — APPOINTMENT (OUTPATIENT)
Dept: PSYCHIATRY | Facility: CLINIC | Age: 29
End: 2024-07-03

## 2024-07-10 ENCOUNTER — APPOINTMENT (OUTPATIENT)
Dept: PSYCHIATRY | Facility: CLINIC | Age: 29
End: 2024-07-10
Payer: COMMERCIAL

## 2024-07-10 PROCEDURE — 90834 PSYTX W PT 45 MINUTES: CPT | Mod: 95

## 2024-07-17 ENCOUNTER — APPOINTMENT (OUTPATIENT)
Dept: PSYCHIATRY | Facility: CLINIC | Age: 29
End: 2024-07-17

## 2024-07-17 ENCOUNTER — APPOINTMENT (OUTPATIENT)
Dept: PSYCHIATRY | Facility: CLINIC | Age: 29
End: 2024-07-17
Payer: COMMERCIAL

## 2024-07-17 DIAGNOSIS — F43.20 ADJUSTMENT DISORDER, UNSPECIFIED: ICD-10-CM

## 2024-07-17 PROCEDURE — 90834 PSYTX W PT 45 MINUTES: CPT | Mod: 95

## 2024-07-24 ENCOUNTER — APPOINTMENT (OUTPATIENT)
Dept: PSYCHIATRY | Facility: CLINIC | Age: 29
End: 2024-07-24

## 2024-07-30 ENCOUNTER — NON-APPOINTMENT (OUTPATIENT)
Age: 29
End: 2024-07-30

## 2024-07-31 ENCOUNTER — APPOINTMENT (OUTPATIENT)
Dept: PSYCHIATRY | Facility: CLINIC | Age: 29
End: 2024-07-31

## 2024-07-31 NOTE — DISCUSSION/SUMMARY
[FreeTextEntry1] : PT was not present at time of session. Marlee reached out to PT 10 mins into appointment time but did not get a response by time of writing this note.

## 2024-08-07 ENCOUNTER — APPOINTMENT (OUTPATIENT)
Dept: PSYCHIATRY | Facility: CLINIC | Age: 29
End: 2024-08-07

## 2024-08-07 PROCEDURE — 90834 PSYTX W PT 45 MINUTES: CPT | Mod: 95

## 2024-08-07 NOTE — PLAN
[Integrative Therapy] : Integrative Therapy  [Supportive Therapy] : Supportive Therapy [de-identified] : Patient, Phylicia arrived on time for psychotherapy session. Phylicia reported experiencing symptoms of anxiety and reflected on how this impacted missed appointment week prior. Marlee used an integrative approach in session, inviting Phylicia to identify and explore factors contributing to symptoms of anxiety in the present. Phylicia focused on exploring distress regarding interpersonal family dynamics both in the present and the past and worked on exploring associated feelings and thoughts. They intend to continue to work on developing insight and working on improving interpersonal dynamics. Patient ended session in good emotional and behavioral control.  [Recommended Frequency of Visits: ____] : Recommended frequency of visits: [unfilled] [Return in ____ week(s)] : Return in [unfilled] week(s) [FreeTextEntry1] : Attend weekly psychotherapy appointments with Marlee Storm

## 2024-08-14 ENCOUNTER — NON-APPOINTMENT (OUTPATIENT)
Age: 29
End: 2024-08-14

## 2024-08-14 ENCOUNTER — APPOINTMENT (OUTPATIENT)
Dept: PSYCHIATRY | Facility: CLINIC | Age: 29
End: 2024-08-14

## 2024-08-14 ENCOUNTER — APPOINTMENT (OUTPATIENT)
Dept: PSYCHIATRY | Facility: CLINIC | Age: 29
End: 2024-08-14
Payer: COMMERCIAL

## 2024-08-14 DIAGNOSIS — F41.9 ANXIETY DISORDER, UNSPECIFIED: ICD-10-CM

## 2024-08-14 DIAGNOSIS — F43.20 ADJUSTMENT DISORDER, UNSPECIFIED: ICD-10-CM

## 2024-08-14 PROCEDURE — 90834 PSYTX W PT 45 MINUTES: CPT | Mod: 95

## 2024-08-15 NOTE — DISCUSSION/SUMMARY
[Attempting to realize their potential] : Attempting to realize their potential [Financially stable] : financially stable [Mental Health] : Mental Health [every ___ weeks] : every [unfilled] weeks [Plan Revision] : Plan Revision [Able to manage surrounding demands and opportunities] : able to manage surrounding demands and opportunities [Able to exercise self-direction] : able to exercise self-direction [Able to set and pursue goals] : able to set and pursue goals [Adherent to treatment recommendations] : adherent to treatment recommendations [Articulate] : articulate [Awareness of substance use issues] : awareness of substance use issues [Cognitively intact] : cognitively intact [Insightful] : insightful [Creative] : creative [Intelligent] : intelligent [Motivated to participate in treatment] : motivated to participate in treatment [Motivated and ready for change] : motivated and ready for change [Health literacy] : health literacy [Motivated to maintain or improve physical health] : motivated to maintain or improve physical health [Has vocational interests or hobbies] : has vocational interests or hobbies [Involved in cultural/spiritual/Protestant/community activities] : involved in cultural/spiritual/Protestant/community activities [Housing stability] : housing stability [High level of education] : high level of education [English fluency] : English fluency [Connected to healthcare] : connected to healthcare [Access to safe outdoor spaces] : access to safe outdoor spaces [Social supports] : social supports [Revised - Rationale] : Revised - Rationale: [Continued - Progress made] : Continued - Progress made: [Initial] : Initial [Yes] : Yes [Therapist] : Therapist [Date of Last Physical Exam: _____] : Date of Last Physical Exam: [unfilled] [Tobacco Screening Completed?] : Tobacco Screening Completed: Yes [FreeTextEntry2] : 02/28/25 [FreeTextEntry3] : 3/20/24 [Improvement in symptoms as evidenced by:] : Improvement in symptoms as evidenced by: [None - Reason others did not participate:] : None - Reason others did not participate:  [FreeTextEntry1] : Distress associated with personal history  [FreeTextEntry4] : "to experience less emotional stress associated with past"  [de-identified] : Goal was missing from prior treatment plan and was added on 8/14/24  [de-identified] : Develop insight regarding prior life events during childhood  [de-identified] : 02/28/25 [de-identified] : Phylicia is continuing to work on this objective since beginning psychotherapy  [de-identified] : Improve coping skills and acceptance  [de-identified] : 02/28/25 [de-identified] : Will work on identifying presentation of emotional distress and strengthen coping mechanisms focused on emotional regulation  [FreeTextEntry5] : Psychotherapy focused on psychodynamic treatment.  [de-identified] : Marlee Storm LCSW (45mins) [de-identified] : Reported progress towards goals and objectives by Phylicia [de-identified] : Not clinically indicated

## 2024-08-15 NOTE — REASON FOR VISIT
[Patient preference] : as per patient preference [Telehealth (audio & video) - Individual/Group] : This visit was provided via telehealth using real-time 2-way audio visual technology. [Other Location: e.g. Home (Enter Location, City,State)___] : The provider was located at [unfilled]. [Home] : The patient, [unfilled], was located at home, [unfilled], at the time of the visit. [Verbal consent obtained from patient/other participant(s)] : Verbal consent for telehealth/telephonic services obtained from patient/other participant(s) [FreeTextEntry4] : 11:30am [FreeTextEntry5] : 12:15pm [Patient] : Patient

## 2024-08-15 NOTE — DISCUSSION/SUMMARY
[Attempting to realize their potential] : Attempting to realize their potential [Financially stable] : financially stable [Mental Health] : Mental Health [every ___ weeks] : every [unfilled] weeks [Plan Revision] : Plan Revision [Able to manage surrounding demands and opportunities] : able to manage surrounding demands and opportunities [Able to exercise self-direction] : able to exercise self-direction [Able to set and pursue goals] : able to set and pursue goals [Adherent to treatment recommendations] : adherent to treatment recommendations [Articulate] : articulate [Awareness of substance use issues] : awareness of substance use issues [Cognitively intact] : cognitively intact [Insightful] : insightful [Creative] : creative [Intelligent] : intelligent [Motivated to participate in treatment] : motivated to participate in treatment [Motivated and ready for change] : motivated and ready for change [Health literacy] : health literacy [Motivated to maintain or improve physical health] : motivated to maintain or improve physical health [Has vocational interests or hobbies] : has vocational interests or hobbies [Involved in cultural/spiritual/Amish/community activities] : involved in cultural/spiritual/Amish/community activities [Housing stability] : housing stability [High level of education] : high level of education [English fluency] : English fluency [Connected to healthcare] : connected to healthcare [Access to safe outdoor spaces] : access to safe outdoor spaces [Social supports] : social supports [Revised - Rationale] : Revised - Rationale: [Continued - Progress made] : Continued - Progress made: [Initial] : Initial [Yes] : Yes [Therapist] : Therapist [Date of Last Physical Exam: _____] : Date of Last Physical Exam: [unfilled] [Tobacco Screening Completed?] : Tobacco Screening Completed: Yes [FreeTextEntry2] : 02/28/25 [FreeTextEntry3] : 3/20/24 [Improvement in symptoms as evidenced by:] : Improvement in symptoms as evidenced by: [None - Reason others did not participate:] : None - Reason others did not participate:  [FreeTextEntry1] : Distress associated with personal history  [FreeTextEntry4] : "to experience less emotional stress associated with past"  [de-identified] : Goal was missing from prior treatment plan and was added on 8/14/24  [de-identified] : Develop insight regarding prior life events during childhood  [de-identified] : 02/28/25 [de-identified] : Phylicia is continuing to work on this objective since beginning psychotherapy  [de-identified] : Improve coping skills and acceptance  [de-identified] : 02/28/25 [de-identified] : Will work on identifying presentation of emotional distress and strengthen coping mechanisms focused on emotional regulation  [FreeTextEntry5] : Psychotherapy focused on psychodynamic treatment.  [de-identified] : Marlee Storm LCSW (45mins) [de-identified] : Reported progress towards goals and objectives by Phylicia [de-identified] : Not clinically indicated

## 2024-08-15 NOTE — PHYSICAL EXAM
[2 - 2  to 4 times a month] : 2 - 2  to 4 times a month [0 - 1 or 2] : 0 - 1 or 2 [0 - Never] : 0 - Never [0 - No] : 0 - No [4] : 4 [6] : 6 [2] : 2 [5] : 5 [3] : 3 [] : No [Individual reports tobacco use during the last 30 days?] : Individual reports tobacco use during the last 30 days? No [Individual reports use of the following tobacco products during the last 30 days?] : Individual reports use of the following tobacco products during the last 30 days? No [Individual reports current use of tobacco cessation medication or nicotine replacement therapy?] : Individual reports current use of tobacco cessation medication or nicotine replacement therapy? No [Was tobacco cessation medication and/or nicotine replacement therapy recommended?] : Was tobacco cessation medication and/or nicotine replacement therapy recommended? No [Does individual accept referral to MD for cessation medication or NRT?] : Does individual accept referral to MD for cessation medication or NRT? No [FreeTextEntry1] : 2 [SchwartzScore] : 39

## 2024-08-15 NOTE — PLAN
[FreeTextEntry2] : 1) develop insight 2) strengthen coping mechanisms  [Integrative Therapy] : Integrative Therapy  [Supportive Therapy] : Supportive Therapy [Recommended Frequency of Visits: ____] : Recommended frequency of visits: [unfilled] [de-identified] : Patient, Phylicia arrived on time for psychotherapy session. Phylicia and Marlee used session time to review and revise treatment plan based on current goals and level of engagement in treatment. Phylicia actively engaged with this process and was able to express her treatment goals at this time. Marlee also reminded Phylicia of her upcoming vacation time and they discussed alternative coping strategies to engage with during that time. Patient ended session in good emotional and behavioral control.  [Return in ____ week(s)] : Return in [unfilled] week(s) [FreeTextEntry1] : Attend weekly psychotherapy appointments with Marlee Storm

## 2024-08-15 NOTE — RISK ASSESSMENT
[No, patient denies ideation or behavior] : No, patient denies ideation or behavior [Low acute suicide risk] : Low acute suicide risk [No] : No [Not clinically indicated] : Safety Plan completed/updated (for individuals at risk): Not clinically indicated [FreeTextEntry8] : Denies SI and other risk factors [FreeTextEntry9] : Phylicia denies SI, SHB, and other risk factors

## 2024-08-21 ENCOUNTER — APPOINTMENT (OUTPATIENT)
Dept: PSYCHIATRY | Facility: CLINIC | Age: 29
End: 2024-08-21

## 2024-08-28 ENCOUNTER — APPOINTMENT (OUTPATIENT)
Dept: PSYCHIATRY | Facility: CLINIC | Age: 29
End: 2024-08-28

## 2024-09-02 ENCOUNTER — OUTPATIENT (OUTPATIENT)
Dept: OUTPATIENT SERVICES | Facility: HOSPITAL | Age: 29
LOS: 1 days | Discharge: HOME | End: 2024-09-02

## 2024-09-02 DIAGNOSIS — Z98.890 OTHER SPECIFIED POSTPROCEDURAL STATES: Chronic | ICD-10-CM

## 2024-09-04 ENCOUNTER — APPOINTMENT (OUTPATIENT)
Dept: PSYCHIATRY | Facility: CLINIC | Age: 29
End: 2024-09-04

## 2024-09-04 ENCOUNTER — APPOINTMENT (OUTPATIENT)
Dept: PSYCHIATRY | Facility: CLINIC | Age: 29
End: 2024-09-04
Payer: COMMERCIAL

## 2024-09-04 DIAGNOSIS — F43.20 ADJUSTMENT DISORDER, UNSPECIFIED: ICD-10-CM

## 2024-09-04 DIAGNOSIS — F41.9 ANXIETY DISORDER, UNSPECIFIED: ICD-10-CM

## 2024-09-04 PROCEDURE — 90834 PSYTX W PT 45 MINUTES: CPT | Mod: 95

## 2024-09-05 NOTE — PLAN
[FreeTextEntry2] : 1) develop insight 2) strengthen coping mechanisms  [Integrative Therapy] : Integrative Therapy  [Supportive Therapy] : Supportive Therapy [de-identified] : Patient, Phylicia arrived on time for psychotherapy session. Phylicia explored thoughts and feelings pertaining to interpersonal dynamics in the present and past. Marlee used integrative approach in session, asking open ended and clarifying questions to develop more detailed history of Phylicia's interpersonal relationships and family dynamics. She actively engaged, identifying some specific areas of distress historically and in the present at times. They intend to continue to explore these topics and address associated symptoms of anxiety. Patient ended session in good emotional and behavioral control.  [Recommended Frequency of Visits: ____] : Recommended frequency of visits: [unfilled] [Return in ____ week(s)] : Return in [unfilled] week(s) [FreeTextEntry1] : Attend weekly psychotherapy appointments with Marlee Storm

## 2024-09-06 ENCOUNTER — APPOINTMENT (OUTPATIENT)
Dept: MRI IMAGING | Facility: HOSPITAL | Age: 29
End: 2024-09-06

## 2024-09-06 ENCOUNTER — OUTPATIENT (OUTPATIENT)
Dept: OUTPATIENT SERVICES | Facility: HOSPITAL | Age: 29
LOS: 1 days | End: 2024-09-06
Payer: COMMERCIAL

## 2024-09-06 DIAGNOSIS — Z98.890 OTHER SPECIFIED POSTPROCEDURAL STATES: Chronic | ICD-10-CM

## 2024-09-06 PROCEDURE — A9585: CPT

## 2024-09-06 PROCEDURE — 70553 MRI BRAIN STEM W/O & W/DYE: CPT | Mod: 26

## 2024-09-06 PROCEDURE — 70553 MRI BRAIN STEM W/O & W/DYE: CPT

## 2024-09-09 ENCOUNTER — APPOINTMENT (OUTPATIENT)
Dept: NEUROLOGY | Facility: CLINIC | Age: 29
End: 2024-09-09
Payer: COMMERCIAL

## 2024-09-09 PROCEDURE — 99215 OFFICE O/P EST HI 40 MIN: CPT

## 2024-09-09 NOTE — HISTORY OF PRESENT ILLNESS
[FreeTextEntry1] : hPylicia Dyer is a 30 yo female whom I am following for a low grade astrocytoma, IDH mutant    Initial MRI scan when she was 14 years old, living in [place] and had fallen off of a horse. An MRI at that time showed an incidental mass and she was seen by a neurosurgeon in [place], who recommended expectant follow up and per history, radiographic changes were noted in 2016.    7/12/2018- Follow up with Dr Gardiner , no referable symptoms. AN MRI was repeated and resection was recommended.  10/29/2018- MRI showed a left parietal mass measuring 5.1 x 3.5 x 3.8 cm. This pre-operative scan was without contrast although by chart review - her previous scans that had been performed with contrast showed that this lesion was non-enhancing.  10/29/2018 - Resection of this mass by Dr Gardiner pathology was a diffuse low grade astrocytoma,  IDH mutant, 1p and 19q NON co-deleted, and MGMT unmethylated. Post-operative MRI (personally reviewed) showed a GTR.  She was then followed expectantly.  By report, post-surgery she has 10 days of right sided weakness which then improved. She took Keppra initially which she felt made her very depressed. There was no history of seizure and Keppra was stopped.  She has almost completed her treatment - Temodar is due to stop on the 8th and RT on the 12th. She denies any headache, nausea, seizure, or focal weakness. Weekly CBCs have held up well.    6/2019 - she had an episode of lightheadedness followed by right LE numbness and then shaking of the right LE.  SHe was started on Lamictal - she tolerated this but reportedly did not refill.  She then had 3 more episodes and saw Dr. Hardin in March 2020. MRI at that time showed increased flair changes anterior to the resection cavity. Lamictal was restarted with a plan to up-titrate.    Seizures continued despite medication. MRI, last performed on 9/12/2021 -over time flair abnormality has progressed anteriorly and also through the posterior corpus callosum. There is no enhancement and no hyperperfusion.  11/30- 1/12/2022 - ChemoRT course  3/7/2022 - MRI stable  3/14-3/18- TMZ first cycle  4/22-4/26 TMZ cycle 2  5/24-5/28 - TMZ 3rd cycle - reduced back to 150 mg / m2  6/28-7/2- TMZ 4th cycle  7/13/2022 - MRI stable  8/15-8/19/2022 - 5th cycle of TMZ (Delayed as [place] out [place] and no baseline blood work.  9/21 - MRI reviewed and is stable - no new enhancement - flair essentially stable and no hyperperfusion.  9/23-9/27- TMZ 6th cycle  11/14/2022, 3/1/2022, 5/1/2023- MRI stable  7/10/2023- MRI stable - patient with bradycardia - Recommended to follow up with Cardiologist. 9/26/2023- MRI stable- 12/5/2023- Neurologically stable. she remains seizure free on Keppra 500 - 1000 and reports good compliance. MRI showed Since 9/12/2023, non-enhancing signal abnormality surrounding the surgical cavity extends more anteriorly into left corona radiata and has more clearly progressed compared to remote examination such as 9/19/2022. Plan was made to repeat imaging in 2 months 2/5/2024- MRI showed Since prior MRI 12/4/2023, previously noted hazy signal abnormality in the left parietal lobe extending anteriorly to the left corona radiata is less prominent which may be due to differences in technique. No new areas of signal abnormality or enhancement. Otherwise, stable exam. 6/3/2024-  MRI stable. She is doing well - has no new complaints - remains seizure free and compliant with her Keppra 500 - 1000. She is here today with an updated MRI - she has no new complaints - remains seizure free and compliant with her medication. I have reviewed her MRI from 9/6 and have compared to multiple priors back to 2/2024 - official review is pending. I see no new enhancement or change in flair abnormality. Official review is pending.

## 2024-09-09 NOTE — HISTORY OF PRESENT ILLNESS
[FreeTextEntry1] : Phylicia Dyer is a 30 yo female whom I am following for a low grade astrocytoma, IDH mutant    Initial MRI scan when she was 14 years old, living in [place] and had fallen off of a horse. An MRI at that time showed an incidental mass and she was seen by a neurosurgeon in [place], who recommended expectant follow up and per history, radiographic changes were noted in 2016.    7/12/2018- Follow up with Dr Gardiner , no referable symptoms. AN MRI was repeated and resection was recommended.  10/29/2018- MRI showed a left parietal mass measuring 5.1 x 3.5 x 3.8 cm. This pre-operative scan was without contrast although by chart review - her previous scans that had been performed with contrast showed that this lesion was non-enhancing.  10/29/2018 - Resection of this mass by Dr Gardiner pathology was a diffuse low grade astrocytoma,  IDH mutant, 1p and 19q NON co-deleted, and MGMT unmethylated. Post-operative MRI (personally reviewed) showed a GTR.  She was then followed expectantly.  By report, post-surgery she has 10 days of right sided weakness which then improved. She took Keppra initially which she felt made her very depressed. There was no history of seizure and Keppra was stopped.  She has almost completed her treatment - Temodar is due to stop on the 8th and RT on the 12th. She denies any headache, nausea, seizure, or focal weakness. Weekly CBCs have held up well.    6/2019 - she had an episode of lightheadedness followed by right LE numbness and then shaking of the right LE.  SHe was started on Lamictal - she tolerated this but reportedly did not refill.  She then had 3 more episodes and saw Dr. Hardin in March 2020. MRI at that time showed increased flair changes anterior to the resection cavity. Lamictal was restarted with a plan to up-titrate.    Seizures continued despite medication. MRI, last performed on 9/12/2021 -over time flair abnormality has progressed anteriorly and also through the posterior corpus callosum. There is no enhancement and no hyperperfusion.  11/30- 1/12/2022 - ChemoRT course  3/7/2022 - MRI stable  3/14-3/18- TMZ first cycle  4/22-4/26 TMZ cycle 2  5/24-5/28 - TMZ 3rd cycle - reduced back to 150 mg / m2  6/28-7/2- TMZ 4th cycle  7/13/2022 - MRI stable  8/15-8/19/2022 - 5th cycle of TMZ (Delayed as patient out of country and no baseline blood work.  9/21 - MRI reviewed and is stable - no new enhancement - flair essentially stable and no hyperperfusion.  9/23-9/27- TMZ 6th cycle  11/14/2022, 3/1/2022, 5/1/2023- MRI stable  7/10/2023- MRI stable - patient with bradycardia - Recommended to follow up with Cardiologist. 9/26/2023- MRI stable- 12/5/2023- Neurologically stable. she remains seizure free on Keppra 500 - 1000 and reports good compliance. MRI showed Since 9/12/2023, non-enhancing signal abnormality surrounding the surgical cavity extends more anteriorly into left corona radiata and has more clearly progressed compared to remote examination such as 9/19/2022. Plan was made to repeat imaging in 2 months 2/5/2024- MRI showed Since prior MRI 12/4/2023, previously noted hazy signal abnormality in the left parietal lobe extending anteriorly to the left corona radiata is less prominent which may be due to differences in technique. No new areas of signal abnormality or enhancement. Otherwise, stable exam. 6/3/2024-  MRI stable. She is doing well - has no new complaints - remains seizure free and compliant with her Keppra 500 - 1000. She is here today with an updated MRI - she has no new complaints - remains seizure free and compliant with her medication. I have reviewed her MRI from 9/6 and have compared to multiple priors back to 2/2024 - official review is pending. I see no new enhancement or change in flair abnormality. Official review is pending.

## 2024-09-09 NOTE — DISCUSSION/SUMMARY
[FreeTextEntry1] : In summary, this is a 28 yo woman with a diffuse low grade unmethylated Astrocytic tumor - stable. Will check official report. If unchanged, then plan for follow up MRI and visit in 1/2025.

## 2024-09-11 ENCOUNTER — APPOINTMENT (OUTPATIENT)
Dept: PSYCHIATRY | Facility: CLINIC | Age: 29
End: 2024-09-11

## 2024-09-11 ENCOUNTER — NON-APPOINTMENT (OUTPATIENT)
Age: 29
End: 2024-09-11

## 2024-09-11 NOTE — DISCUSSION/SUMMARY
[FreeTextEntry1] : PT contacted therapist late the night before scheduled session to cancel due to change in her work schedule. therapist offered time to reschedule and is awaiting reply from PT

## 2024-09-13 ENCOUNTER — APPOINTMENT (OUTPATIENT)
Dept: PSYCHIATRY | Facility: CLINIC | Age: 29
End: 2024-09-13
Payer: COMMERCIAL

## 2024-09-13 DIAGNOSIS — F41.9 ANXIETY DISORDER, UNSPECIFIED: ICD-10-CM

## 2024-09-13 DIAGNOSIS — F43.20 ADJUSTMENT DISORDER, UNSPECIFIED: ICD-10-CM

## 2024-09-13 PROCEDURE — 90834 PSYTX W PT 45 MINUTES: CPT | Mod: 95

## 2024-09-17 ENCOUNTER — NON-APPOINTMENT (OUTPATIENT)
Age: 29
End: 2024-09-17

## 2024-09-17 NOTE — PLAN
[FreeTextEntry2] : 1) develop insight 2) strengthen coping mechanisms  [Integrative Therapy] : Integrative Therapy  [Supportive Therapy] : Supportive Therapy [de-identified] : Patient, Phylicia arrived on time for psychotherapy session. Phylicia worked on reflection of recent time spent with family visiting from out of town and parts of family that live in [facility]. Marlee invited Phylicia to reflect on dynamics in these relationships and work on identifying her own feeling, thoughts, and behaviors. Phylicia was open and responsive to interventions used in session, showing more openness to her internal experiences and ability to explore emotional experience. Phylicia opened up to exploring some past experiences associated with family dynamics and impact on her. Patient ended session in good emotional and behavioral control.  [Recommended Frequency of Visits: ____] : Recommended frequency of visits: [unfilled] [Return in ____ week(s)] : Return in [unfilled] week(s) [FreeTextEntry1] : Attend weekly psychotherapy appointments with Marlee Storm

## 2024-09-17 NOTE — REASON FOR VISIT
[Patient preference] : as per patient preference [Telehealth (audio & video) - Individual/Group] : This visit was provided via telehealth using real-time 2-way audio visual technology. [Other Location: e.g. Home (Enter Location, City,State)___] : The provider was located at [unfilled]. [Home] : The patient, [unfilled], was located at home, [unfilled], at the time of the visit. [Patient's space is appropriate for telehealth and maintains privacy/confidentiality.] : Patient's space is appropriate for telehealth and maintains privacy/confidentiality. [Participant(s) identity verified] : Participant(s) identity verified. [Verbal consent obtained from patient/other participant(s)] : Verbal consent for telehealth/telephonic services obtained from patient/other participant(s) [FreeTextEntry4] : 11:15am [FreeTextEntry5] : 12:00pm [Patient] : Patient

## 2024-09-18 ENCOUNTER — APPOINTMENT (OUTPATIENT)
Dept: PSYCHIATRY | Facility: CLINIC | Age: 29
End: 2024-09-18

## 2024-09-18 NOTE — DISCUSSION/SUMMARY
[FreeTextEntry1] : PT was not present at time of telehealth appointment. Therapist, Marlee Storm reached out to PT but did not reach her. Therapist is currently waiting a response at time of writing this note.

## 2024-09-20 DIAGNOSIS — F43.20 ADJUSTMENT DISORDER, UNSPECIFIED: ICD-10-CM

## 2024-09-25 ENCOUNTER — APPOINTMENT (OUTPATIENT)
Dept: PSYCHIATRY | Facility: CLINIC | Age: 29
End: 2024-09-25

## 2024-09-26 ENCOUNTER — APPOINTMENT (OUTPATIENT)
Dept: PSYCHIATRY | Facility: CLINIC | Age: 29
End: 2024-09-26
Payer: COMMERCIAL

## 2024-09-26 PROCEDURE — 90834 PSYTX W PT 45 MINUTES: CPT | Mod: 95

## 2024-09-27 NOTE — PLAN
[Integrative Therapy] : Integrative Therapy  [Supportive Therapy] : Supportive Therapy [Recommended Frequency of Visits: ____] : Recommended frequency of visits: [unfilled] [Return in ____ week(s)] : Return in [unfilled] week(s) [FreeTextEntry2] : 1) develop insight 2) strengthen coping mechanisms  [de-identified] : Patient, Phylicia arrived on time for psychotherapy session. Phylicia shared some feelings of anxiety associated with therapy and Marlee used an interpersonal psychodynamic approach to engage with exploring this feeling. Marlee asked open ended questions to invite Phylicia to further identify and explore her thoughts and feelings associated with treatment. Phylicia was very open to this and reflective, exploring positive, comforting feelings in addition to some anxiety/nervousness. Marlee worked on validating Phylicia's experiences and they both acknowledged the adjustment of beginning treatment and better understanding the process of treatment. Phylicia expressed some relief and continued motivation for treatment. Patient ended session in good emotional and behavioral control.  [FreeTextEntry1] : Attend weekly psychotherapy appointments with Marlee Storm

## 2024-09-27 NOTE — REASON FOR VISIT
[Patient preference] : as per patient preference [Telehealth (audio & video) - Individual/Group] : This visit was provided via telehealth using real-time 2-way audio visual technology. [Other Location: e.g. Home (Enter Location, City,State)___] : The provider was located at [unfilled]. [Home] : The patient, [unfilled], was located at home, [unfilled], at the time of the visit. [Patient's space is appropriate for telehealth and maintains privacy/confidentiality.] : Patient's space is appropriate for telehealth and maintains privacy/confidentiality. [Participant(s) identity verified] : Participant(s) identity verified. [Verbal consent obtained from patient/other participant(s)] : Verbal consent for telehealth/telephonic services obtained from patient/other participant(s) [Patient] : Patient [FreeTextEntry4] : 11:15am [FreeTextEntry5] : 12:00pm

## 2024-09-27 NOTE — END OF VISIT
[Duration of Psychotherapy Visit (minutes spent in synchronous communication): ____] : Duration of Psychotherapy Visit (minutes spent in synchronous communication): [unfilled] [Individual Psychotherapy for 38-52 minutes] : Individual Psychotherapy for 38 - 52 minutes [Teletherapy Service Provided] : The services provided in this session were delivered via tele-therapy [Licensed Clinician] : Licensed Clinician [FreeTextEntry3] [place] [FreeTextEntry5] [place]

## 2024-10-02 ENCOUNTER — APPOINTMENT (OUTPATIENT)
Dept: PSYCHIATRY | Facility: CLINIC | Age: 29
End: 2024-10-02
Payer: COMMERCIAL

## 2024-10-02 ENCOUNTER — APPOINTMENT (OUTPATIENT)
Dept: PSYCHIATRY | Facility: CLINIC | Age: 29
End: 2024-10-02

## 2024-10-02 DIAGNOSIS — F41.9 ANXIETY DISORDER, UNSPECIFIED: ICD-10-CM

## 2024-10-02 DIAGNOSIS — F43.20 ADJUSTMENT DISORDER, UNSPECIFIED: ICD-10-CM

## 2024-10-02 PROCEDURE — 90834 PSYTX W PT 45 MINUTES: CPT | Mod: 95

## 2024-10-04 NOTE — PLAN
[FreeTextEntry2] : 1) develop insight 2) strengthen coping mechanisms  [Integrative Therapy] : Integrative Therapy  [Supportive Therapy] : Supportive Therapy [de-identified] : Patient, Phylicia arrived on time for psychotherapy session. Phylicia reported experiencing some distress associated with interpersonal family dynamics and reflected on a recent discussion that particularly contributed to sense of distress and anxiousness at time of session. Marlee used an integrative approach, inviting Phylicia to identify and explore her thoughts, feelings, and behaviors pertaining to the dynamics being discussed. Phylicia showed openness throughout session and actively engaged with reflective thinking and processing.  Patient ended session in good emotional and behavioral control.  [Recommended Frequency of Visits: ____] : Recommended frequency of visits: [unfilled] [Return in ____ week(s)] : Return in [unfilled] week(s) [FreeTextEntry1] : Attend weekly psychotherapy appointments with Marlee Storm

## 2024-10-04 NOTE — REASON FOR VISIT
[Patient preference] : as per patient preference [Telehealth (audio & video) - Individual/Group] : This visit was provided via telehealth using real-time 2-way audio visual technology. [Other Location: e.g. Home (Enter Location, City,State)___] : The provider was located at [unfilled]. [Home] : The patient, [unfilled], was located at home, [unfilled], at the time of the visit. [Patient's space is appropriate for telehealth and maintains privacy/confidentiality.] : Patient's space is appropriate for telehealth and maintains privacy/confidentiality. [Participant(s) identity verified] : Participant(s) identity verified. [Verbal consent obtained from patient/other participant(s)] : Verbal consent for telehealth/telephonic services obtained from patient/other participant(s) [FreeTextEntry4] : 11:30am [FreeTextEntry5] : 12:15pm [Patient] : Patient

## 2024-10-09 ENCOUNTER — APPOINTMENT (OUTPATIENT)
Dept: PSYCHIATRY | Facility: CLINIC | Age: 29
End: 2024-10-09

## 2024-10-10 ENCOUNTER — APPOINTMENT (OUTPATIENT)
Dept: PSYCHIATRY | Facility: CLINIC | Age: 29
End: 2024-10-10

## 2024-10-10 ENCOUNTER — APPOINTMENT (OUTPATIENT)
Dept: PSYCHIATRY | Facility: CLINIC | Age: 29
End: 2024-10-10
Payer: COMMERCIAL

## 2024-10-10 PROCEDURE — 90834 PSYTX W PT 45 MINUTES: CPT | Mod: 95

## 2024-10-16 ENCOUNTER — APPOINTMENT (OUTPATIENT)
Dept: PSYCHIATRY | Facility: CLINIC | Age: 29
End: 2024-10-16
Payer: COMMERCIAL

## 2024-10-16 ENCOUNTER — APPOINTMENT (OUTPATIENT)
Dept: PSYCHIATRY | Facility: CLINIC | Age: 29
End: 2024-10-16

## 2024-10-16 DIAGNOSIS — F43.20 ADJUSTMENT DISORDER, UNSPECIFIED: ICD-10-CM

## 2024-10-16 DIAGNOSIS — F41.9 ANXIETY DISORDER, UNSPECIFIED: ICD-10-CM

## 2024-10-16 PROCEDURE — 90834 PSYTX W PT 45 MINUTES: CPT | Mod: 95

## 2024-10-23 ENCOUNTER — APPOINTMENT (OUTPATIENT)
Dept: PSYCHIATRY | Facility: CLINIC | Age: 29
End: 2024-10-23

## 2024-10-23 ENCOUNTER — NON-APPOINTMENT (OUTPATIENT)
Age: 29
End: 2024-10-23

## 2024-10-30 ENCOUNTER — APPOINTMENT (OUTPATIENT)
Dept: PSYCHIATRY | Facility: CLINIC | Age: 29
End: 2024-10-30
Payer: COMMERCIAL

## 2024-10-30 ENCOUNTER — APPOINTMENT (OUTPATIENT)
Dept: PSYCHIATRY | Facility: CLINIC | Age: 29
End: 2024-10-30

## 2024-10-30 PROCEDURE — 90832 PSYTX W PT 30 MINUTES: CPT | Mod: 95

## 2024-11-06 ENCOUNTER — APPOINTMENT (OUTPATIENT)
Dept: PSYCHIATRY | Facility: CLINIC | Age: 29
End: 2024-11-06

## 2024-11-06 ENCOUNTER — APPOINTMENT (OUTPATIENT)
Dept: PSYCHIATRY | Facility: CLINIC | Age: 29
End: 2024-11-06
Payer: COMMERCIAL

## 2024-11-06 PROCEDURE — 90834 PSYTX W PT 45 MINUTES: CPT | Mod: 95

## 2024-11-12 ENCOUNTER — APPOINTMENT (OUTPATIENT)
Dept: PSYCHIATRY | Facility: CLINIC | Age: 29
End: 2024-11-12
Payer: COMMERCIAL

## 2024-11-12 PROCEDURE — 90834 PSYTX W PT 45 MINUTES: CPT | Mod: 95

## 2024-11-19 ENCOUNTER — APPOINTMENT (OUTPATIENT)
Dept: PSYCHIATRY | Facility: CLINIC | Age: 29
End: 2024-11-19

## 2024-11-19 ENCOUNTER — APPOINTMENT (OUTPATIENT)
Dept: PSYCHIATRY | Facility: CLINIC | Age: 29
End: 2024-11-19
Payer: COMMERCIAL

## 2024-11-19 PROCEDURE — 90834 PSYTX W PT 45 MINUTES: CPT

## 2024-11-20 ENCOUNTER — APPOINTMENT (OUTPATIENT)
Dept: PSYCHIATRY | Facility: CLINIC | Age: 29
End: 2024-11-20

## 2024-11-26 ENCOUNTER — APPOINTMENT (OUTPATIENT)
Dept: PSYCHIATRY | Facility: CLINIC | Age: 29
End: 2024-11-26
Payer: COMMERCIAL

## 2024-11-26 ENCOUNTER — APPOINTMENT (OUTPATIENT)
Dept: PSYCHIATRY | Facility: CLINIC | Age: 29
End: 2024-11-26

## 2024-11-26 PROCEDURE — 90834 PSYTX W PT 45 MINUTES: CPT | Mod: 95

## 2024-11-27 ENCOUNTER — APPOINTMENT (OUTPATIENT)
Dept: PSYCHIATRY | Facility: CLINIC | Age: 29
End: 2024-11-27

## 2024-12-03 ENCOUNTER — APPOINTMENT (OUTPATIENT)
Dept: PSYCHIATRY | Facility: CLINIC | Age: 29
End: 2024-12-03

## 2024-12-03 ENCOUNTER — APPOINTMENT (OUTPATIENT)
Dept: PSYCHIATRY | Facility: CLINIC | Age: 29
End: 2024-12-03
Payer: COMMERCIAL

## 2024-12-03 PROCEDURE — 90834 PSYTX W PT 45 MINUTES: CPT

## 2024-12-04 ENCOUNTER — APPOINTMENT (OUTPATIENT)
Dept: PSYCHIATRY | Facility: CLINIC | Age: 29
End: 2024-12-04

## 2024-12-10 ENCOUNTER — APPOINTMENT (OUTPATIENT)
Dept: PSYCHIATRY | Facility: CLINIC | Age: 29
End: 2024-12-10

## 2024-12-10 ENCOUNTER — APPOINTMENT (OUTPATIENT)
Dept: PSYCHIATRY | Facility: CLINIC | Age: 29
End: 2024-12-10
Payer: COMMERCIAL

## 2024-12-10 DIAGNOSIS — F43.20 ADJUSTMENT DISORDER, UNSPECIFIED: ICD-10-CM

## 2024-12-10 DIAGNOSIS — F41.9 ANXIETY DISORDER, UNSPECIFIED: ICD-10-CM

## 2024-12-10 PROCEDURE — 90834 PSYTX W PT 45 MINUTES: CPT

## 2024-12-11 ENCOUNTER — APPOINTMENT (OUTPATIENT)
Dept: PSYCHIATRY | Facility: CLINIC | Age: 29
End: 2024-12-11

## 2024-12-17 ENCOUNTER — APPOINTMENT (OUTPATIENT)
Dept: PSYCHIATRY | Facility: CLINIC | Age: 29
End: 2024-12-17

## 2024-12-17 ENCOUNTER — NON-APPOINTMENT (OUTPATIENT)
Age: 29
End: 2024-12-17

## 2024-12-18 ENCOUNTER — APPOINTMENT (OUTPATIENT)
Dept: PSYCHIATRY | Facility: CLINIC | Age: 29
End: 2024-12-18

## 2024-12-19 NOTE — ED PROVIDER NOTE - CROS ED ENMT ALL NEG
S/p loop diverting colostomy and peg insertion  - doing well  - tolerating TF  - colostomy functioning   - wound center referral placed  - f/u in general surgery clinic PRN    negative...

## 2024-12-24 ENCOUNTER — APPOINTMENT (OUTPATIENT)
Dept: PSYCHIATRY | Facility: CLINIC | Age: 29
End: 2024-12-24

## 2024-12-30 ENCOUNTER — NON-APPOINTMENT (OUTPATIENT)
Age: 29
End: 2024-12-30

## 2024-12-31 ENCOUNTER — APPOINTMENT (OUTPATIENT)
Dept: PSYCHIATRY | Facility: CLINIC | Age: 29
End: 2024-12-31

## 2025-01-06 ENCOUNTER — APPOINTMENT (OUTPATIENT)
Dept: MRI IMAGING | Facility: HOSPITAL | Age: 30
End: 2025-01-06

## 2025-01-06 ENCOUNTER — APPOINTMENT (OUTPATIENT)
Dept: NEUROLOGY | Facility: CLINIC | Age: 30
End: 2025-01-06

## 2025-01-07 ENCOUNTER — APPOINTMENT (OUTPATIENT)
Dept: PSYCHIATRY | Facility: CLINIC | Age: 30
End: 2025-01-07

## 2025-01-08 ENCOUNTER — APPOINTMENT (OUTPATIENT)
Dept: PSYCHIATRY | Facility: CLINIC | Age: 30
End: 2025-01-08

## 2025-01-08 ENCOUNTER — NON-APPOINTMENT (OUTPATIENT)
Age: 30
End: 2025-01-08

## 2025-01-14 ENCOUNTER — NON-APPOINTMENT (OUTPATIENT)
Age: 30
End: 2025-01-14

## 2025-01-14 ENCOUNTER — APPOINTMENT (OUTPATIENT)
Dept: PSYCHIATRY | Facility: CLINIC | Age: 30
End: 2025-01-14

## 2025-01-14 ENCOUNTER — APPOINTMENT (OUTPATIENT)
Dept: PSYCHIATRY | Facility: CLINIC | Age: 30
End: 2025-01-14
Payer: COMMERCIAL

## 2025-01-14 DIAGNOSIS — F43.20 ADJUSTMENT DISORDER, UNSPECIFIED: ICD-10-CM

## 2025-01-14 DIAGNOSIS — F41.9 ANXIETY DISORDER, UNSPECIFIED: ICD-10-CM

## 2025-01-14 PROCEDURE — 90834 PSYTX W PT 45 MINUTES: CPT

## 2025-01-15 ENCOUNTER — APPOINTMENT (OUTPATIENT)
Dept: UROLOGY | Facility: CLINIC | Age: 30
End: 2025-01-15
Payer: COMMERCIAL

## 2025-01-15 ENCOUNTER — APPOINTMENT (OUTPATIENT)
Dept: PSYCHIATRY | Facility: CLINIC | Age: 30
End: 2025-01-15

## 2025-01-15 ENCOUNTER — NON-APPOINTMENT (OUTPATIENT)
Age: 30
End: 2025-01-15

## 2025-01-15 VITALS
TEMPERATURE: 98.2 F | HEIGHT: 67 IN | DIASTOLIC BLOOD PRESSURE: 66 MMHG | WEIGHT: 109 LBS | HEART RATE: 105 BPM | BODY MASS INDEX: 17.11 KG/M2 | SYSTOLIC BLOOD PRESSURE: 102 MMHG

## 2025-01-15 DIAGNOSIS — N39.0 URINARY TRACT INFECTION, SITE NOT SPECIFIED: ICD-10-CM

## 2025-01-15 PROCEDURE — 99204 OFFICE O/P NEW MOD 45 MIN: CPT

## 2025-01-16 ENCOUNTER — APPOINTMENT (OUTPATIENT)
Dept: MRI IMAGING | Facility: HOSPITAL | Age: 30
End: 2025-01-16

## 2025-01-16 ENCOUNTER — OUTPATIENT (OUTPATIENT)
Dept: OUTPATIENT SERVICES | Facility: HOSPITAL | Age: 30
LOS: 1 days | End: 2025-01-16
Payer: COMMERCIAL

## 2025-01-16 DIAGNOSIS — Z98.890 OTHER SPECIFIED POSTPROCEDURAL STATES: Chronic | ICD-10-CM

## 2025-01-16 LAB
APPEARANCE: CLEAR
BACTERIA: NEGATIVE /HPF
BILIRUBIN URINE: NEGATIVE
BLOOD URINE: NEGATIVE
C TRACH RRNA SPEC QL NAA+PROBE: NOT DETECTED
CAST: 0 /LPF
COLOR: YELLOW
EPITHELIAL CELLS: 7 /HPF
GLUCOSE QUALITATIVE U: NEGATIVE MG/DL
KETONES URINE: NEGATIVE MG/DL
LEUKOCYTE ESTERASE URINE: NEGATIVE
MICROSCOPIC-UA: NORMAL
N GONORRHOEA RRNA SPEC QL NAA+PROBE: NOT DETECTED
NITRITE URINE: NEGATIVE
PH URINE: 7
PROTEIN URINE: NEGATIVE MG/DL
RED BLOOD CELLS URINE: 0 /HPF
SOURCE AMPLIFICATION: NORMAL
SOURCE AMPLIFICATION: NORMAL
SPECIFIC GRAVITY URINE: 1.01
T VAGINALIS RRNA SPEC QL NAA+PROBE: NOT DETECTED
UROBILINOGEN URINE: 0.2 MG/DL
WHITE BLOOD CELLS URINE: 0 /HPF

## 2025-01-16 PROCEDURE — 70553 MRI BRAIN STEM W/O & W/DYE: CPT

## 2025-01-16 PROCEDURE — A9585: CPT

## 2025-01-16 PROCEDURE — 70553 MRI BRAIN STEM W/O & W/DYE: CPT | Mod: 26

## 2025-01-17 ENCOUNTER — NON-APPOINTMENT (OUTPATIENT)
Age: 30
End: 2025-01-17

## 2025-01-17 LAB — BACTERIA UR CULT: NORMAL

## 2025-01-21 ENCOUNTER — APPOINTMENT (OUTPATIENT)
Dept: PSYCHIATRY | Facility: CLINIC | Age: 30
End: 2025-01-21

## 2025-01-21 ENCOUNTER — NON-APPOINTMENT (OUTPATIENT)
Age: 30
End: 2025-01-21

## 2025-01-22 ENCOUNTER — APPOINTMENT (OUTPATIENT)
Dept: PSYCHIATRY | Facility: CLINIC | Age: 30
End: 2025-01-22

## 2025-01-23 DIAGNOSIS — Z22.39 CARRIER OF OTHER SPECIFIED BACTERIAL DISEASES: ICD-10-CM

## 2025-01-23 LAB
M GENITALIUM DNA UR QL NAA+PROBE: NEGATIVE
M HOMINIS DNA SPEC QL NAA+PROBE: NEGATIVE
MYCOPLASMA HOMINIS CULTURE: NEGATIVE
UREAPLASMA CULTURE: POSITIVE
UREAPLASMA DNA SPEC QL NAA+PROBE: POSITIVE

## 2025-01-23 RX ORDER — DOXYCYCLINE HYCLATE 100 MG/1
100 CAPSULE ORAL TWICE DAILY
Qty: 28 | Refills: 0 | Status: ACTIVE | COMMUNITY
Start: 2025-01-23 | End: 1900-01-01

## 2025-01-28 ENCOUNTER — APPOINTMENT (OUTPATIENT)
Dept: PSYCHIATRY | Facility: CLINIC | Age: 30
End: 2025-01-28

## 2025-01-28 ENCOUNTER — APPOINTMENT (OUTPATIENT)
Dept: PSYCHIATRY | Facility: CLINIC | Age: 30
End: 2025-01-28
Payer: COMMERCIAL

## 2025-01-28 PROCEDURE — 90834 PSYTX W PT 45 MINUTES: CPT | Mod: 95

## 2025-01-29 ENCOUNTER — APPOINTMENT (OUTPATIENT)
Dept: PSYCHIATRY | Facility: CLINIC | Age: 30
End: 2025-01-29

## 2025-01-29 ENCOUNTER — NON-APPOINTMENT (OUTPATIENT)
Age: 30
End: 2025-01-29

## 2025-02-03 ENCOUNTER — APPOINTMENT (OUTPATIENT)
Dept: NEUROLOGY | Facility: CLINIC | Age: 30
End: 2025-02-03

## 2025-02-04 ENCOUNTER — APPOINTMENT (OUTPATIENT)
Dept: PSYCHIATRY | Facility: CLINIC | Age: 30
End: 2025-02-04
Payer: COMMERCIAL

## 2025-02-04 ENCOUNTER — APPOINTMENT (OUTPATIENT)
Dept: PSYCHIATRY | Facility: CLINIC | Age: 30
End: 2025-02-04

## 2025-02-04 DIAGNOSIS — F43.20 ADJUSTMENT DISORDER, UNSPECIFIED: ICD-10-CM

## 2025-02-04 DIAGNOSIS — F41.9 ANXIETY DISORDER, UNSPECIFIED: ICD-10-CM

## 2025-02-04 PROCEDURE — 90834 PSYTX W PT 45 MINUTES: CPT | Mod: 95

## 2025-02-05 ENCOUNTER — APPOINTMENT (OUTPATIENT)
Dept: PSYCHIATRY | Facility: CLINIC | Age: 30
End: 2025-02-05

## 2025-02-11 ENCOUNTER — APPOINTMENT (OUTPATIENT)
Dept: PSYCHIATRY | Facility: CLINIC | Age: 30
End: 2025-02-11

## 2025-02-11 ENCOUNTER — NON-APPOINTMENT (OUTPATIENT)
Age: 30
End: 2025-02-11

## 2025-02-12 ENCOUNTER — APPOINTMENT (OUTPATIENT)
Dept: PSYCHIATRY | Facility: CLINIC | Age: 30
End: 2025-02-12

## 2025-02-18 ENCOUNTER — NON-APPOINTMENT (OUTPATIENT)
Age: 30
End: 2025-02-18

## 2025-02-18 ENCOUNTER — APPOINTMENT (OUTPATIENT)
Dept: PSYCHIATRY | Facility: CLINIC | Age: 30
End: 2025-02-18
Payer: COMMERCIAL

## 2025-02-18 ENCOUNTER — APPOINTMENT (OUTPATIENT)
Dept: PSYCHIATRY | Facility: CLINIC | Age: 30
End: 2025-02-18

## 2025-02-18 PROCEDURE — 90834 PSYTX W PT 45 MINUTES: CPT | Mod: 95

## 2025-02-19 ENCOUNTER — APPOINTMENT (OUTPATIENT)
Dept: PSYCHIATRY | Facility: CLINIC | Age: 30
End: 2025-02-19

## 2025-02-25 ENCOUNTER — APPOINTMENT (OUTPATIENT)
Dept: PSYCHIATRY | Facility: CLINIC | Age: 30
End: 2025-02-25

## 2025-02-25 ENCOUNTER — APPOINTMENT (OUTPATIENT)
Dept: PSYCHIATRY | Facility: CLINIC | Age: 30
End: 2025-02-25
Payer: COMMERCIAL

## 2025-02-25 PROCEDURE — 90834 PSYTX W PT 45 MINUTES: CPT | Mod: 95

## 2025-02-26 ENCOUNTER — APPOINTMENT (OUTPATIENT)
Dept: PSYCHIATRY | Facility: CLINIC | Age: 30
End: 2025-02-26

## 2025-03-04 ENCOUNTER — APPOINTMENT (OUTPATIENT)
Dept: PSYCHIATRY | Facility: CLINIC | Age: 30
End: 2025-03-04
Payer: COMMERCIAL

## 2025-03-04 DIAGNOSIS — F43.20 ADJUSTMENT DISORDER, UNSPECIFIED: ICD-10-CM

## 2025-03-04 DIAGNOSIS — F41.9 ANXIETY DISORDER, UNSPECIFIED: ICD-10-CM

## 2025-03-04 PROCEDURE — 90834 PSYTX W PT 45 MINUTES: CPT

## 2025-03-11 ENCOUNTER — APPOINTMENT (OUTPATIENT)
Dept: PSYCHIATRY | Facility: CLINIC | Age: 30
End: 2025-03-11

## 2025-03-18 ENCOUNTER — APPOINTMENT (OUTPATIENT)
Dept: PSYCHIATRY | Facility: CLINIC | Age: 30
End: 2025-03-18

## 2025-03-18 ENCOUNTER — NON-APPOINTMENT (OUTPATIENT)
Age: 30
End: 2025-03-18

## 2025-03-25 ENCOUNTER — APPOINTMENT (OUTPATIENT)
Dept: PSYCHIATRY | Facility: CLINIC | Age: 30
End: 2025-03-25
Payer: COMMERCIAL

## 2025-03-25 PROCEDURE — 90832 PSYTX W PT 30 MINUTES: CPT

## 2025-04-01 ENCOUNTER — APPOINTMENT (OUTPATIENT)
Dept: PSYCHIATRY | Facility: CLINIC | Age: 30
End: 2025-04-01
Payer: COMMERCIAL

## 2025-04-01 ENCOUNTER — APPOINTMENT (OUTPATIENT)
Dept: PSYCHIATRY | Facility: CLINIC | Age: 30
End: 2025-04-01

## 2025-04-01 DIAGNOSIS — F41.9 ANXIETY DISORDER, UNSPECIFIED: ICD-10-CM

## 2025-04-01 DIAGNOSIS — F43.20 ADJUSTMENT DISORDER, UNSPECIFIED: ICD-10-CM

## 2025-04-01 PROCEDURE — 90834 PSYTX W PT 45 MINUTES: CPT | Mod: 95

## 2025-04-08 ENCOUNTER — NON-APPOINTMENT (OUTPATIENT)
Age: 30
End: 2025-04-08

## 2025-04-08 ENCOUNTER — APPOINTMENT (OUTPATIENT)
Dept: PSYCHIATRY | Facility: CLINIC | Age: 30
End: 2025-04-08

## 2025-04-10 ENCOUNTER — NON-APPOINTMENT (OUTPATIENT)
Age: 30
End: 2025-04-10

## 2025-04-14 ENCOUNTER — NON-APPOINTMENT (OUTPATIENT)
Age: 30
End: 2025-04-14

## 2025-04-15 ENCOUNTER — APPOINTMENT (OUTPATIENT)
Dept: PSYCHIATRY | Facility: CLINIC | Age: 30
End: 2025-04-15
Payer: COMMERCIAL

## 2025-04-15 ENCOUNTER — APPOINTMENT (OUTPATIENT)
Dept: PSYCHIATRY | Facility: CLINIC | Age: 30
End: 2025-04-15

## 2025-04-15 DIAGNOSIS — F41.9 ANXIETY DISORDER, UNSPECIFIED: ICD-10-CM

## 2025-04-15 DIAGNOSIS — F43.20 ADJUSTMENT DISORDER, UNSPECIFIED: ICD-10-CM

## 2025-04-15 PROCEDURE — 90832 PSYTX W PT 30 MINUTES: CPT | Mod: 95

## 2025-04-21 ENCOUNTER — APPOINTMENT (OUTPATIENT)
Dept: UROLOGY | Facility: CLINIC | Age: 30
End: 2025-04-21
Payer: COMMERCIAL

## 2025-04-21 DIAGNOSIS — Z22.39 CARRIER OF OTHER SPECIFIED BACTERIAL DISEASES: ICD-10-CM

## 2025-04-21 DIAGNOSIS — N39.0 URINARY TRACT INFECTION, SITE NOT SPECIFIED: ICD-10-CM

## 2025-04-21 PROCEDURE — 99213 OFFICE O/P EST LOW 20 MIN: CPT | Mod: 25

## 2025-04-21 PROCEDURE — 76775 US EXAM ABDO BACK WALL LIM: CPT

## 2025-04-22 ENCOUNTER — APPOINTMENT (OUTPATIENT)
Dept: PSYCHIATRY | Facility: CLINIC | Age: 30
End: 2025-04-22

## 2025-04-22 LAB
APPEARANCE: CLEAR
BACTERIA: NEGATIVE /HPF
BILIRUBIN URINE: NEGATIVE
BLOOD URINE: NEGATIVE
C TRACH RRNA SPEC QL NAA+PROBE: NOT DETECTED
CAST: 0 /LPF
COLOR: YELLOW
EPITHELIAL CELLS: 2 /HPF
GLUCOSE QUALITATIVE U: NEGATIVE MG/DL
KETONES URINE: NEGATIVE MG/DL
LEUKOCYTE ESTERASE URINE: ABNORMAL
MICROSCOPIC-UA: NORMAL
N GONORRHOEA RRNA SPEC QL NAA+PROBE: NOT DETECTED
NITRITE URINE: NEGATIVE
PH URINE: 6
PROTEIN URINE: NEGATIVE MG/DL
RED BLOOD CELLS URINE: 2 /HPF
SOURCE AMPLIFICATION: NORMAL
SOURCE AMPLIFICATION: NORMAL
SPECIFIC GRAVITY URINE: 1.01
T VAGINALIS RRNA SPEC QL NAA+PROBE: NOT DETECTED
UROBILINOGEN URINE: 0.2 MG/DL
WHITE BLOOD CELLS URINE: 1 /HPF

## 2025-04-24 LAB — BACTERIA UR CULT: ABNORMAL

## 2025-04-26 LAB
M GENITALIUM DNA UR QL NAA+PROBE: NEGATIVE
M HOMINIS DNA SPEC QL NAA+PROBE: NEGATIVE
UREAPLASMA DNA SPEC QL NAA+PROBE: POSITIVE

## 2025-04-28 LAB
MYCOPLASMA HOMINIS CULTURE: NEGATIVE
UREAPLASMA CULTURE: NEGATIVE

## 2025-04-29 ENCOUNTER — APPOINTMENT (OUTPATIENT)
Dept: PSYCHIATRY | Facility: CLINIC | Age: 30
End: 2025-04-29
Payer: COMMERCIAL

## 2025-04-29 PROCEDURE — 90834 PSYTX W PT 45 MINUTES: CPT

## 2025-05-05 ENCOUNTER — APPOINTMENT (OUTPATIENT)
Dept: NEUROLOGY | Facility: CLINIC | Age: 30
End: 2025-05-05

## 2025-05-05 ENCOUNTER — OUTPATIENT (OUTPATIENT)
Dept: OUTPATIENT SERVICES | Facility: HOSPITAL | Age: 30
LOS: 1 days | End: 2025-05-05
Payer: MEDICAID

## 2025-05-05 ENCOUNTER — APPOINTMENT (OUTPATIENT)
Dept: NEUROLOGY | Facility: CLINIC | Age: 30
End: 2025-05-05
Payer: MEDICAID

## 2025-05-05 ENCOUNTER — APPOINTMENT (OUTPATIENT)
Dept: MRI IMAGING | Facility: HOSPITAL | Age: 30
End: 2025-05-05

## 2025-05-05 VITALS
HEIGHT: 67 IN | WEIGHT: 109 LBS | BODY MASS INDEX: 17.11 KG/M2 | HEART RATE: 78 BPM | SYSTOLIC BLOOD PRESSURE: 119 MMHG | TEMPERATURE: 97.6 F | DIASTOLIC BLOOD PRESSURE: 77 MMHG | OXYGEN SATURATION: 100 %

## 2025-05-05 DIAGNOSIS — Z98.890 OTHER SPECIFIED POSTPROCEDURAL STATES: Chronic | ICD-10-CM

## 2025-05-05 PROCEDURE — 70553 MRI BRAIN STEM W/O & W/DYE: CPT

## 2025-05-05 PROCEDURE — 99215 OFFICE O/P EST HI 40 MIN: CPT

## 2025-05-05 PROCEDURE — A9585: CPT

## 2025-05-05 PROCEDURE — 70553 MRI BRAIN STEM W/O & W/DYE: CPT | Mod: 26

## 2025-05-05 RX ORDER — FAMOTIDINE 20 MG/1
20 TABLET, FILM COATED ORAL
Refills: 0 | Status: ACTIVE | COMMUNITY

## 2025-05-06 ENCOUNTER — APPOINTMENT (OUTPATIENT)
Dept: PSYCHIATRY | Facility: CLINIC | Age: 30
End: 2025-05-06

## 2025-05-06 ENCOUNTER — APPOINTMENT (OUTPATIENT)
Dept: PSYCHIATRY | Facility: CLINIC | Age: 30
End: 2025-05-06
Payer: MEDICAID

## 2025-05-06 DIAGNOSIS — F41.9 ANXIETY DISORDER, UNSPECIFIED: ICD-10-CM

## 2025-05-06 DIAGNOSIS — F43.20 ADJUSTMENT DISORDER, UNSPECIFIED: ICD-10-CM

## 2025-05-06 PROCEDURE — 90834 PSYTX W PT 45 MINUTES: CPT | Mod: 95

## 2025-05-09 ENCOUNTER — NON-APPOINTMENT (OUTPATIENT)
Age: 30
End: 2025-05-09

## 2025-05-13 ENCOUNTER — APPOINTMENT (OUTPATIENT)
Dept: PSYCHIATRY | Facility: CLINIC | Age: 30
End: 2025-05-13

## 2025-05-20 ENCOUNTER — NON-APPOINTMENT (OUTPATIENT)
Age: 30
End: 2025-05-20

## 2025-05-20 ENCOUNTER — APPOINTMENT (OUTPATIENT)
Dept: PSYCHIATRY | Facility: CLINIC | Age: 30
End: 2025-05-20

## 2025-05-21 ENCOUNTER — APPOINTMENT (OUTPATIENT)
Dept: PSYCHIATRY | Facility: CLINIC | Age: 30
End: 2025-05-21
Payer: MEDICAID

## 2025-05-21 ENCOUNTER — APPOINTMENT (OUTPATIENT)
Dept: PSYCHIATRY | Facility: CLINIC | Age: 30
End: 2025-05-21

## 2025-05-21 PROCEDURE — 90832 PSYTX W PT 30 MINUTES: CPT | Mod: 95

## 2025-05-27 ENCOUNTER — APPOINTMENT (OUTPATIENT)
Dept: PSYCHIATRY | Facility: CLINIC | Age: 30
End: 2025-05-27
Payer: MEDICAID

## 2025-05-27 ENCOUNTER — APPOINTMENT (OUTPATIENT)
Dept: PSYCHIATRY | Facility: CLINIC | Age: 30
End: 2025-05-27

## 2025-05-27 PROCEDURE — 90834 PSYTX W PT 45 MINUTES: CPT | Mod: 95

## 2025-06-03 ENCOUNTER — APPOINTMENT (OUTPATIENT)
Dept: PSYCHIATRY | Facility: CLINIC | Age: 30
End: 2025-06-03
Payer: MEDICAID

## 2025-06-03 PROCEDURE — 90834 PSYTX W PT 45 MINUTES: CPT

## 2025-06-10 ENCOUNTER — APPOINTMENT (OUTPATIENT)
Dept: PSYCHIATRY | Facility: CLINIC | Age: 30
End: 2025-06-10
Payer: MEDICAID

## 2025-06-10 PROCEDURE — 90832 PSYTX W PT 30 MINUTES: CPT

## 2025-06-17 ENCOUNTER — APPOINTMENT (OUTPATIENT)
Dept: PSYCHIATRY | Facility: CLINIC | Age: 30
End: 2025-06-17

## 2025-06-18 ENCOUNTER — APPOINTMENT (OUTPATIENT)
Dept: PSYCHIATRY | Facility: CLINIC | Age: 30
End: 2025-06-18

## 2025-06-26 ENCOUNTER — APPOINTMENT (OUTPATIENT)
Dept: PSYCHIATRY | Facility: CLINIC | Age: 30
End: 2025-06-26

## 2025-06-26 ENCOUNTER — NON-APPOINTMENT (OUTPATIENT)
Age: 30
End: 2025-06-26

## 2025-07-01 ENCOUNTER — APPOINTMENT (OUTPATIENT)
Dept: PSYCHIATRY | Facility: CLINIC | Age: 30
End: 2025-07-01
Payer: MEDICAID

## 2025-07-01 PROCEDURE — 90834 PSYTX W PT 45 MINUTES: CPT | Mod: 95

## 2025-07-08 ENCOUNTER — APPOINTMENT (OUTPATIENT)
Dept: PSYCHIATRY | Facility: CLINIC | Age: 30
End: 2025-07-08

## 2025-07-11 ENCOUNTER — APPOINTMENT (OUTPATIENT)
Dept: PSYCHIATRY | Facility: CLINIC | Age: 30
End: 2025-07-11
Payer: MEDICAID

## 2025-07-11 PROCEDURE — 90834 PSYTX W PT 45 MINUTES: CPT | Mod: 95

## 2025-07-16 ENCOUNTER — APPOINTMENT (OUTPATIENT)
Dept: PSYCHIATRY | Facility: CLINIC | Age: 30
End: 2025-07-16
Payer: MEDICAID

## 2025-07-16 PROCEDURE — 90832 PSYTX W PT 30 MINUTES: CPT | Mod: 93

## 2025-07-22 ENCOUNTER — APPOINTMENT (OUTPATIENT)
Dept: PSYCHIATRY | Facility: CLINIC | Age: 30
End: 2025-07-22

## 2025-07-29 ENCOUNTER — APPOINTMENT (OUTPATIENT)
Dept: PSYCHIATRY | Facility: CLINIC | Age: 30
End: 2025-07-29

## 2025-07-30 ENCOUNTER — APPOINTMENT (OUTPATIENT)
Dept: PSYCHIATRY | Facility: CLINIC | Age: 30
End: 2025-07-30
Payer: MEDICAID

## 2025-07-30 DIAGNOSIS — F43.20 ADJUSTMENT DISORDER, UNSPECIFIED: ICD-10-CM

## 2025-07-30 DIAGNOSIS — F41.9 ANXIETY DISORDER, UNSPECIFIED: ICD-10-CM

## 2025-07-30 PROCEDURE — 90832 PSYTX W PT 30 MINUTES: CPT | Mod: 95

## 2025-08-05 ENCOUNTER — NON-APPOINTMENT (OUTPATIENT)
Age: 30
End: 2025-08-05

## 2025-08-05 ENCOUNTER — APPOINTMENT (OUTPATIENT)
Dept: PSYCHIATRY | Facility: CLINIC | Age: 30
End: 2025-08-05

## 2025-08-12 ENCOUNTER — APPOINTMENT (OUTPATIENT)
Dept: PSYCHIATRY | Facility: CLINIC | Age: 30
End: 2025-08-12

## 2025-08-18 ENCOUNTER — APPOINTMENT (OUTPATIENT)
Dept: MRI IMAGING | Facility: CLINIC | Age: 30
End: 2025-08-18

## 2025-08-19 ENCOUNTER — APPOINTMENT (OUTPATIENT)
Dept: PSYCHIATRY | Facility: CLINIC | Age: 30
End: 2025-08-19

## 2025-08-21 ENCOUNTER — APPOINTMENT (OUTPATIENT)
Dept: PSYCHIATRY | Facility: CLINIC | Age: 30
End: 2025-08-21
Payer: MEDICAID

## 2025-08-21 ENCOUNTER — NON-APPOINTMENT (OUTPATIENT)
Age: 30
End: 2025-08-21

## 2025-08-21 DIAGNOSIS — F41.9 ANXIETY DISORDER, UNSPECIFIED: ICD-10-CM

## 2025-08-21 DIAGNOSIS — F43.20 ADJUSTMENT DISORDER, UNSPECIFIED: ICD-10-CM

## 2025-08-21 PROCEDURE — 90832 PSYTX W PT 30 MINUTES: CPT

## 2025-08-26 ENCOUNTER — APPOINTMENT (OUTPATIENT)
Dept: PSYCHIATRY | Facility: CLINIC | Age: 30
End: 2025-08-26

## 2025-09-02 ENCOUNTER — APPOINTMENT (OUTPATIENT)
Dept: PSYCHIATRY | Facility: CLINIC | Age: 30
End: 2025-09-02

## 2025-09-03 ENCOUNTER — APPOINTMENT (OUTPATIENT)
Dept: PSYCHIATRY | Facility: CLINIC | Age: 30
End: 2025-09-03
Payer: MEDICAID

## 2025-09-03 DIAGNOSIS — F43.20 ADJUSTMENT DISORDER, UNSPECIFIED: ICD-10-CM

## 2025-09-03 DIAGNOSIS — F41.9 ANXIETY DISORDER, UNSPECIFIED: ICD-10-CM

## 2025-09-03 PROCEDURE — 90832 PSYTX W PT 30 MINUTES: CPT | Mod: 95

## 2025-09-04 ENCOUNTER — APPOINTMENT (OUTPATIENT)
Dept: PSYCHIATRY | Facility: CLINIC | Age: 30
End: 2025-09-04

## 2025-09-09 ENCOUNTER — APPOINTMENT (OUTPATIENT)
Dept: PSYCHIATRY | Facility: CLINIC | Age: 30
End: 2025-09-09

## 2025-09-16 ENCOUNTER — APPOINTMENT (OUTPATIENT)
Dept: PSYCHIATRY | Facility: CLINIC | Age: 30
End: 2025-09-16

## 2025-09-18 ENCOUNTER — APPOINTMENT (OUTPATIENT)
Dept: PSYCHIATRY | Facility: CLINIC | Age: 30
End: 2025-09-18
Payer: MEDICAID

## 2025-09-18 DIAGNOSIS — F41.9 ANXIETY DISORDER, UNSPECIFIED: ICD-10-CM

## 2025-09-18 DIAGNOSIS — F43.20 ADJUSTMENT DISORDER, UNSPECIFIED: ICD-10-CM

## 2025-09-18 PROCEDURE — 90832 PSYTX W PT 30 MINUTES: CPT | Mod: 95
